# Patient Record
Sex: FEMALE | Race: WHITE | NOT HISPANIC OR LATINO | Employment: UNEMPLOYED | URBAN - METROPOLITAN AREA
[De-identification: names, ages, dates, MRNs, and addresses within clinical notes are randomized per-mention and may not be internally consistent; named-entity substitution may affect disease eponyms.]

---

## 2017-03-09 ENCOUNTER — OFFICE VISIT - RIVER FALLS (OUTPATIENT)
Dept: FAMILY MEDICINE | Facility: CLINIC | Age: 47
End: 2017-03-09

## 2017-03-09 ASSESSMENT — MIFFLIN-ST. JEOR: SCORE: 1412.53

## 2017-05-17 ENCOUNTER — OFFICE VISIT - RIVER FALLS (OUTPATIENT)
Dept: FAMILY MEDICINE | Facility: CLINIC | Age: 47
End: 2017-05-17

## 2017-08-29 ENCOUNTER — OFFICE VISIT - RIVER FALLS (OUTPATIENT)
Dept: FAMILY MEDICINE | Facility: CLINIC | Age: 47
End: 2017-08-29

## 2017-08-29 ASSESSMENT — MIFFLIN-ST. JEOR: SCORE: 1473.31

## 2018-06-29 ENCOUNTER — OFFICE VISIT - RIVER FALLS (OUTPATIENT)
Dept: FAMILY MEDICINE | Facility: CLINIC | Age: 48
End: 2018-06-29

## 2018-06-29 ASSESSMENT — MIFFLIN-ST. JEOR: SCORE: 1468.89

## 2018-10-18 ENCOUNTER — OFFICE VISIT - RIVER FALLS (OUTPATIENT)
Dept: FAMILY MEDICINE | Facility: CLINIC | Age: 48
End: 2018-10-18

## 2019-05-30 ENCOUNTER — OFFICE VISIT - RIVER FALLS (OUTPATIENT)
Dept: FAMILY MEDICINE | Facility: CLINIC | Age: 49
End: 2019-05-30

## 2019-09-13 ENCOUNTER — OFFICE VISIT - RIVER FALLS (OUTPATIENT)
Dept: FAMILY MEDICINE | Facility: CLINIC | Age: 49
End: 2019-09-13

## 2019-10-08 ENCOUNTER — OFFICE VISIT - RIVER FALLS (OUTPATIENT)
Dept: FAMILY MEDICINE | Facility: CLINIC | Age: 49
End: 2019-10-08

## 2020-12-04 ENCOUNTER — COMMUNICATION - RIVER FALLS (OUTPATIENT)
Dept: FAMILY MEDICINE | Facility: CLINIC | Age: 50
End: 2020-12-04

## 2020-12-04 ENCOUNTER — OFFICE VISIT - RIVER FALLS (OUTPATIENT)
Dept: FAMILY MEDICINE | Facility: CLINIC | Age: 50
End: 2020-12-04

## 2020-12-16 ENCOUNTER — OFFICE VISIT - RIVER FALLS (OUTPATIENT)
Dept: FAMILY MEDICINE | Facility: CLINIC | Age: 50
End: 2020-12-16

## 2021-01-07 ENCOUNTER — OFFICE VISIT - RIVER FALLS (OUTPATIENT)
Dept: FAMILY MEDICINE | Facility: CLINIC | Age: 51
End: 2021-01-07

## 2021-01-15 ENCOUNTER — COMMUNICATION - RIVER FALLS (OUTPATIENT)
Dept: FAMILY MEDICINE | Facility: CLINIC | Age: 51
End: 2021-01-15

## 2021-01-16 ENCOUNTER — COMMUNICATION - RIVER FALLS (OUTPATIENT)
Dept: FAMILY MEDICINE | Facility: CLINIC | Age: 51
End: 2021-01-16

## 2021-01-16 ENCOUNTER — OFFICE VISIT - RIVER FALLS (OUTPATIENT)
Dept: FAMILY MEDICINE | Facility: CLINIC | Age: 51
End: 2021-01-16

## 2021-01-16 LAB
BACTERIA #/AREA URNS HPF: NORMAL /[HPF]
EPITHELIAL CELLS UR: NORMAL
MUCOUS THREADS #/AREA URNS LPF: PRESENT /[LPF]
RBC #/AREA URNS AUTO: NORMAL /[HPF]
WBC #/AREA URNS AUTO: NORMAL /[HPF]

## 2021-01-28 ENCOUNTER — COMMUNICATION - RIVER FALLS (OUTPATIENT)
Dept: FAMILY MEDICINE | Facility: CLINIC | Age: 51
End: 2021-01-28

## 2021-01-28 ENCOUNTER — OFFICE VISIT - RIVER FALLS (OUTPATIENT)
Dept: FAMILY MEDICINE | Facility: CLINIC | Age: 51
End: 2021-01-28

## 2021-01-28 LAB
ALBUMIN UR-MCNC: ABNORMAL G/DL
BACTERIA #/AREA URNS HPF: NORMAL /[HPF]
BILIRUB UR QL STRIP: ABNORMAL
EPITHELIAL CELLS UR: NORMAL
GLUCOSE UR STRIP-MCNC: ABNORMAL MG/DL
HGB UR QL STRIP: NEGATIVE
KETONES UR STRIP-MCNC: ABNORMAL MG/DL
LEUKOCYTE ESTERASE UR QL STRIP: ABNORMAL
MUCOUS THREADS #/AREA URNS LPF: PRESENT /[LPF]
NITRATE UR QL: POSITIVE
PH UR STRIP: 5 [PH] (ref 5–8)
RBC #/AREA URNS AUTO: NORMAL /[HPF]
SP GR UR STRIP: ABNORMAL (ref 1–1.03)
WBC #/AREA URNS AUTO: NORMAL /[HPF]

## 2021-01-30 LAB — BACTERIA SPEC CULT: NORMAL

## 2021-02-01 ENCOUNTER — COMMUNICATION - RIVER FALLS (OUTPATIENT)
Dept: FAMILY MEDICINE | Facility: CLINIC | Age: 51
End: 2021-02-01

## 2021-02-09 ENCOUNTER — COMMUNICATION - RIVER FALLS (OUTPATIENT)
Dept: FAMILY MEDICINE | Facility: CLINIC | Age: 51
End: 2021-02-09

## 2021-02-10 ENCOUNTER — OFFICE VISIT - RIVER FALLS (OUTPATIENT)
Dept: FAMILY MEDICINE | Facility: CLINIC | Age: 51
End: 2021-02-10

## 2021-03-15 ENCOUNTER — AMBULATORY - HEALTHEAST (OUTPATIENT)
Dept: SURGERY | Facility: AMBULATORY SURGERY CENTER | Age: 51
End: 2021-03-15

## 2021-03-15 DIAGNOSIS — Z11.59 ENCOUNTER FOR SCREENING FOR OTHER VIRAL DISEASES: ICD-10-CM

## 2021-03-22 ENCOUNTER — OFFICE VISIT - RIVER FALLS (OUTPATIENT)
Dept: FAMILY MEDICINE | Facility: CLINIC | Age: 51
End: 2021-03-22

## 2021-03-26 ASSESSMENT — MIFFLIN-ST. JEOR: SCORE: 1479.77

## 2021-03-29 ENCOUNTER — ANESTHESIA - HEALTHEAST (OUTPATIENT)
Dept: SURGERY | Facility: AMBULATORY SURGERY CENTER | Age: 51
End: 2021-03-29

## 2021-03-30 ENCOUNTER — SURGERY - HEALTHEAST (OUTPATIENT)
Dept: SURGERY | Facility: AMBULATORY SURGERY CENTER | Age: 51
End: 2021-03-30

## 2021-03-30 ASSESSMENT — MIFFLIN-ST. JEOR: SCORE: 1479.77

## 2021-06-05 VITALS — HEIGHT: 65 IN | WEIGHT: 190 LBS | BODY MASS INDEX: 31.65 KG/M2

## 2021-06-16 NOTE — ANESTHESIA CARE TRANSFER NOTE
Last vitals:   Vitals:    03/30/21 1212   BP: 101/58   Pulse: 84   Resp: 16   Temp: 36.3  C (97.3  F)   SpO2: 97%     Patient's level of consciousness is awake  Spontaneous respirations: yes  Maintains airway independently: yes  Dentition unchanged: yes  Oropharynx: oropharynx clear of all foreign objects    QCDR Measures:  ASA# 20 - Surgical Safety Checklist: WHO surgical safety checklist completed prior to induction    PQRS# 430 - Adult PONV Prevention: 4558F - Pt received => 2 anti-emetic agents (different classes) preop & intraop  ASA# 8 - Peds PONV Prevention: NA - Not pediatric patient, not GA or 2 or more risk factors NOT present  PQRS# 424 - Jadyn-op Temp Management: 4559F - At least one body temp DOCUMENTED => 35.5C or 95.9F within required timeframe  PQRS# 426 - PACU Transfer Protocol: - Transfer of care checklist used  ASA# 14 - Acute Post-op Pain: ASA14B - Patient did NOT experience pain >= 7 out of 10

## 2021-06-16 NOTE — ANESTHESIA PREPROCEDURE EVALUATION
Anesthesia Evaluation      Patient summary reviewed   History of anesthetic complications (ponv)     Airway   Mallampati: II   Pulmonary - negative ROS and normal exam                          Cardiovascular - negative ROS and normal exam  Exercise tolerance: > or = 4 METS  Rhythm: regular  Rate: normal,         Neuro/Psych    (+) depression, anxiety/panic attacks,     Endo/Other    (+) obesity,      GI/Hepatic/Renal - negative ROS           Dental - normal exam                        Anesthesia Plan  Planned anesthetic: MAC  Planned anesthetic: MAC  FiO2 less than 30%  Propofol ggt  Decadron/zofran  Ketamine prn  LMA prn  ASA 2     Anesthetic plan and risks discussed with: patient    Post-op plan: routine recovery

## 2021-06-16 NOTE — ANESTHESIA POSTPROCEDURE EVALUATION
Patient: Pinky Post  Procedure(s):  CYSTOSCOPY WITH BLADDER HYDRODYSTENSION, , PUDENDAL NERVE BLOCK  Anesthesia type: MAC    Patient location: Phase II Recovery  Last vitals:   Vitals Value Taken Time   /64 03/30/21 1315   Temp 36.3  C (97.3  F) 03/30/21 1212   Pulse 79 03/30/21 1324   Resp 16 03/30/21 1315   SpO2 97 % 03/30/21 1324   Vitals shown include unvalidated device data.  Post vital signs: stable  Level of consciousness: awake and responds to simple questions  Post-anesthesia pain: pain controlled  Post-anesthesia nausea and vomiting: no  Pulmonary: unassisted, return to baseline  Cardiovascular: stable and blood pressure at baseline  Hydration: adequate  Anesthetic events: no    QCDR Measures:  ASA# 11 - Jadyn-op Cardiac Arrest: ASA11B - Patient did NOT experience unanticipated cardiac arrest  ASA# 12 - Jadyn-op Mortality Rate: ASA12B - Patient did NOT die  ASA# 13 - PACU Re-Intubation Rate: ASA13B - Patient did NOT require a new airway mgmt  ASA# 10 - Composite Anes Safety: ASA10A - No serious adverse event    Additional Notes:

## 2022-02-11 VITALS
WEIGHT: 199.6 LBS | HEART RATE: 83 BPM | OXYGEN SATURATION: 99 % | SYSTOLIC BLOOD PRESSURE: 116 MMHG | DIASTOLIC BLOOD PRESSURE: 78 MMHG | TEMPERATURE: 96.3 F

## 2022-02-12 VITALS
DIASTOLIC BLOOD PRESSURE: 62 MMHG | DIASTOLIC BLOOD PRESSURE: 74 MMHG | HEIGHT: 64 IN | HEART RATE: 88 BPM | HEART RATE: 76 BPM | BODY MASS INDEX: 30.76 KG/M2 | SYSTOLIC BLOOD PRESSURE: 108 MMHG | SYSTOLIC BLOOD PRESSURE: 116 MMHG | WEIGHT: 177.8 LBS | TEMPERATURE: 99 F | BODY MASS INDEX: 30.35 KG/M2 | HEIGHT: 64 IN

## 2022-02-12 VITALS
HEIGHT: 65 IN | DIASTOLIC BLOOD PRESSURE: 64 MMHG | SYSTOLIC BLOOD PRESSURE: 110 MMHG | HEART RATE: 64 BPM | WEIGHT: 187.6 LBS | TEMPERATURE: 98.3 F | BODY MASS INDEX: 31.25 KG/M2

## 2022-02-12 VITALS
HEART RATE: 80 BPM | TEMPERATURE: 97.4 F | TEMPERATURE: 98.3 F | SYSTOLIC BLOOD PRESSURE: 118 MMHG | SYSTOLIC BLOOD PRESSURE: 124 MMHG | DIASTOLIC BLOOD PRESSURE: 82 MMHG | HEART RATE: 95 BPM | OXYGEN SATURATION: 97 % | DIASTOLIC BLOOD PRESSURE: 70 MMHG

## 2022-02-12 VITALS
SYSTOLIC BLOOD PRESSURE: 106 MMHG | HEIGHT: 64 IN | HEART RATE: 108 BPM | DIASTOLIC BLOOD PRESSURE: 78 MMHG | BODY MASS INDEX: 32.64 KG/M2 | WEIGHT: 191.2 LBS | OXYGEN SATURATION: 98 %

## 2022-02-12 VITALS — DIASTOLIC BLOOD PRESSURE: 60 MMHG | SYSTOLIC BLOOD PRESSURE: 104 MMHG | HEART RATE: 76 BPM | TEMPERATURE: 98.2 F

## 2022-02-12 VITALS — SYSTOLIC BLOOD PRESSURE: 102 MMHG | HEART RATE: 88 BPM | DIASTOLIC BLOOD PRESSURE: 56 MMHG

## 2022-02-15 NOTE — PROGRESS NOTES
Patient:   LUCIANA PEACE            MRN: [id number]            FIN: [id number]               Age:   47 years     Sex:  Female     :  1970   Associated Diagnoses:   Fibromyalgia; CFS (chronic fatigue syndrome); Anxiety   Author:   Nancy Otero MD      Chief Complaint   3/9/2017 4:13 PM CST     Disability paperwork; med refills      Interval History   Patient here to follow up on fibromyalgia and anxiety.   Overall medicaitons are helping. Due for refills of clonazepam.   Had increased stress due to death of boyfriend. Feels like she is started to recover. Back to usual medication.  Patient continues to have daily pain. Has been unable to work since . Needs paperwork completed.      Health Status   Allergies:    Allergic Reactions (All)  Severity Not Documented  Gabapentin (No reactions were documented)  Lexapro (Recurrent itchy hives during initial therapy with lexapro)  Nortriptyline (Had migraine at 50 mg dose)   Medications:  (Selected)   Prescriptions  Prescribed  BuSpar 10 mg oral tablet: 1 tab(s) ( 10 mg ), PO, TID, # 270 tab(s), 3 Refill(s), Type: Maintenance, Pharmacy: [facility] #[id number], patient will notify pharmacy when prescription is needed, 1 tab(s) po tid  clonazePAM 1 mg oral tablet: 1 tab(s) ( 1 mg ), po, bid, # 60 tab(s), 5 Refill(s), Type: Maintenance, Pharmacy: [facility] #[id number], Pt needs appt for further refills, 1 tab(s) po bid  cyclobenzaprine 10 mg oral tablet: 1 tab(s) ( 10 mg ), po, tid, # 90 tab(s), 2 Refill(s), Type: Soft Stop, Pharmacy: [facility] #[id number], 1 tab(s) po tid  meloxicam 15 mg oral tablet: 1 tab(s) ( 15 mg ), po, daily, x 90 day(s), # 90 tab(s), 3 Refill(s), Type: Physician Stop, Pharmacy: [facility] #[id number], patient will notify pharmacy when prescription is needed, 1 tab(s) po daily,x90 day(s)  omeprazole 20 mg oral delayed release capsule: 1 cap(s) ( 20 mg ), po, daily, # 90 cap(s), 3 Refill(s), Type: Maintenance, Pharmacy: [facility] #[id number],  patient will notify pharmacy when prescription is needed, 1 cap(s) po daily  traMADol 50 mg oral tablet: 1 tab(s) ( 50 mg ), PO, bid, Instructions: up to bid; okay to fill today; fill every 30 days, PRN: for pain, # 60 tab(s), 5 Refill(s), Type: Maintenance, Pharmacy: [facility] #[id number], 1 tab(s) po bid,PRN:for pain,Instr:up to bid; okay to fill today...  venlafaxine 225 mg oral tablet, extended release: 1 tab(s) ( 225 mg ), po, daily, # 30 tab(s), 11 Refill(s), Type: Maintenance, Pharmacy: [facility] #[id number], 1 tab(s) po daily  Documented Medications  Documented  Calcium-Vitamin D: Calcium-Vitamin D, Supply, 0 Refill(s), Type: Maintenance   Problem list:    All Problems  Anxiety / SNOMED CT 58087157 / Confirmed  CFS (chronic fatigue syndrome) / SNOMED CT 25138204 / Confirmed  Depression, recurrent / SNOMED CT 684865453 / Confirmed  Eating disorder in remission / SNOMED CT [id number] / Confirmed  Fibromyalgia / SNOMED CT 48793442 / Confirmed  Migraine / SNOMED CT 48611832 / Confirmed  Obesity / SNOMED CT 0671142945 / Probable  PALPITATIONS / ICD-9-.1 / Confirmed  Stress incontinence / SNOMED CT 139840659 / Confirmed      Histories   Family History:    Kidney disease  Daughter     Procedure history:    Laparoscopic cholecystectomy (37484178) on 5/26/2016 at 46 Years.  LEEP.  Tonsillectomy.  Breast reduction (665271198).  Abdominoplasty (986446386).  Vaginal delivery ([id number]).  Exploratory lap and right dermoid excision 2007.      Physical Examination   Vital Signs   3/9/2017 4:13 PM CST Peripheral Pulse Rate 76 bpm    Pulse Site Radial artery    HR Method Manual    Systolic Blood Pressure 108 mmHg    Diastolic Blood Pressure 62 mmHg    Mean Arterial Pressure 77 mmHg    BP Site Left arm    BP Method Manual      Measurements from flowsheet : Measurements   3/9/2017 4:13 PM CST Height Measured - Standard 63.75 in    Weight Measured - Standard 177.8 lb     BSA 1.9 m2    Body Mass Index 30.76 kg/m2      General:  Alert and oriented, No acute distress.    Psychiatric:  Cooperative, Appropriate mood & affect, Normal judgment.       Impression and Plan   Diagnosis     Fibromyalgia (MFW52-ZG M79.7).     CFS (chronic fatigue syndrome) (YKD90-GG R53.82).     Anxiety (TAW89-ZI F41.1).     Plan:  paperwork completed. Follow up again in 6 months..    Orders     Orders (Selected)   Prescriptions  Prescribed  clonazePAM 1 mg oral tablet: 1 tab(s) ( 1 mg ), po, bid, # 60 tab(s), 5 Refill(s), Type: Maintenance, Pharmacy: [facility] #[id number], Pt needs appt for further refills, 1 tab(s) po bid  traMADol 50 mg oral tablet: 1 tab(s) ( 50 mg ), PO, bid, Instructions: up to bid; okay to fill today; fill every 30 days, PRN: for pain, # 60 tab(s), 5 Refill(s), Type: Maintenance, Pharmacy: [facility] #[id number], 1 tab(s) po bid,PRN:for pain,Instr:up to bid; okay to fill today....

## 2022-02-15 NOTE — PROGRESS NOTES
Patient:   LUCIANA PEACE            MRN: [id number]            FIN: [id number]               Age:   49 years     Sex:  Female     :  1970   Associated Diagnoses:   Right acute suppurative otitis media; Persistent cough   Author:   Nawaf Key PA-C      Report Summary   Diagnosis  Right acute suppurative otitis media (FSZ80-BL H66.001).  Patient InstructionsOrders   Visit Information      Date of Service: 2019 09:33 am  Performing Location: [facility]  Encounter#: [id number]      Primary Care Provider (PCP):  Nancy Otero MD    NPI# [id number]      Referring Provider:  Nawaf Key PA-C    NPI# [id number]   Visit type:  New symptom.    Source of history:  Self, Medical record.    History limitation:  None.       Chief Complaint   2019 9:34 AM CDT    c/o cough, ear pain, weak, chest hurts x 3 days        History of Present Illness             The patient presents with cough.  The cough is described as hacking.  The severity of the cough is moderate.  The cough is episodic, fluctuates in intensity and is worsening.  The cough has lasted for 3 day(s).  Associated symptoms consist of nasal congestion, sore throat, otalgia and denies fever.        Review of Systems   Eye:  Negative.    Ear/Nose/Mouth/Throat:  Negative except as documented in history of present illness.    Respiratory:  Negative except as documented in history of present illness.       Health Status   Allergies:    Allergic Reactions (All)  Severity Not Documented  Gabapentin (No reactions were documented)  Lexapro (Recurrent itchy hives during initial therapy with lexapro)  Nortriptyline (Had migraine at 50 mg dose)   Medications:  (Selected)   Prescriptions  Prescribed  Tessalon Perles 100 mg oral capsule: = 2 cap(s) ( 200 mg ), PO, TID, # 42 cap(s), 0 Refill(s), Type: Maintenance, Pharmacy: [facility] #[id number], 2 cap(s) Oral tid,x7 day(s)  cefdinir 300 mg oral capsule: = 1 cap(s) ( 300 mg ), PO, q12hr, x  10 day(s), # 20 cap(s), 0 Refill(s), Type: Acute, Pharmacy: [facility] #[id number], 1 cap(s) Oral q12 hrs,x10 day(s)  clonazePAM 1 mg oral tablet: 1 tab(s) ( 1 mg ), po, bid, # 60 tab(s), 5 Refill(s), Type: Maintenance, Pharmacy: [facility] #[id number], 1 tab(s) Oral bid  tiZANidine 2 mg oral tablet: 1 tab(s) ( 2 mg ), Oral, q8 hrs, # 90 tab(s), 0 Refill(s), Type: Maintenance, Pharmacy: [facility] #[id number], 1 tab(s) Oral q8 hrs  venlafaxine 150 mg oral tablet, extended release: 2 tab(s) ( 300 mg ), po, daily, Instructions: either capsules or tablets - whichever is most cost effective for patient, # 180 tab(s), 3 Refill(s), Type: Maintenance, Pharmacy: [facility] #[id number], 2 tab(s) Oral daily,Instr:either capsules or tablet...  Documented Medications  Documented  Zantac: 0 Refill(s), Type: Maintenance,    Medications          *denotes recorded medication          Tessalon Perles 100 mg oral capsule: 200 mg, 2 cap(s), PO, TID, for 7 day(s), 42 cap(s), 0 Refill(s).          cefdinir 300 mg oral capsule: 300 mg, 1 cap(s), PO, q12hr, for 10 day(s), 20 cap(s), 0 Refill(s).          clonazePAM 1 mg oral tablet: 1 mg, 1 tab(s), po, bid, 60 tab(s), 5 Refill(s).          *Zantac: 0 Refill(s).          tiZANidine 2 mg oral tablet: 2 mg, 1 tab(s), Oral, q8 hrs, 90 tab(s), 0 Refill(s).          venlafaxine 150 mg oral tablet, extended release: 300 mg, 2 tab(s), po, daily, either capsules or tablets - whichever is most cost effective for patient, 180 tab(s), 3 Refill(s).       Problem list:    All Problems  Stress incontinence / SNOMED CT 405099714 / Confirmed  PALPITATIONS / ICD-9-.1 / Confirmed  Obesity / SNOMED CT 5032793275 / Probable  Migraine / SNOMED CT 46175827 / Confirmed  Fibromyalgia / SNOMED CT 87997551 / Confirmed  Eating disorder in remission / SNOMED CT [id number] / Confirmed  Depression, recurrent / SNOMED CT 998704547 / Confirmed  CFS (chronic fatigue syndrome) / SNOMED CT  33013726 / Confirmed  Anxiety / SNOMED CT 04434611 / Confirmed  Resolved: Abnormal Pap Smear of Cervix      Histories   Past Medical History:    Active  Fibromyalgia (75258560)  CFS (chronic fatigue syndrome) (94598787)  PALPITATIONS (785.1)  Comments:  4/14/2014 CDT 11:37 AM CDT - Pratibha Pastrana MD  Normal EKG and Holter  Anxiety (53573744)  Depression, recurrent (277078897)  Stress incontinence (645198310)  Migraine (36544850)  Eating disorder in remission ([id number])  Resolved  Abnormal Pap Smear of Cervix:  Resolved.   Family History:    Kidney disease  Daughter     Procedure history:    Laparoscopic cholecystectomy (16146421) on 5/26/2016 at 46 Years.  LEEP.  Tonsillectomy.  Breast reduction (865982734).  Abdominoplasty (200816112).  Vaginal delivery ([id number]).  Exploratory lap and right dermoid excision 2007.   Social History:        Alcohol Assessment            1-2 times per month, 1 drinks/episode average.      Tobacco Assessment            Former smoker, Cigarettes, 20 per day.                     Comments:                      05/17/2017 - Divine Mitchell LPN                     Quit for 6 yrs. Restarted.      Substance Abuse Assessment            Never      Home and Environment Assessment            Risks in environment: Does not wear helmet.      Nutrition and Health Assessment            Type of diet: Regular.      Exercise and Physical Activity Assessment            Exercise frequency: Never.      Sexual Assessment            Sexually active: Yes.  Sexual orientation: Heterosexual.        Physical Examination   Vital Signs   9/13/2019 9:34 AM CDT Temperature Tympanic 97.4 DegF  LOW    Peripheral Pulse Rate 95 bpm    HR Method Electronic    Systolic Blood Pressure 118 mmHg    Diastolic Blood Pressure 82 mmHg  HI    Mean Arterial Pressure 94 mmHg    BP Site Right arm    BP Method Manual    Oxygen Saturation 97 %      Measurements from flowsheet  : Measurements   9/13/2019 9:34 AM CDT    Ht/Wt Measurement Refused by Patient?     Yes     General:  Alert and oriented, No acute distress.    Eye:  Pupils are equal, round and reactive to light, Extraocular movements are intact, Normal conjunctiva.    HENT:  Normocephalic, Oral mucosa is moist, No pharyngeal erythema.         Ear: Right ear, Tympanic membrane ( Intact, Erythematous, Fluid in middle ear ).         Nose: Both nostrils, Discharge ( Small amount, Green ).         Sinus: No tenderness.    Neck:  Supple, Non-tender, No lymphadenopathy.    Respiratory:  Lungs are clear to auscultation, Respirations are non-labored, Breath sounds are equal.    Cardiovascular:  Normal rate, Regular rhythm, No murmur.    Psychiatric:  Cooperative, Appropriate mood & affect.       Impression and Plan   Diagnosis     Right acute suppurative otitis media (UGS44-DZ H66.001).     Persistent cough (TTW80-CQ R05).     Patient Instructions:       Counseled: Patient, Regarding diagnosis, Regarding treatment, Regarding medications, Regarding activity, Regarding smoking cessation, Verbalized understanding.    Orders     Orders (Selected)   Prescriptions  Prescribed  Tessalon Perles 100 mg oral capsule: = 2 cap(s) ( 200 mg ), PO, TID, # 42 cap(s), 0 Refill(s), Type: Maintenance, Pharmacy: [facility] #[id number], 2 cap(s) Oral tid,x7 day(s)  cefdinir 300 mg oral capsule: = 1 cap(s) ( 300 mg ), PO, q12hr, x 10 day(s), # 20 cap(s), 0 Refill(s), Type: Acute, Pharmacy: [facility] #[id number], 1 cap(s) Oral q12 hrs,x10 day(s).     Take medicine as prescribed, side effects discussed.  Tylenol/ibuprofen for fever and discomfort.  Push fluids.  RTC if not improving in 36-48 hours, prior if concerns as we have discussed.

## 2022-02-15 NOTE — NURSING NOTE
Comprehensive Intake Entered On:  2021 11:23 AM CST    Performed On:  2021 11:13 AM CST by Esthela Post               Summary   Chief Complaint :   UTI symptoms not resolving from 21. Just finished Sulfameth/Trimethoprim yesterday. Urgency, burning, coming and going.   Last Menstrual Period :   1/3/2021 CST   Menstrual Status :   Menarcheal   Weight Measured :   199.6 lb(Converted to: 199 lb 10 oz, 90.537 kg)    Systolic Blood Pressure :   116 mmHg   Diastolic Blood Pressure :   78 mmHg   Mean Arterial Pressure :   91 mmHg   Peripheral Pulse Rate :   83 bpm   BP Site :   Right arm   BP Method :   Manual   HR Method :   Electronic   Temperature Tympanic :   96.3 DegF(Converted to: 35.7 DegC)  (LOW)    Oxygen Saturation :   99 %   Sincere Esthela - 2021 11:13 AM CST   Health Status   Allergies Verified? :   Yes   Medication History Verified? :   Yes   Immunizations Current :   Yes   Medical History Verified? :   Yes   Pre-Visit Planning Status :   Not completed   Tobacco Use? :   Current every day smoker   Tobacco Cessation Review :   Ready to quit   Esthela Post - 2021 11:13 AM CST   Demographics   Last Name :   SINCERE   Address :   [address] [place] [address]   First Name :   LUCIANA Roberts Initial :   L   Responsible Party Date of Birth () :   1970 CST   City :   [place]   State :   [place]   Zip Code :   [address]   Contact Relationship to Patient (other) :   Patient   Esthela Post - 2021 11:13 AM CST   Consents   Consent for Immunization Exchange :   Consent Granted   Consent for Immunizations to Providers :   Consent Granted   Esthela Post - 2021 11:13 AM CST   Meds / Allergies   (As Of: 2021 11:23:47 AM CST)   Allergies (Active)   gabapentin  Estimated Onset Date:   Unspecified ; Created By:   Eden Paniagua CMA; Reaction Status:   Active ; Category:   Drug ; Substance:   gabapentin ; Type:   Allergy ; Updated By:   Eden Paniagua CMA; Reviewed Date:   2021 11:19 AM  CST      Lexapro  Estimated Onset Date:   Unspecified ; Reactions:   recurrent itchy hives during initial therapy with Lexapro ; Created By:   Marisela Lizama MA; Reaction Status:   Active ; Category:   Drug ; Substance:   Lexapro ; Type:   Allergy ; Updated By:   Marisela Lizama MA; Reviewed Date:   1/16/2021 11:19 AM CST      nortriptyline  Estimated Onset Date:   Unspecified ; Reactions:   had migraine at 50 mg dose ; Created By:   Marisela Lizama MA; Reaction Status:   Active ; Category:   Drug ; Substance:   nortriptyline ; Type:   Allergy ; Updated By:   Marisela Lizama MA; Reviewed Date:   1/16/2021 11:19 AM CST        Medication List   (As Of: 1/16/2021 11:23:47 AM CST)   Prescription/Discharge Order    clonazePAM  :   clonazePAM ; Status:   Prescribed ; Ordered As Mnemonic:   clonazePAM 1 mg oral tablet ; Simple Display Line:   1 mg, 1 tab(s), po, bid, 60 tab(s), 5 Refill(s) ; Ordering Provider:   Nancy Otero MD; Catalog Code:   clonazePAM ; Order Dt/Tm:   6/29/2018 11:21:11 AM CDT          venlafaxine  :   venlafaxine ; Status:   Prescribed ; Ordered As Mnemonic:   venlafaxine 150 mg oral tablet, extended release ; Simple Display Line:   300 mg, 2 tab(s), po, daily, either capsules or tablets - whichever is most cost effective for patient, 180 tab(s), 3 Refill(s) ; Ordering Provider:   Nancy Otero MD; Catalog Code:   venlafaxine ; Order Dt/Tm:   6/29/2018 11:21:24 AM CDT            Home Meds    hydrOXYzine  :   hydrOXYzine ; Status:   Documented ; Ordered As Mnemonic:   hydrOXYzine ; Simple Display Line:   0 Refill(s) ; Catalog Code:   hydrOXYzine ; Order Dt/Tm:   10/8/2019 12:02:28 PM CDT            ID Risk Screen   Recent Travel History :   No recent travel   Family Member Travel History :   No recent travel   Other Exposure to Infectious Disease :   Unknown   Esthela Post - 1/16/2021 11:13 AM CST   Social History   Social History   (As Of: 1/16/2021 11:23:47 AM CST)   Alcohol:         1-2 times per month, 1 drinks/episode average.   (Last Updated: 5/17/2017 3:52:48 PM CDT by Divine Mitchell LPN)          Tobacco:        10 or more cigarettes (1/2 pack or more)/day in last 30 days   (Last Updated: 1/16/2021 11:20:47 AM CST by Esthela Post)          Electronic Cigarette/Vaping:        Electronic Cigarette Use: Never.   (Last Updated: 1/16/2021 11:21:00 AM CST by Esthela Post)          Substance Abuse:        Never   (Last Updated: 4/23/2014 11:28:42 AM CDT by Chelo Saenz MA)          Home/Environment:        Risks in environment: Does not wear helmet.   (Last Updated: 3/26/2015 12:59:08 PM CDT by Tracey Mendez MA)          Nutrition/Health:        Type of diet: Regular.   (Last Updated: 3/26/2015 12:59:14 PM CDT by Tracey Mendez MA)          Exercise:        Exercise frequency: Never.   (Last Updated: 3/26/2015 12:59:25 PM CDT by Tracey Mendez MA)          Sexual:        Sexually active: Yes.  Sexual orientation: Heterosexual.   (Last Updated: 4/23/2014 11:29:24 AM CDT by Chelo Saenz MA)

## 2022-02-15 NOTE — PROGRESS NOTES
Patient:   LUCIANA PEACE            MRN: [id number]            FIN: [id number]               Age:   50 years     Sex:  Female     :  1970   Associated Diagnoses:   Recurrent cystitis   Author:   Nancy Otero MD      Visit Information      Date of Service: 02/10/2021 08:17 am  Performing Location: [facility]  Encounter#: [id number]      Primary Care Provider (PCP):  Nancy Otero MD    NPI# [id number]      Referring Provider:  Nancy Otero MD    NPEDI# [id number]   Participants in room during visit:  patient, self   Location of patient:  home  Location of provider:  office  Video Start Time:  858  Video End Time:   913  Video visit via Pikimal    Today's visit was conducted via video conference due to the COVID-19 pandemic.  The patient's consent to proceed with a video visit has been obtained and documented.      Chief Complaint   2/10/2021 8:14 AM CST    f/u cystitis ER visit, needs referral to urology. Verbal consent given for video visit.      History of Present Illness   Patient is being evaluated via a billable video visit.  patient with minimal improvement on cipro, finished medication  azo is helpful  still having good and bad days  culture was negative  no urinary changes noted  ready to move on to next steps as previously discussed.      Health Status   Allergies:    Allergic Reactions (Selected)  Severity Not Documented  Gabapentin (No reactions were documented)  Lexapro (Recurrent itchy hives during initial therapy with lexapro)  Nortriptyline (Had migraine at 50 mg dose)   Medications:  (Selected)   Prescriptions  Prescribed  Pyridium 200 mg oral tablet: = 1 tab(s) ( 200 mg ), Oral, tid, # 21 tab(s), 2 Refill(s), Type: Maintenance, Pharmacy: [facility] #[id number], 1 tab(s) Oral tid, 199.6, lb, 21 11:13:00 CST, Weight Measured  amitriptyline 10 mg oral tablet: = 1 tab(s) ( 10 mg ), Oral, hs, Instructions: increase to 2 tabs on 21, # 60 tab(s), 1  Refill(s), Type: Maintenance, Pharmacy: [facility] #[id number], 1 tab(s) Oral hs,Instr:increase to 2 tabs on 2/13/21, 199.6, lb, 01/16/21 11:13:00 CST, We...  clonazePAM 1 mg oral tablet: 1 tab(s) ( 1 mg ), po, bid, # 60 tab(s), 5 Refill(s), Type: Maintenance, Pharmacy: [facility] #[id number], 1 tab(s) Oral bid  naproxen 500 mg oral delayed release tablet: = 1 tab(s) ( 500 mg ), Oral, bid, Instructions: with food, # 60 tab(s), 0 Refill(s), Type: Maintenance, Pharmacy: [facility] #[id number], 1 tab(s) Oral bid,Instr:with food, 199.6, lb, 01/16/21 11:13:00 CST, Weight Measured  venlafaxine 150 mg oral tablet, extended release: 2 tab(s) ( 300 mg ), po, daily, Instructions: either capsules or tablets - whichever is most cost effective for patient, # 180 tab(s), 3 Refill(s), Type: Maintenance, Pharmacy: [facility] #[id number], 2 tab(s) Oral daily,Instr:either capsules or tablet...  Documented Medications  Documented  hydrOXYzine: 0 Refill(s), Type: Maintenance   Problem list:    All Problems  Tobacco user / SNOMED CT 618126755 / Probable  Stress incontinence / SNOMED CT 462056521 / Confirmed  PALPITATIONS / ICD-9-.1 / Confirmed  Normal EKG and Holter  Obesity / SNOMED CT 7479602314 / Probable  Migraine / SNOMED CT 59121959 / Confirmed  Fibromyalgia / SNOMED CT 35822743 / Confirmed  Eating disorder in remission / SNOMED CT 5Y2951AD-Q008-0N9M-10E2-631017G15F44 / Confirmed  Depression, recurrent / SNOMED CT 827805609 / Confirmed  CFS (chronic fatigue syndrome) / SNOMED CT 77856575 / Confirmed  Anxiety / SNOMED CT 26551481 / Confirmed      Histories   Past Medical History:    Active  Fibromyalgia (SNOMED CT 54398333)  CFS (chronic fatigue syndrome) (SNOMED CT 49166493)  PALPITATIONS (ICD-9-.1)  Comments:  4/14/2014 CDT 11:37 AM CDT - Pratibha Pastrana MD  Normal EKG and Holter  Anxiety (SNOMED CT 34913227)  Depression, recurrent (SNOMED CT 149285137)  Stress incontinence (SNOMED CT 254822661)  Migraine  (SNOMED CT 16300297)  Eating disorder in remission (SNOMED CT 4F2838SE-A859-0C3H-35A0-150995I61V71)  Resolved  Abnormal Pap Smear of Cervix:  Resolved.   Family History:    Kidney disease  Daughter     Procedure history:    Laparoscopic cholecystectomy (15685416) on 5/26/2016 at 46 Years.  LEEP.  Tonsillectomy.  Breast reduction (774356404).  Abdominoplasty (199655984).  Vaginal delivery ([id number]).  Exploratory lap and right dermoid excision 2007.   Social History:        Electronic Cigarette/Vaping Assessment            Electronic Cigarette Use: Never.      Alcohol Assessment            1-2 times per month, 1 drinks/episode average.      Tobacco Assessment            10 or more cigarettes (1/2 pack or more)/day in last 30 days      Substance Abuse Assessment            Never      Home and Environment Assessment            Risks in environment: Does not wear helmet.      Nutrition and Health Assessment            Type of diet: Regular.      Exercise and Physical Activity Assessment            Exercise frequency: Never.      Sexual Assessment            Sexually active: Yes.  Sexual orientation: Heterosexual.        Physical Examination   General:  Alert and oriented, No acute distress.    Eye:  Pupils are equal, round and reactive to light, Normal conjunctiva.    HENT:  Oral mucosa is moist.    Respiratory:  Respirations are non-labored.    Psychiatric:  Cooperative, Appropriate mood & affect, Normal judgment.       Impression and Plan   Diagnosis     Recurrent cystitis (FRN62-KV N30.90).     Plan:  chronic cystitis, unclear etiology, will need urology follow up. OK for ongoing AZO. Will add NSAID and bedtime amitriptyline. Noted that she had headaches on higher doses of nortriptyline. Doubt urge incontinence medication would help. Will get her in to urogynecology..    Orders     Orders (Selected)   Outpatient Orders  Ordered  Referral (Request): 02/10/21 9:02:00 CST, Referred to:  Urogynecology, Referred to: [facility] if possible, Priority: Routine, Recurrent cystitis  Prescriptions  Prescribed  amitriptyline 10 mg oral tablet: = 1 tab(s) ( 10 mg ), Oral, hs, Instructions: increase to 2 tabs on 2/13/21, # 60 tab(s), 1 Refill(s), Type: Maintenance, Pharmacy: [facility] #[id number], 1 tab(s) Oral hs,Instr:increase to 2 tabs on 2/13/21, 199.6, lb, 01/16/21 11:13:00 CST, We...  naproxen 500 mg oral delayed release tablet: = 1 tab(s) ( 500 mg ), Oral, bid, Instructions: with food, # 60 tab(s), 0 Refill(s), Type: Maintenance, Pharmacy: [facility] #[id number], 1 tab(s) Oral bid,Instr:with food, 199.6, lb, 01/16/21 11:13:00 CST, Weight Measured.        Review / Management   Results review:  Lab results   1/28/2021 9:32 AM CST Urine Culture See comment   1/28/2021 9:30 AM CST UA Epithelial Cells Few    UA Mucous Present    UA WBC 3-5    UA RBC 0-2    UA Bacteria Moderate   1/28/2021 9:17 AM CST UA pH 5.0    UA Specific Gravity < OR = 1.005    UA Glucose 1+    UA Bilirubin 1+    UA Ketones TRACE    U Occult Blood NEGATIVE    UA Protein 2+    UA Nitrite POSITIVE    UA Leuk Est 3+   1/16/2021 11:35 AM CST Urine Culture See comment   1/16/2021 11:33 AM CST UA pH 6.0    UA Specific Gravity > OR = 1.030    UA Glucose NEGATIVE    UA Bilirubin NEGATIVE    UA Ketones NEGATIVE    U Occult Blood TRACE    UA Protein NEGATIVE    UA Nitrite NEGATIVE    UA Leuk Est NEGATIVE   1/16/2021 11:28 AM CST UA Epithelial Cells Few    UA Mucous Present    UA WBC 3-5    UA RBC 0-2    UA Bacteria Moderate   .

## 2022-02-15 NOTE — TELEPHONE ENCOUNTER
---------------------  From: Jane Ramsey MA (Phone Messages Pool ([id number]_[place]))   To: [facility] Message Pool ([id number]_[facility]);     Sent: 1/28/2021 8:13:21 AM CST  Subject: UA specimen     Phone Message    PCP:   NANDO      Time of Call:  0807       Person Calling:  pt  Phone number:  [phone]    Reason for call:  pt had video visit w/ [facility] this AM, is to drop off urine sample.  Pt stated that she took some Azo and is worried that it might alter UA results.  Wondering if she can come in tomorrow to leave UA.  Please advise  Returned call at: _    Note:   _    Last office visit and reason:  1/28/2021 w/ [facility] for UTI sx    Transferred to: _---------------------  From: Therese Langley CMA ([facility] Message Pool ([id number]_[facility]))   To: Nancy Otero MD;     Sent: 1/28/2021 8:19:20 AM CST  Subject: FW: UA specimenI would recommend she comes today. The UA dipstick will be affected, but the urine micro and the urine culture would not be affected.---------------------  From: Nancy Otero MD   To: [employer] Message Pool ([id number]_[facility]);     Sent: 1/28/2021 8:53:03 AM CST  Subject: RE: UA specimenPt notified by  of [facility] advice

## 2022-02-15 NOTE — LETTER
(Inserted Image. Unable to display)   September 29, 2021  LUCIANA PEACE  [facility] [place] [address]          Dear LUCIANA,      Thank you for selecting [facility] for your healthcare needs.    Our records indicate you are due for the following services:     Annual Physical    (FYI   Regarding office visits: In some instances, a video visit or telephone visit may be offered as an option.)        To schedule an appointment or if you have further questions, please contact your clinic at [phone].      Powered by [employer]    Sincerely,    Nancy Otero M.D.

## 2022-02-15 NOTE — TELEPHONE ENCOUNTER
---------------------  From: Nancy Otero MD   To: Referral Coordinators Pool ([id number]_[facility]);     Sent: 2/10/2021 9:03:39 AM CST  Subject: recurrent cystitis     ** Submitted: **  Order:Referral (Request)  Details:  2/10/2021 9:02 AM CST, Referred to: Urogynecology, Referred to: [facility] if possible, Priority: Routine, Recurrent cystitis         Signed by Nancy Otero MD  2/10/2021 3:02:00 PM [facility]

## 2022-02-15 NOTE — PROGRESS NOTES
Patient:   LUCIANA PEACE            MRN: [id number]            FIN: [id number]               Age:   49 years     Sex:  Female     :  1970   Associated Diagnoses:   Right acute suppurative otitis media; ETD (eustachian tube dysfunction)   Author:   Nawaf Key PA-C      Report Summary   Diagnosis  Right acute suppurative otitis media (XUD54-PX H66.001).  Patient InstructionsOrders   Visit Information      Date of Service: 2019 09:57 am  Performing Location: [facility]  Encounter#: [id number]   Visit type:  General concerns.    Accompanied by:  No one.    Source of history:  Self, Medical record.    Referral source:  Self.    History limitation:  None.       Chief Complaint   2019 10:01 AM CDT   Right earache and sore throat x few weeks        History of Present Illness             The patient presents with earache.  The location of the earache is the right ear.  The earache is characterized by pain and sensation of fullness.  The severity of the earache is moderate.  The earache is episodic, fluctuates in intensity and is worsening.  The earache has lasted for 1 week(s).  Mild nasal congestion. Sore throat. No fever. CC above noted and confirmed with the patient..        Review of Systems   Constitutional:  Negative.    Eye:  Negative.    Ear/Nose/Mouth/Throat:  Negative except as documented in history of present illness.    Respiratory:  Negative.       Health Status   Allergies:    Allergic Reactions (All)  Severity Not Documented  Gabapentin (No reactions were documented)  Lexapro (Recurrent itchy hives during initial therapy with lexapro)  Nortriptyline (Had migraine at 50 mg dose)   Medications:  (Selected)   Prescriptions  Prescribed  amoxicillin 875 mg oral tablet: = 1 tab(s) ( 875 mg ), PO, BID, # 20 tab(s), 0 Refill(s), Type: Maintenance, Pharmacy: [id number], 1 tab(s) Oral bid,x10 day(s)  clonazePAM 1 mg oral tablet: 1 tab(s) ( 1 mg ), po, bid, # 60 tab(s),  5 Refill(s), Type: Maintenance, Pharmacy: [facility] #[id number], 1 tab(s) Oral bid  omeprazole 20 mg oral delayed release capsule: 1 cap(s) ( 20 mg ), po, daily, # 90 cap(s), 3 Refill(s), Type: Maintenance, Pharmacy: [facility] #[id number], patient will notify pharmacy when prescription is needed, 1 cap(s) po daily  predniSONE 20 mg oral tablet: = 1 tab(s) ( 20 mg ), PO, Daily, x 7 day(s), # 7 tab(s), 0 Refill(s), Type: Acute, Pharmacy: [id number], 1 tab(s) Oral daily,x7 day(s)  tiZANidine 2 mg oral tablet: 1 tab(s) ( 2 mg ), Oral, q8 hrs, # 90 tab(s), 0 Refill(s), Type: Maintenance, Pharmacy: [facility] #[id number], 1 tab(s) Oral q8 hrs  venlafaxine 150 mg oral tablet, extended release: 2 tab(s) ( 300 mg ), po, daily, Instructions: either capsules or tablets - whichever is most cost effective for patient, # 180 tab(s), 3 Refill(s), Type: Maintenance, Pharmacy: [facility] #[id number], 2 tab(s) Oral daily,Instr:either capsules or tablet...  Suspended  meloxicam 15 mg oral tablet: 1 tab(s) ( 15 mg ), po, daily, x 90 day(s), # 90 tab(s), 3 Refill(s), Type: Physician Stop, Pharmacy: [facility] #[id number], patient will notify pharmacy when prescription is needed, 1 tab(s) Oral daily,x90 day(s)  Documented Medications  Documented  Calcium-Vitamin D: Calcium-Vitamin D, Supply, 0 Refill(s), Type: Maintenance   Problem list:    All Problems (Selected)  Stress incontinence / SNOMED CT 890231171 / Confirmed  PALPITATIONS / ICD-9-.1 / Confirmed  Obesity / SNOMED CT 1745357625 / Probable  Migraine / SNOMED CT 36058944 / Confirmed  Fibromyalgia / SNOMED CT 15966252 / Confirmed  Eating disorder in remission / SNOMED CT 3U6960ZK-R984-3O1F-59K3-319634Y11Z53 / Confirmed  Depression, recurrent / SNOMED CT 702705153 / Confirmed  CFS (chronic fatigue syndrome) / SNOMED CT 43121888 / Confirmed  Anxiety / SNOMED CT 91738297 / Confirmed      Histories   Past Medical History:    Active  Fibromyalgia (53928369)  CFS  (chronic fatigue syndrome) (45867493)  PALPITATIONS (785.1)  Comments:  4/14/2014 CDT 11:37 AM CDT - Pratibha Pastrana MD  Normal EKG and Holter  Anxiety (46295753)  Depression, recurrent (171804973)  Stress incontinence (767397632)  Migraine (37814770)  Eating disorder in remission (9B0104AA-[id number])  Resolved  Abnormal Pap Smear of Cervix:  Resolved.   Family History:    Kidney disease  Daughter     Procedure history:    Laparoscopic cholecystectomy (46576954) on 5/26/2016 at 46 Years.  LEEP.  Tonsillectomy.  Breast reduction (395485517).  Abdominoplasty (864222535).  Vaginal delivery ([id number]).  Exploratory lap and right dermoid excision 2007.   Social History:        Alcohol Assessment            1-2 times per month, 1 drinks/episode average.      Tobacco Assessment            Former smoker, Cigarettes, 20 per day.                     Comments:                      05/17/2017 - Divine Mitchell LPN                     Quit for 6 yrs. Restarted.      Substance Abuse Assessment            Never      Home and Environment Assessment            Risks in environment: Does not wear helmet.      Nutrition and Health Assessment            Type of diet: Regular.      Exercise and Physical Activity Assessment            Exercise frequency: Never.      Sexual Assessment            Sexually active: Yes.  Sexual orientation: Heterosexual.      Physical Examination   Vital Signs   5/30/2019 10:01 AM CDT Temperature Tympanic 98.2 DegF    Peripheral Pulse Rate 76 bpm    Pulse Site Radial artery    HR Method Manual    Systolic Blood Pressure 104 mmHg    Diastolic Blood Pressure 60 mmHg    Mean Arterial Pressure 75 mmHg    BP Site Left arm    BP Method Manual      Measurements from flowsheet : Measurements   5/30/2019 10:01 AM CDT   Ht/Wt Measurement Refused by Patient?     Yes     General:  Alert and oriented, No acute distress.    Eye:  Pupils are equal, round and reactive to  light, Extraocular movements are intact, Normal conjunctiva.    HENT:  Normocephalic, Oral mucosa is moist, No pharyngeal erythema.         Ear: Right ear, Tympanic membrane ( Intact, Fluid in middle ear ).         Nose: Both nostrils, Discharge ( Small amount, Clear ).    Neck:  Supple, Non-tender, No lymphadenopathy.    Respiratory:  Lungs are clear to auscultation, Respirations are non-labored.    Cardiovascular:  Normal rate, Regular rhythm.       Impression and Plan   Diagnosis     Right acute suppurative otitis media (CWK84-MW H66.001).     ETD (eustachian tube dysfunction) (LHP66-EN H69.80).     Patient Instructions:       Counseled: Patient, Regarding diagnosis, Regarding treatment, Regarding medications, Activity, Verbalized understanding.    Orders     Orders (Selected)   Prescriptions  Prescribed  amoxicillin 875 mg oral tablet: = 1 tab(s) ( 875 mg ), PO, BID, # 20 tab(s), 0 Refill(s), Type: Maintenance, Pharmacy: [id number], 1 tab(s) Oral bid,x10 day(s)  predniSONE 20 mg oral tablet: = 1 tab(s) ( 20 mg ), PO, Daily, x 7 day(s), # 7 tab(s), 0 Refill(s), Type: Acute, Pharmacy: [id number], 1 tab(s) Oral daily,x7 day(s).     Take medicine as prescribed, side effects discussed.  Tylenol/ibuprofen for fever and discomfort.  Push fluids.  RTC if not improving in 36-48 hours, prior if concerns as we have discussed.

## 2022-02-15 NOTE — PROGRESS NOTES
Patient:   LUCIANA PEACE            MRN: [id number]            FIN: [id number]               Age:   48 years     Sex:  Female     :  1970   Associated Diagnoses:   Depression, recurrent; CFS (chronic fatigue syndrome); Fibromyalgia; Anxiety   Author:   Nancy Otero MD      Chief Complaint   2018 10:59 AM CDT   Depression med check      Interval History   patient here for depression follow up   reviewed PHQ9, results are reviewed, score is elevated but patient reports that she overall feels like she is doing well, starting counseling  due for refills  pain ongoing, no changes, using tramadol intermittently      Health Status   Allergies:    Allergic Reactions (All)  Severity Not Documented  Gabapentin (No reactions were documented)  Lexapro (Recurrent itchy hives during initial therapy with lexapro)  Nortriptyline (Had migraine at 50 mg dose)   Medications:  (Selected)   Prescriptions  Prescribed  clonazePAM 1 mg oral tablet: 1 tab(s) ( 1 mg ), po, bid, # 60 tab(s), 1 Refill(s), Type: Maintenance, Pharmacy: [facility] #[id number], 1 tab(s) po bid  cyclobenzaprine 10 mg oral tablet: 1 tab(s) ( 10 mg ), po, tid, # 90 tab(s), 11 Refill(s), Type: Soft Stop, Pharmacy: [facility] #2512, 1 tab(s) po tid  meloxicam 15 mg oral tablet: 1 tab(s) ( 15 mg ), po, daily, x 90 day(s), # 90 tab(s), 3 Refill(s), Type: Physician Stop, Pharmacy: [facility] #2512, patient will notify pharmacy when prescription is needed, 1 tab(s) po daily,x90 day(s)  omeprazole 20 mg oral delayed release capsule: 1 cap(s) ( 20 mg ), po, daily, # 90 cap(s), 3 Refill(s), Type: Maintenance, Pharmacy: [facility] #2512, patient will notify pharmacy when prescription is needed, 1 cap(s) po daily  traMADol 50 mg oral tablet: 1 tab(s) ( 50 mg ), PO, bid, Instructions: up to bid; okay to fill today; fill every 30 days, PRN: for pain, # 60 tab(s), 5 Refill(s), Type: Maintenance, Pharmacy: [facility] #[id number], 1 tab(s) po bid,PRN:for  pain,Instr:up to bid; okay to fill today...  venlafaxine 150 mg oral tablet, extended release: 2 tab(s) ( 300 mg ), po, daily, Instructions: either capsules or tablets - whichever is most cost effective for patient, # 180 tab(s), 3 Refill(s), Type: Maintenance, Pharmacy: [facility] #[id number], 2 tab(s) po daily,Instr:either capsules or tablets...  Documented Medications  Documented  Calcium-Vitamin D: Calcium-Vitamin D, Supply, 0 Refill(s), Type: Maintenance   Problem list:    All Problems  CFS (chronic fatigue syndrome) / SNOMED CT 34683991 / Confirmed  Eating disorder in remission / SNOMED CT 8Y6791KW-C497-1B0T-18C6-146399M32U95 / Confirmed  Fibromyalgia / SNOMED CT 92203938 / Confirmed  Anxiety / SNOMED CT 89080017 / Confirmed  Stress incontinence / SNOMED CT 400219863 / Confirmed  Migraine / SNOMED CT 13307722 / Confirmed  Obesity / SNOMED CT 8218135938 / Probable  PALPITATIONS / ICD-9-.1 / Confirmed  Depression, recurrent / SNOMED CT 407004875 / Confirmed      Histories   Past Medical History:    Active  Fibromyalgia (SNOMED CT 55056462)  CFS (chronic fatigue syndrome) (SNOMED CT 49522857)  PALPITATIONS (ICD-9-.1)  Comments:  4/14/2014 CDT 11:37 AM CDT - Pratibha Pastrana MD  Normal EKG and Holter  Anxiety (SNOMED CT 74726160)  Depression, recurrent (SNOMED CT 559536671)  Stress incontinence (SNOMED CT 252151286)  Migraine (SNOMED CT 07987454)  Eating disorder in remission (SNOMED CT 3B3170SS-I211-6O6Q-45O7-606427E61L25)  Resolved  Abnormal Pap Smear of Cervix:  Resolved.   Family History:    Kidney disease  Daughter     Procedure history:    Laparoscopic cholecystectomy (67153547) on 5/26/2016 at 46 Years.  LEEP.  Tonsillectomy.  Breast reduction (167233766).  Abdominoplasty (780513238).  Vaginal delivery ([id number]).  Exploratory lap and right dermoid excision 2007.   Social History:        Alcohol Assessment            1-2 times per month, 1 drinks/episode average.       Tobacco Assessment            Former smoker, Cigarettes, 20 per day.                     Comments:                      05/17/2017 - Paula MORANDivine                     Quit for 6 yrs. Restarted.      Substance Abuse Assessment            Never      Home and Environment Assessment            Risks in environment: Does not wear helmet.      Nutrition and Health Assessment            Type of diet: Regular.      Exercise and Physical Activity Assessment            Exercise frequency: Never.      Sexual Assessment            Sexually active: Yes.  Sexual orientation: Heterosexual.        Physical Examination   Vital Signs   6/29/2018 10:59 AM CDT Temperature Tympanic 98.3 DegF    Peripheral Pulse Rate 64 bpm    Pulse Site Radial artery    HR Method Manual    Systolic Blood Pressure 110 mmHg    Diastolic Blood Pressure 64 mmHg    Mean Arterial Pressure 79 mmHg    BP Site Left arm    BP Method Manual      Measurements from flowsheet : Measurements   6/29/2018 10:59 AM CDT Height Measured - Standard 64.5 in    Weight Measured - Standard 187.6 lb    BSA 1.97 m2    Body Mass Index 31.7 kg/m2  HI      General:  Alert and oriented, No acute distress.       Impression and Plan   Diagnosis     Depression, recurrent (YCZ01-ZG F33.1).     CFS (chronic fatigue syndrome) (KDT51-AK R53.82).     Fibromyalgia (BEU09-RW M79.7).     Anxiety (RCP65-FL F41.1).     Orders     Orders (Selected)   Prescriptions  Prescribed  clonazePAM 1 mg oral tablet: 1 tab(s) ( 1 mg ), po, bid, # 60 tab(s), 5 Refill(s), Type: Maintenance, Pharmacy: [facility] #[id number], 1 tab(s) Oral bid  cyclobenzaprine 10 mg oral tablet: 1 tab(s) ( 10 mg ), po, tid, # 90 tab(s), 11 Refill(s), Type: Soft Stop, Pharmacy: [facility] #[id number], 1 tab(s) Oral tid  meloxicam 15 mg oral tablet: 1 tab(s) ( 15 mg ), po, daily, x 90 day(s), # 90 tab(s), 3 Refill(s), Type: Physician Stop, Pharmacy: [facility] #[id number], patient will notify pharmacy when prescription is  needed, 1 tab(s) Oral daily,x90 day(s)  traMADol 50 mg oral tablet: 1 tab(s) ( 50 mg ), PO, bid, PRN: for pain, # 60 tab(s), 5 Refill(s), Type: Maintenance, Pharmacy: [facility] #2512, 1 tab(s) Oral bid,PRN:for pain  venlafaxine 150 mg oral tablet, extended release: 2 tab(s) ( 300 mg ), po, daily, Instructions: either capsules or tablets - whichever is most cost effective for patient, # 180 tab(s), 3 Refill(s), Type: Maintenance, Pharmacy: [facility] #2512, 2 tab(s) Oral daily,Instr:either capsules or tablet....

## 2022-02-15 NOTE — PROGRESS NOTES
Chief Complaint    UTI symptoms not resolving from 1/7/21. Just finished Sulfameth/Trimethoprim yesterday. Urgency, burning, coming and going.  Review of Systems      1 day hx of of dysuria, frequency, urgancy.  No fevers, chills, nausea, vomiting.  Last seen 8 days ago for same, treated view virtual visit with bactrim.  Sx returned yesterday, but seem better at this time.  No vaginal itching, irritation or change of discharge. no concern for STI.  Has a hx of ? kidney stone about 6 weeks ago seen in the ED.  had hematuria but never passed stone that she is aware of, pain did improve.  No imaging at that time.  Physical Exam   Vitals & Measurements    T: 96.3  F (Tympanic)  HR: 83 (Peripheral)  BP: 116/78  SpO2: 99%     WT: 199.6 lb       UA with 3-5 WBC,      0-2 RBC      moderate bacteria       culture pending          Assessment/Plan       Dysuria (R30.0)         Macrobid as ordered.  push fluids.  Await culture. FU prn         Ordered:          Culture, Urine, Routine* ([facility]), Specimen Type: Urine (Clean Catch), Collection Date: 01/16/21 11:06:00 CST          POC URINALYSIS, UA* ([facility]), Specimen Type: Urine, Collection Date: 01/16/21 11:06:00 CST                Orders:         nitrofurantoin, = 1 cap(s) ( 100 mg ), Oral, bid, x 7 day(s), # 14 cap(s), 0 Refill(s), Type: Acute, Pharmacy: [facility] #[id number], 1 cap(s) Oral bid,x7 day(s), 199.6, lb, 01/16/21 11:13:00 CST, Weight Measured, (Ordered)  Patient Information     Name:LUCIANA PEACE      Address:      [address] [place] [id number]     Sex:Female     YOB: 1970     [phone]     Emergency Contact:YINA HARRISON     MRN:[id number]     FIN:[id number]     Location:[facility]     Date of Service:01/16/2021      Primary Care Physician:       Nancy Otero MD, [phone]      Attending Physician:       Yanet IBARRA, Hilary CALDERA, [phone]  Problem List/Past Medical History    Ongoing      Anxiety     CFS (chronic fatigue syndrome)     Depression, recurrent     Eating disorder in remission     Fibromyalgia     Migraine     Obesity     PALPITATIONS       Comments: Normal EKG and Holter     Stress incontinence     Tobacco user    Historical     Abnormal Pap Smear of Cervix  Procedure/Surgical History     Laparoscopic cholecystectomy (05/26/2016)           Abdominoplasty           Breast reduction           Exploratory lap and right dermoid excision 2007           LEEP           Tonsillectomy           Vaginal delivery        Medications    clonazePAM 1 mg oral tablet, 1 mg= 1 tab(s), Oral, bid, 5 refills    hydrOXYzine    Macrobid 100 mg oral capsule, 100 mg= 1 cap(s), Oral, bid    venlafaxine 150 mg oral tablet, extended release, 300 mg= 2 tab(s), Oral, daily, 3 refills  Allergies    Lexapro (recurrent itchy hives during initial therapy with Lexapro)    gabapentin    nortriptyline (had migraine at 50 mg dose)  Social History    Smoking Status     Current every day smoker     Alcohol      1-2 times per month, 1 drinks/episode average.     Electronic Cigarette/Vaping      Electronic Cigarette Use: Never.     Exercise      Exercise frequency: Never.     Home/Environment      Risks in environment: Does not wear helmet.     Nutrition/Health      Type of diet: Regular.     Sexual      Sexually active: Yes. Sexual orientation: Heterosexual.     Substance Abuse      Never     Tobacco      10 or more cigarettes (1/2 pack or more)/day in last 30 days  Family History    Kidney disease: Daughter.    Sister: History is negative    Brother: History is unknown    Sister: History is negative  Immunizations      Vaccine Date Status          tetanus/diphth/pertuss (Tdap) adult/adol 03/25/2015 Given  Lab Results       Lab Results (Last 4 results within 90 days)        UA Epithelial Cells: Few [None  - Few] (01/16/21 11:28:00)       UA Mucous: Present (01/16/21 11:28:00)       UA WBC: 3-5 [None  - 5] (01/16/21 11:28:00)        UA RBC: 0-2 [None  - 2] (01/16/21 11:28:00)       UA Bacteria: Moderate [None  - Few] (01/16/21 11:28:00)

## 2022-02-15 NOTE — PROGRESS NOTES
Patient:   LUCIANA PEACE            MRN: [id number]            FIN: [id number]               Age:   47 years     Sex:  Female     :  1970   Associated Diagnoses:   Fibromyalgia; CFS (chronic fatigue syndrome); Anxiety; Depression, recurrent   Author:   Nancy Otero MD      Chief Complaint   2017 2:16 PM CDT    Med check-Anxiety.      History of Present Illness   Patient here to follow up on anxiety. Notes ongoing symptoms. Still feels like every day is a struggle. Looking into complimentary treatments like [facility] therapy. Still seeing a counselor.  Medications reviewed. Needs refills on cyclobenzaprine which did seem to help.  Discussed effexor. Would like to try higher dose. Will check with pharmacy about price and get back to me.      Health Status   Allergies:    Allergic Reactions (All)  Severity Not Documented  Gabapentin (No reactions were documented)  Lexapro (Recurrent itchy hives during initial therapy with lexapro)  Nortriptyline (Had migraine at 50 mg dose)   Medications:  (Selected)   Prescriptions  Prescribed  BuSpar 10 mg oral tablet: 1 tab(s) ( 10 mg ), PO, TID, # 270 tab(s), 3 Refill(s), Type: Maintenance, Pharmacy: [facility] #[id number], patient will notify pharmacy when prescription is needed, 1 tab(s) po tid  clonazePAM 1 mg oral tablet: 1 tab(s) ( 1 mg ), po, bid, # 60 tab(s), 5 Refill(s), Type: Maintenance, Pharmacy: [facility] #[id number], Pt needs appt for further refills, 1 tab(s) po bid  cyclobenzaprine 10 mg oral tablet: 1 tab(s) ( 10 mg ), po, tid, # 90 tab(s), 2 Refill(s), Type: Soft Stop, Pharmacy: [facility] #[id number], 1 tab(s) po tid  meloxicam 15 mg oral tablet: 1 tab(s) ( 15 mg ), po, daily, x 90 day(s), # 90 tab(s), 3 Refill(s), Type: Physician Stop, Pharmacy: [facility] #[id number], patient will notify pharmacy when prescription is needed, 1 tab(s) po daily,x90 day(s)  omeprazole 20 mg oral delayed release capsule: 1 cap(s) ( 20 mg ), po, daily, # 90 cap(s), 3  Refill(s), Type: Maintenance, Pharmacy: [facility] #[id number], patient will notify pharmacy when prescription is needed, 1 cap(s) po daily  traMADol 50 mg oral tablet: 1 tab(s) ( 50 mg ), PO, bid, Instructions: up to bid; okay to fill today; fill every 30 days, PRN: for pain, # 60 tab(s), 5 Refill(s), Type: Maintenance, Pharmacy: [facility] #[id number], 1 tab(s) po bid,PRN:for pain,Instr:up to bid; okay to fill today...  venlafaxine 225 mg oral tablet, extended release: 1 tab(s) ( 225 mg ), po, daily, # 30 tab(s), 11 Refill(s), Type: Maintenance, Pharmacy: [facility] #[id number], 1 tab(s) po daily  Documented Medications  Documented  Calcium-Vitamin D: Calcium-Vitamin D, Supply, 0 Refill(s), Type: Maintenance   Problem list:    All Problems  Anxiety / SNOMED CT 66091001 / Confirmed  CFS (chronic fatigue syndrome) / SNOMED CT 39187277 / Confirmed  Depression, recurrent / SNOMED CT 184891320 / Confirmed  Eating disorder in remission / SNOMED CT [id number] / Confirmed  Fibromyalgia / SNOMED CT 33179604 / Confirmed  Migraine / SNOMED CT 34826920 / Confirmed  Obesity / SNOMED CT 6285860028 / Probable  PALPITATIONS / ICD-9-.1 / Confirmed  Stress incontinence / SNOMED CT 714404836 / Confirmed      Histories   Past Medical History:    Active  Fibromyalgia (SNOMED CT 31289593)  CFS (chronic fatigue syndrome) (SNOMED CT 81406920)  PALPITATIONS (ICD-9-.1)  Comments:  4/14/2014 CDT 11:37 AM CDT - Pratibha Pastrana MD  Normal EKG and Holter  Anxiety (SNOMED CT 41931233)  Depression, recurrent (SNOMED CT 752320733)  Stress incontinence (SNOMED CT 098052465)  Migraine (SNOMED CT 49260603)  Eating disorder in remission (SNOMED CT [id number]-[id number])  Resolved  Abnormal Pap Smear of Cervix:  Resolved.   Family History:    Kidney disease  Daughter     Procedure history:    Laparoscopic cholecystectomy ([id number]) on 5/26/2016 at 46 Years.  LEEP.  Tonsillectomy.  Breast reduction  (906655872).  Abdominoplasty (858842068).  Vaginal delivery ([id number]).  Exploratory lap and right dermoid excision 2007.      Physical Examination   Vital Signs   5/17/2017 2:16 PM CDT Temperature Tympanic 99 DegF    Peripheral Pulse Rate 88 bpm    Pulse Site Radial artery    HR Method Manual    Systolic Blood Pressure 116 mmHg    Diastolic Blood Pressure 74 mmHg    Mean Arterial Pressure 88 mmHg    BP Site Left arm    BP Method Manual      Measurements from flowsheet : Measurements   5/17/2017 2:16 PM CDT    Height Measured - Standard                63.75 in     General:  Alert and oriented, No acute distress.    Psychiatric:  Cooperative, No Abnormal / Psychotic thoughts.         Mood and affect: Flat, Sad.         Judgment: Able to make sensible decisions.         Thought process: Appropriate.       Impression and Plan   Diagnosis     Fibromyalgia (HZI65-RA M79.7).     CFS (chronic fatigue syndrome) (RAB72-NB R53.82).     Anxiety (WZG25-LK F41.1).     Depression, recurrent (BZP59-TQ F33.1).     Course:  incomplete response to treatment.    Plan:  consider increased dose of effexor. Will check into cost..    Orders     Orders (Selected)   Prescriptions  Prescribed  cyclobenzaprine 10 mg oral tablet: 1 tab(s) ( 10 mg ), po, tid, # 90 tab(s), 11 Refill(s), Type: Soft Stop, Pharmacy: [facility] #[id number], 1 tab(s) po tid.     will be due for follow up in August.

## 2022-02-15 NOTE — TELEPHONE ENCOUNTER
---------------------  From: Esthela Horvath RN (Phone Messages Pool ([address] [place] [address]))   To: [employer] Message Pool ([address] [place] [address]);     Sent: 2/9/2021 8:50:53 AM CST  Subject: Ongoing cystitis     Time of Call:  0705  Return call at:_     Person Calling:  patient  Phone number:  [phone]    Note:   Patient calls she has ongoing symptoms of cystitis. She is still on the Cipro. She has 2 days left. She still has the sensation of full bladder constantly even after she urinates. She has a sense of urinary urgency constantly. She urinates what she feels is a normal amount. She is using Urostat as well as her antibiotic. The symptoms felt better over the week end but it is bad again today. She has no blood in urine, no fever. She feels like she has bladder cramping.  She has slight nausea today as well. She did decline an appointment today but will be seen if she feels worse or has blood in urine or fever. She will make an appointment with [facility] for 2/10/2021.     Last office visit and reason:  1/28/21 Cystitis---------------------  From: Apolonia Hutchison CMA ([employer] Message Pool ([address] [place] [address]))   To: Nancy Otero MD;     Sent: 2/9/2021 12:06:56 PM CST  Subject: FW: Ongoing cystitis     FYI - has video visit with you on 2/10Noted. Thanks---------------------  From: Nancy Otero MD   To: [employer] Pool ([address]);     Sent: 2/9/2021 3:16:41 PM CST  Subject: RE: Ongoing cystitis

## 2022-02-15 NOTE — TELEPHONE ENCOUNTER
---------------------  From: Adelita Chopra RN (Phone Messages Pool ([id number]_[place] [facility] [place]))   To: [facility] Message Pool ([id number]_[facility]);     Sent: 2/1/2021 3:42:06 PM CST  Subject: Phone Message     Phone Message    PCP:   NANDO      Time of Call:  1437       Person Calling:  Pt  Phone number:  [phone]    Returned call at: [phone]    Note:   Pt called follow up on urine culture. She states that she did not feel well over the weekend but is seeing small improvement. She states she only has two more days left and wonders if an extension of the medication would be beneficial.   She wonders what [facility]'s thoughts are on what could be causing this.  She is aware [facility] OC until Wednesday - OK waiting to hear response.     Last office visit and reason:  _Please advise      CULTURE, URINE, ROUTINE         Micro Number:      [id number]    Test Status:       Final    Specimen Source:   URINE, CLEAN CATCH    Specimen Quality:  Adequate    Result:            Growth of mixed chun was isolated, suggesting                       probable contamination. No further testing will                       be performed. If clinically indicated,                        recollection using a method to minimize                       contamination, with prompt transfer to Urine                       Culture Transport Tube, is recommended.---------------------  From: Therese Langley CMA ([facility] Message Pool ([id number]))   To: Nancy Otero MD;     Sent: 2/2/2021 9:40:26 AM CST  Subject: FW: Phone Message  ** Submitted: **  Order:ciprofloxacin (Cipro 500 mg oral tablet)  1 tab(s)  Oral  q12 hrs  Qty:  14 tab(s)        Duration:  7 day(s)        Refills:  0          Substitutions Allowed     Route To Pharmacy - [facility] #[id number]    Signed by Nancy Otero MD  2/2/2021 9:07:00 PM [facility]        Culture did not show any specific bacteria. OK to continue cipro for another 7 days - refill sent.---------------------  From: Shanna CAMARA  Nancy   To: [facility] Message Pool ([address]_[facility]);     Sent: 2/2/2021 3:07:37 PM CST  Subject: RE: Phone MessageLMTCB @ 12:18pmPt LM at 2:28pm returning call. OK to LM at [phone].  Returned call and LM on identified voicemail with [facility] response. Asked pt to follow up if not improving or worsening symptoms.

## 2022-02-15 NOTE — NURSING NOTE
Comprehensive Intake Entered On:  9/13/2019 9:37 AM CDT    Performed On:  9/13/2019 9:34 AM CDT by Ade Gil CMA               Summary   Systolic Blood Pressure :   118 mmHg   Diastolic Blood Pressure :   82 mmHg (HI)    Mean Arterial Pressure :   94 mmHg   Halicassie Ade LEGGETT - 9/13/2019 9:37 AM CDT   Chief Complaint :   c/o cough, ear pain, weak, chest hurts x 3 days   Menstrual Status :   Menarcheal   Ht/Wt Measurement Refused by Patient? :   Yes   Peripheral Pulse Rate :   95 bpm   BP Site :   Right arm   BP Method :   Manual   HR Method :   Electronic   Temperature Tympanic :   97.4 DegF(Converted to: 36.3 DegC)  (LOW)    Oxygen Saturation :   97 %   Ade Gil CMA - 9/13/2019 9:34 AM CDT   Health Status   Allergies Verified? :   Yes   Medication History Verified? :   Yes   Immunizations Current :   Yes   Medical History Verified? :   Yes   Pre-Visit Planning Status :   Not completed   Ade Gil CMA - 9/13/2019 9:34 AM CDT   Consents   Consent for Immunization Exchange :   Consent Granted   Consent for Immunizations to Providers :   Consent Granted   Ade Gil CMA - 9/13/2019 9:34 AM CDT   Meds / Allergies   (As Of: 9/13/2019 9:37:13 AM CDT)   Allergies (Active)   gabapentin  Estimated Onset Date:   Unspecified ; Created By:   Eden Paniagua CMA; Reaction Status:   Active ; Category:   Drug ; Substance:   gabapentin ; Type:   Allergy ; Updated By:   Eden Paniagua CMA; Reviewed Date:   9/13/2019 9:36 AM CDT      Lexapro  Estimated Onset Date:   Unspecified ; Reactions:   recurrent itchy hives during initial therapy with Lexapro ; Created By:   Marisela Lizama MA; Reaction Status:   Active ; Category:   Drug ; Substance:   Lexapro ; Type:   Allergy ; Updated By:   Marisela Lizama MA; Reviewed Date:   9/13/2019 9:36 AM CDT      nortriptyline  Estimated Onset Date:   Unspecified ; Reactions:   had migraine at 50 mg dose ; Created By:   Marisela Lizama MA; Reaction Status:   Active ; Category:    Drug ; Substance:   nortriptyline ; Type:   Allergy ; Updated By:   Marisela Lizama MA; Reviewed Date:   9/13/2019 9:36 AM CDT        Medication List   (As Of: 9/13/2019 9:37:13 AM CDT)   Prescription/Discharge Order    clonazePAM  :   clonazePAM ; Status:   Prescribed ; Ordered As Mnemonic:   clonazePAM 1 mg oral tablet ; Simple Display Line:   1 mg, 1 tab(s), po, bid, 60 tab(s), 5 Refill(s) ; Ordering Provider:   Nancy Otero MD; Catalog Code:   clonazePAM ; Order Dt/Tm:   6/29/2018 11:21:11 AM          tiZANidine  :   tiZANidine ; Status:   Prescribed ; Ordered As Mnemonic:   tiZANidine 2 mg oral tablet ; Simple Display Line:   2 mg, 1 tab(s), Oral, q8 hrs, 90 tab(s), 0 Refill(s) ; Ordering Provider:   Nancy Otero MD; Catalog Code:   tiZANidine ; Order Dt/Tm:   8/20/2018 3:34:02 PM          venlafaxine  :   venlafaxine ; Status:   Prescribed ; Ordered As Mnemonic:   venlafaxine 150 mg oral tablet, extended release ; Simple Display Line:   300 mg, 2 tab(s), po, daily, either capsules or tablets - whichever is most cost effective for patient, 180 tab(s), 3 Refill(s) ; Ordering Provider:   Nancy Otero MD; Catalog Code:   venlafaxine ; Order Dt/Tm:   6/29/2018 11:21:24 AM            Home Meds    raNITIdine  :   raNITIdine ; Status:   Documented ; Ordered As Mnemonic:   Zantac ; Simple Display Line:   0 Refill(s) ; Catalog Code:   raNITIdine ; Order Dt/Tm:   9/13/2019 9:37:06 AM

## 2022-02-15 NOTE — NURSING NOTE
Comprehensive Intake Entered On:  1/28/2021 7:15 AM CST    Performed On:  1/28/2021 7:12 AM CST by Therese Langley CMA               Summary   Last Menstrual Period :   12/31/2020 CST   Therese Langlye CMA - 1/28/2021 7:15 AM CST   Chief Complaint :   C/o ongoing bladder concerns-Feels her bladder is full and c/o bladder spasms. Has been see in ER after siobhan and has been to  here and put on abx but then told she had no infection. Per ER said she may have passed a kidney stone.   Menstrual Status :   Menarcheal   Additonal Information :   Verbal consent obtained for video visit   Therese Langley CMA - 1/28/2021 7:12 AM CST   Health Status   Allergies Verified? :   Yes   Medication History Verified? :   Yes   Immunizations Current :   Yes   Pre-Visit Planning Status :   Completed   Tobacco Use? :   Current every day smoker   Therese Langley CMA - 1/28/2021 7:12 AM CST   Consents   Consent for Immunization Exchange :   Consent Granted   Consent for Immunizations to Providers :   Consent Granted   Therese Langley CMA - 1/28/2021 7:12 AM CST   Meds / Allergies   (As Of: 1/28/2021 7:15:52 AM CST)   Allergies (Active)   gabapentin  Estimated Onset Date:   Unspecified ; Created By:   Eden Paniagua CMA; Reaction Status:   Active ; Category:   Drug ; Substance:   gabapentin ; Type:   Allergy ; Updated By:   Eden Paniagua CMA; Reviewed Date:   1/16/2021 11:19 AM CST      Lexapro  Estimated Onset Date:   Unspecified ; Reactions:   recurrent itchy hives during initial therapy with Lexapro ; Created By:   Marisela Lizama MA; Reaction Status:   Active ; Category:   Drug ; Substance:   Lexapro ; Type:   Allergy ; Updated By:   Marisela Lizama MA; Reviewed Date:   1/16/2021 11:19 AM CST      nortriptyline  Estimated Onset Date:   Unspecified ; Reactions:   had migraine at 50 mg dose ; Created By:   Marisela Lizama MA; Reaction Status:   Active ; Category:   Drug ; Substance:   nortriptyline ; Type:   Allergy ; Updated By:    Marisela Lizama MA; Reviewed Date:   1/16/2021 11:19 AM CST        Medication List   (As Of: 1/28/2021 7:15:52 AM CST)   Prescription/Discharge Order    venlafaxine  :   venlafaxine ; Status:   Prescribed ; Ordered As Mnemonic:   venlafaxine 150 mg oral tablet, extended release ; Simple Display Line:   300 mg, 2 tab(s), po, daily, either capsules or tablets - whichever is most cost effective for patient, 180 tab(s), 3 Refill(s) ; Ordering Provider:   Nancy Otero MD; Catalog Code:   venlafaxine ; Order Dt/Tm:   6/29/2018 11:21:24 AM CDT          clonazePAM  :   clonazePAM ; Status:   Prescribed ; Ordered As Mnemonic:   clonazePAM 1 mg oral tablet ; Simple Display Line:   1 mg, 1 tab(s), po, bid, 60 tab(s), 5 Refill(s) ; Ordering Provider:   Nancy Otero MD; Catalog Code:   clonazePAM ; Order Dt/Tm:   6/29/2018 11:21:11 AM CDT            Home Meds    hydrOXYzine  :   hydrOXYzine ; Status:   Documented ; Ordered As Mnemonic:   hydrOXYzine ; Simple Display Line:   0 Refill(s) ; Catalog Code:   hydrOXYzine ; Order Dt/Tm:   10/8/2019 12:02:28 PM CDT            ID Risk Screen   Recent Travel History :   No recent travel   Family Member Travel History :   No recent travel   Other Exposure to Infectious Disease :   Unknown   COVID-19 Testing Status :   Positive COVID-19 test greater than 30 days ago   Therese Langley CMA - 1/28/2021 7:12 AM CST

## 2022-02-15 NOTE — NURSING NOTE
Comprehensive Intake Entered On:  5/30/2019 10:04 AM CDT    Performed On:  5/30/2019 10:01 AM CDT by Ade Gil CMA               Summary   Chief Complaint :   Right earache and sore throat x few weeks   Menstrual Status :   Menarcheal   Ht/Wt Measurement Refused by Patient? :   Yes   Systolic Blood Pressure :   104 mmHg   Diastolic Blood Pressure :   60 mmHg   Mean Arterial Pressure :   75 mmHg   Peripheral Pulse Rate :   76 bpm   BP Site :   Left arm   Pulse Site :   Radial artery   BP Method :   Manual   HR Method :   Manual   Temperature Tympanic :   98.2 DegF(Converted to: 36.8 DegC)    Ade Gil CMA - 5/30/2019 10:01 AM CDT   Health Status   Allergies Verified? :   Yes   Medication History Verified? :   Yes   Immunizations Current :   Yes   Medical History Verified? :   Yes   Pre-Visit Planning Status :   N/A   Tobacco Use? :   Current every day smoker   Tobacco Cessation Review :   Not ready to quit   Ade Gil CMA - 5/30/2019 10:01 AM CDT   Consents   Consent for Immunization Exchange :   Consent Granted   Consent for Immunizations to Providers :   Consent Granted   Ade Gil CMA - 5/30/2019 10:01 AM CDT   Meds / Allergies   (As Of: 5/30/2019 10:04:41 AM CDT)   Allergies (Active)   gabapentin  Estimated Onset Date:   Unspecified ; Created By:   Eden Paniagua CMA; Reaction Status:   Active ; Category:   Drug ; Substance:   gabapentin ; Type:   Allergy ; Updated By:   Eden Paniagua CMA; Reviewed Date:   5/30/2019 10:02 AM CDT      Lexapro  Estimated Onset Date:   Unspecified ; Reactions:   recurrent itchy hives during initial therapy with Lexapro ; Created By:   Marisela Lizama MA; Reaction Status:   Active ; Category:   Drug ; Substance:   Lexapro ; Type:   Allergy ; Updated By:   Marisela Lizama MA; Reviewed Date:   5/30/2019 10:02 AM CDT      nortriptyline  Estimated Onset Date:   Unspecified ; Reactions:   had migraine at 50 mg dose ; Created By:   Marisela Lizama MA; Reaction  Status:   Active ; Category:   Drug ; Substance:   nortriptyline ; Type:   Allergy ; Updated By:   Marisela Lizama MA; Reviewed Date:   5/30/2019 10:02 AM CDT        Medication List   (As Of: 5/30/2019 10:04:41 AM CDT)   Prescription/Discharge Order    clonazePAM  :   clonazePAM ; Status:   Prescribed ; Ordered As Mnemonic:   clonazePAM 1 mg oral tablet ; Simple Display Line:   1 mg, 1 tab(s), po, bid, 60 tab(s), 5 Refill(s) ; Ordering Provider:   Nancy Otero MD; Catalog Code:   clonazePAM ; Order Dt/Tm:   6/29/2018 11:21:11 AM          meloxicam  :   meloxicam ; Status:   Suspended ; Ordered As Mnemonic:   meloxicam 15 mg oral tablet ; Simple Display Line:   15 mg, 1 tab(s), po, daily, for 90 day(s), 90 tab(s), 3 Refill(s) ; Ordering Provider:   Axel Greer MD; Catalog Code:   meloxicam ; Order Dt/Tm:   6/29/2018 11:21:17 AM          omeprazole  :   omeprazole ; Status:   Prescribed ; Ordered As Mnemonic:   omeprazole 20 mg oral delayed release capsule ; Simple Display Line:   20 mg, 1 cap(s), po, daily, 90 cap(s), 3 Refill(s) ; Ordering Provider:   Nancy Otero MD; Catalog Code:   omeprazole ; Order Dt/Tm:   8/18/2016 4:06:41 PM          predniSONE  :   predniSONE ; Status:   Processing ; Ordered As Mnemonic:   predniSONE 10 mg oral tablet ; Ordering Provider:   Axel Greer MD; Action Display:   Complete ; Catalog Code:   predniSONE ; Order Dt/Tm:   5/30/2019 10:03:16 AM          tiZANidine  :   tiZANidine ; Status:   Prescribed ; Ordered As Mnemonic:   tiZANidine 2 mg oral tablet ; Simple Display Line:   2 mg, 1 tab(s), Oral, q8 hrs, 90 tab(s), 0 Refill(s) ; Ordering Provider:   Nancy Otero MD; Catalog Code:   tiZANidine ; Order Dt/Tm:   8/20/2018 3:34:02 PM          venlafaxine  :   venlafaxine ; Status:   Prescribed ; Ordered As Mnemonic:   venlafaxine 150 mg oral tablet, extended release ; Simple Display Line:   300 mg, 2 tab(s), po, daily, either capsules or tablets -  whichever is most cost effective for patient, 180 tab(s), 3 Refill(s) ; Ordering Provider:   Nancy Otero MD; Catalog Code:   venlafaxine ; Order Dt/Tm:   6/29/2018 11:21:24 AM            Home Meds    Miscellaneous Prescription  :   Miscellaneous Prescription ; Status:   Documented ; Ordered As Mnemonic:   Calcium-Vitamin D ; Catalog Code:   Miscellaneous Prescription ; Order Dt/Tm:   4/7/2014 2:45:15 PM          tiZANidine  :   tiZANidine ; Status:   Processing ; Ordered As Mnemonic:   tiZANidine 2 mg oral tablet ; Action Display:   Complete ; Catalog Code:   tiZANidine ; Order Dt/Tm:   5/30/2019 10:03:16 AM

## 2022-02-15 NOTE — NURSING NOTE
Comprehensive Intake Entered On:  2/10/2021 8:16 AM CST    Performed On:  2/10/2021 8:14 AM CST by Mary Cabezas               Summary   Chief Complaint :   f/u cystitis ER visit, needs referral to urology. Verbal consent given for video visit.    Menstrual Status :   Menarcheal   Mary Cabezas - 2/10/2021 8:14 AM CST   Health Status   Allergies Verified? :   Yes   Medication History Verified? :   Yes   Immunizations Current :   Yes   Medical History Verified? :   Yes   Pre-Visit Planning Status :   Completed   Tobacco Use? :   Current every day smoker   Mary Cabezas - 2/10/2021 8:14 AM CST   Consents   Consent for Immunization Exchange :   Consent Granted   Consent for Immunizations to Providers :   Consent Granted   Mary Cabezas - 2/10/2021 8:14 AM CST   Meds / Allergies   (As Of: 2/10/2021 8:16:43 AM CST)   Allergies (Active)   gabapentin  Estimated Onset Date:   Unspecified ; Created By:   Eden Paniagua CMA; Reaction Status:   Active ; Category:   Drug ; Substance:   gabapentin ; Type:   Allergy ; Updated By:   Eden Paniagua CMA; Reviewed Date:   2/10/2021 8:16 AM CST      Lexapro  Estimated Onset Date:   Unspecified ; Reactions:   recurrent itchy hives during initial therapy with Lexapro ; Created By:   Marisela Lizama MA; Reaction Status:   Active ; Category:   Drug ; Substance:   Lexapro ; Type:   Allergy ; Updated By:   Marisela Lizama MA; Reviewed Date:   2/10/2021 8:16 AM CST      nortriptyline  Estimated Onset Date:   Unspecified ; Reactions:   had migraine at 50 mg dose ; Created By:   Marisela Lizama MA; Reaction Status:   Active ; Category:   Drug ; Substance:   nortriptyline ; Type:   Allergy ; Updated By:   Marisela Lizama MA; Reviewed Date:   2/10/2021 8:16 AM CST        Medication List   (As Of: 2/10/2021 8:16:43 AM CST)   Prescription/Discharge Order    venlafaxine  :   venlafaxine ; Status:   Prescribed ; Ordered As Mnemonic:   venlafaxine 150 mg oral tablet,  extended release ; Simple Display Line:   300 mg, 2 tab(s), po, daily, either capsules or tablets - whichever is most cost effective for patient, 180 tab(s), 3 Refill(s) ; Ordering Provider:   Nancy Otero MD; Catalog Code:   venlafaxine ; Order Dt/Tm:   6/29/2018 11:21:24 AM CDT          phenazopyridine  :   phenazopyridine ; Status:   Prescribed ; Ordered As Mnemonic:   Pyridium 200 mg oral tablet ; Simple Display Line:   200 mg, 1 tab(s), Oral, tid, 21 tab(s), 2 Refill(s) ; Ordering Provider:   Nancy Otero MD; Catalog Code:   phenazopyridine ; Order Dt/Tm:   1/28/2021 7:33:12 AM CST          clonazePAM  :   clonazePAM ; Status:   Prescribed ; Ordered As Mnemonic:   clonazePAM 1 mg oral tablet ; Simple Display Line:   1 mg, 1 tab(s), po, bid, 60 tab(s), 5 Refill(s) ; Ordering Provider:   Nancy Otero MD; Catalog Code:   clonazePAM ; Order Dt/Tm:   6/29/2018 11:21:11 AM CDT          ciprofloxacin  :   ciprofloxacin ; Status:   Completed ; Ordered As Mnemonic:   Cipro 500 mg oral tablet ; Simple Display Line:   500 mg, 1 tab(s), Oral, q12 hrs, for 7 day(s), 14 tab(s), 0 Refill(s) ; Ordering Provider:   Nancy Otero MD; Catalog Code:   ciprofloxacin ; Order Dt/Tm:   2/2/2021 3:07:24 PM CST            Home Meds    hydrOXYzine  :   hydrOXYzine ; Status:   Documented ; Ordered As Mnemonic:   hydrOXYzine ; Simple Display Line:   0 Refill(s) ; Catalog Code:   hydrOXYzine ; Order Dt/Tm:   10/8/2019 12:02:28 PM CDT            ID Risk Screen   Recent Travel History :   No recent travel   Family Member Travel History :   No recent travel   Other Exposure to Infectious Disease :   Unknown   COVID-19 Testing Status :   No COVID-19 test performed   Mary Cabezas - 2/10/2021 8:14 AM CST

## 2022-02-15 NOTE — TELEPHONE ENCOUNTER
---------------------  From: Ade Gil CMA (Phone Messages Pool ([id number]_[place]))   To: [employer] Message Pool ([id number]_[facility]);     Sent: 3/4/2021 2:17:35 PM CST  Subject: phone note- bladder pain     Phone Message    PCP:    NANDO      Time of Call:  2:04 pm       Person Calling:  Pinky  Phone number:  [phone] O.K. to leave detailed message    Note:  Patient called clinic stating her bladder pain is over the top painful. Medication is not helping anymore. Wondering if [facility] could prescribe something else for the pain or increase current dose? Has an upcoming appointment with specialist. Patient says the ER won't help her because it's not an emergency.( Patient sounded tearful in message)    Last office visit and reason:  2/10/2021 cystitis    Transferred to: [facility] message pool---------------------  From: Therese Langley CMA ([email or web address] Message Pool ([id number]))   To: Nancy Otero MD;     Sent: 3/4/2021 2:22:35 PM CST  Subject: FW: phone note- bladder pain  ** Submitted: **  Order:traMADol (traMADol 50 mg oral tablet)  1 tab(s)  Oral  qid  Qty:  40 tab(s)        Duration:  10 day(s)        Refills:  0          Substitutions Allowed     PRN  for pain      Route To Pharmacy - [id number]    Signed by Nancy Otero MD  3/4/2021 8:51:00 PM UTCCalled patient back. On Pyridium, amitriptyline, naproxen. Pain is ongoing and patient is miserable. Sees specialist on the 11th. Has called them to see if they can see her sooner. Will add tramadol. Call if not helping---------------------  From: Nancy Otero MD   To: [employer] Pool ([id number]_[facility]);     Sent: 3/4/2021 2:56:17 PM CST  Subject: RE: phone note- bladder pain

## 2022-02-15 NOTE — NURSING NOTE
Comprehensive Intake Entered On:  1/7/2021 3:33 PM CST    Performed On:  1/7/2021 3:30 PM CST by Jane Ramsey MA               Summary   Chief Complaint :   verbal consent given for video visit.  c/o UTI sx--dysuria, increased frequency/urgency x3 days.  denies hematuria.   Menstrual Status :   Menarcheal   Jane Ramsey MA - 1/7/2021 3:30 PM CST   Health Status   Allergies Verified? :   Yes   Medication History Verified? :   Yes   Immunizations Current :   Yes   Medical History Verified? :   Yes   Pre-Visit Planning Status :   Not completed   Tobacco Use? :   Former smoker   Jane Ramsey MA - 1/7/2021 3:30 PM CST   Consents   Consent for Immunization Exchange :   Consent Granted   Consent for Immunizations to Providers :   Consent Granted   Jane Ramsey MA - 1/7/2021 3:30 PM CST   Meds / Allergies   (As Of: 1/7/2021 3:33:53 PM CST)   Allergies (Active)   gabapentin  Estimated Onset Date:   Unspecified ; Created By:   Eden Paniagua CMA; Reaction Status:   Active ; Category:   Drug ; Substance:   gabapentin ; Type:   Allergy ; Updated By:   Eden Paniagua CMA; Reviewed Date:   10/8/2019 12:02 PM CDT      Lexapro  Estimated Onset Date:   Unspecified ; Reactions:   recurrent itchy hives during initial therapy with Lexapro ; Created By:   Marisela Lizama MA; Reaction Status:   Active ; Category:   Drug ; Substance:   Lexapro ; Type:   Allergy ; Updated By:   Marisela Lizama MA; Reviewed Date:   10/8/2019 12:02 PM CDT      nortriptyline  Estimated Onset Date:   Unspecified ; Reactions:   had migraine at 50 mg dose ; Created By:   Marisela Lizama MA; Reaction Status:   Active ; Category:   Drug ; Substance:   nortriptyline ; Type:   Allergy ; Updated By:   Marisela Lizama MA; Reviewed Date:   10/8/2019 12:02 PM CDT        Medication List   (As Of: 1/7/2021 3:33:53 PM CST)   Prescription/Discharge Order    clonazePAM  :   clonazePAM ; Status:   Prescribed ; Ordered As Mnemonic:   clonazePAM 1 mg oral  tablet ; Simple Display Line:   1 mg, 1 tab(s), po, bid, 60 tab(s), 5 Refill(s) ; Ordering Provider:   Nancy Otero MD; Catalog Code:   clonazePAM ; Order Dt/Tm:   6/29/2018 11:21:11 AM CDT          venlafaxine  :   venlafaxine ; Status:   Prescribed ; Ordered As Mnemonic:   venlafaxine 150 mg oral tablet, extended release ; Simple Display Line:   300 mg, 2 tab(s), po, daily, either capsules or tablets - whichever is most cost effective for patient, 180 tab(s), 3 Refill(s) ; Ordering Provider:   Nancy Otero MD; Catalog Code:   venlafaxine ; Order Dt/Tm:   6/29/2018 11:21:24 AM CDT            Home Meds    hydrOXYzine  :   hydrOXYzine ; Status:   Documented ; Ordered As Mnemonic:   hydrOXYzine ; Simple Display Line:   0 Refill(s) ; Catalog Code:   hydrOXYzine ; Order Dt/Tm:   10/8/2019 12:02:28 PM CDT            ID Risk Screen   Recent Travel History :   No recent travel   Family Member Travel History :   No recent travel   Other Exposure to Infectious Disease :   Unknown   Roxy SEO, Jane - 1/7/2021 3:30 PM CST   Social History   Social History   (As Of: 1/7/2021 3:33:53 PM CST)   Alcohol:        1-2 times per month, 1 drinks/episode average.   (Last Updated: 5/17/2017 3:52:48 PM CDT by Divine Mitchell LPN)          Tobacco:        Former smoker, quit more than 30 days ago   (Last Updated: 12/16/2020 5:00:19 PM CST by Delilah Villa)          Electronic Cigarette/Vaping:        Electronic Cigarette Use: Unknown/Not obtained.   (Last Updated: 12/16/2020 5:00:22 PM CST by Delilah Villa)          Substance Abuse:        Never   (Last Updated: 4/23/2014 11:28:42 AM CDT by Chelo Saenz MA)          Home/Environment:        Risks in environment: Does not wear helmet.   (Last Updated: 3/26/2015 12:59:08 PM CDT by Tracey Mendez MA)          Nutrition/Health:        Type of diet: Regular.   (Last Updated: 3/26/2015 12:59:14 PM CDT by Tracey Mendez MA)          Exercise:        Exercise  frequency: Never.   (Last Updated: 3/26/2015 12:59:25 PM CDT by Tracey Mendez MA)          Sexual:        Sexually active: Yes.  Sexual orientation: Heterosexual.   (Last Updated: 4/23/2014 11:29:24 AM CDT by Chelo Saenz MA)

## 2022-02-15 NOTE — TELEPHONE ENCOUNTER
---------------------  From: Kyara Frey CMA (Phone Messages Pool ([id number]))   To: [facility] Message Pool ([id number]);     Sent: 1/15/2021 1:16:17 PM CST  Subject: Return UTI symptoms     Phone Message    PCP:   JULISA      Time of Call:  1302       Person Calling:  Patient   Phone number:  [phone], LDM    Returned call at: _    Note:   Had a virtual visit with [facility] about a week ago. On the last day of the antibiotic urgency of frequent urination noticed today. She would like to know what [facility] recommends?    Last office visit and reason:  01/07/21 video appt. JULISA---------------------  From: Sherri Luna ([facility] Message Pool ([id number]))   To: Brenda Ricci MD;     Sent: 1/15/2021 1:33:50 PM CST  Subject: FW: Return UTI symptoms---------------------  From: Brenda Ricci MD   To: [facility] Message Pool ([id number]_[place]);     Sent: 1/15/2021 3:03:52 PM CST  Subject: RE: Return UTI symptoms     please invite pt to come in today for an appointment, or could be seen in urgent care over the weekend, thanksPt notified to go to Urgent care.

## 2022-02-15 NOTE — NURSING NOTE
Comprehensive Intake Entered On:  12/16/2020 5:00 PM CST    Performed On:  12/16/2020 4:59 PM CST by Delilah Villa               Summary   Chief Complaint :   abdominal pain on both sides going into back, worse on the left side.  worsened today.  hx kidney stone.  COVID ON 12/8   Menstrual Status :   Menarcheal   Delilah Villa - 12/16/2020 4:59 PM CST   ID Risk Screen   Recent Travel History :   No recent travel   Family Member Travel History :   No recent travel   Other Exposure to Infectious Disease :   Unknown   COVID-19 Testing Status :   Positive COVID-19 test in the last 30 days   Delilah Villa - 12/16/2020 4:59 PM CST   Social History   Social History   (As Of: 12/16/2020 5:00:29 PM CST)   Alcohol:        1-2 times per month, 1 drinks/episode average.   (Last Updated: 5/17/2017 3:52:48 PM CDT by Divine Mitchell LPN)          Tobacco:        Former smoker, quit more than 30 days ago   (Last Updated: 12/16/2020 5:00:19 PM CST by Delilah Villa)          Electronic Cigarette/Vaping:        Electronic Cigarette Use: Unknown/Not obtained.   (Last Updated: 12/16/2020 5:00:22 PM CST by Delilah Villa)          Substance Abuse:        Never   (Last Updated: 4/23/2014 11:28:42 AM CDT by Chelo Saenz MA)          Home/Environment:        Risks in environment: Does not wear helmet.   (Last Updated: 3/26/2015 12:59:08 PM CDT by Tracey Mendez MA)          Nutrition/Health:        Type of diet: Regular.   (Last Updated: 3/26/2015 12:59:14 PM CDT by Tracey Mendez MA)          Exercise:        Exercise frequency: Never.   (Last Updated: 3/26/2015 12:59:25 PM CDT by Tracey Mendez MA)          Sexual:        Sexually active: Yes.  Sexual orientation: Heterosexual.   (Last Updated: 4/23/2014 11:29:24 AM CDT by Chelo Saenz MA)

## 2022-02-15 NOTE — PROGRESS NOTES
Patient:   PINKY PEACE            MRN: [id number]            FIN: [id number]               Age:   49 years     Sex:  Female     :  1970   Associated Diagnoses:   Bronchitis; Fatigue; Fibromyalgia   Author:   Nancy Otero MD      Visit Information      Date of Service: 10/08/2019 11:55 am  Performing Location: [facility]  Encounter#: [id number]      Primary Care Provider (PCP):  Nancy Otero MD    NPI# [id number]      Referring Provider:  Nancy Otero MD    NPI# [id number]      Chief Complaint   10/8/2019 11:56 AM CDT   Needs note so insurance will cover flight      History of Present Illness   Pinky is a 48y/o F w/ fibromyalgia who presents with ongoing cough, fatigue, weakness and ,muscle pain. She had bronchitis 3 weeks ago and never fully recovered. Still has mild productive cough, no blood. She hasn't been sleeping well with associated muscle pains including left neck, left arm, bilateral feet and hands. Hasn't been able to get back to everyday life since infection. Symptoms are very slowly improving but still noting some shortness of breath. Additionally, notes tingling down front of both legs from knee to foot. Has taken tylenol and meloxicam which helped minimally. Overall depression and anxiety doing better recently. No fever/chills, chest pain, SOB/wheezing, abdominal pain, nausea/vomiting, diarrhea/constipation.       Review of Systems   Constitutional:  Weakness, Fatigue, No fever, No chills.    Eye:  No recent visual problem, No visual disturbances.    Ear/Nose/Mouth/Throat:  No nasal congestion, No sore throat.    Respiratory:  Cough, Sputum production, No shortness of breath, No hemoptysis.    Cardiovascular:  No chest pain, No palpitations.    Gastrointestinal:  No nausea, No vomiting, No diarrhea, No constipation, No abdominal pain.    Genitourinary:  Negative.    Musculoskeletal:  Neck pain, Muscle pain, No trauma.         Back pain: Bilaterally, In the lower  region, The pain is mild.    Integumentary:  Negative.    Neurologic:  Abnormal balance, Tingling, Not alert and oriented X4, No numbness, No headache.    Psychiatric:  Negative.              Health Status   Allergies:    Allergic Reactions (Selected)  Severity Not Documented  Gabapentin (No reactions were documented)  Lexapro (Recurrent itchy hives during initial therapy with lexapro)  Nortriptyline (Had migraine at 50 mg dose)   Problem list:    All Problems  CFS (chronic fatigue syndrome) / SNOMED CT 56460517 / Confirmed  Eating disorder in remission / SNOMED CT [id number]-[id number]-11G0-018119E62O38 / Confirmed  Fibromyalgia / SNOMED CT 56007311 / Confirmed  Anxiety / SNOMED CT 17096539 / Confirmed  Stress incontinence / SNOMED CT 891023742 / Confirmed  Migraine / SNOMED CT 28968336 / Confirmed  Obesity / SNOMED CT 8959518116 / Probable  PALPITATIONS / ICD-9-.1 / Confirmed  Depression, recurrent / SNOMED CT 098619808 / Confirmed  Resolved: Abnormal Pap Smear of Cervix   Medications:  (Selected)   Prescriptions  Prescribed  clonazePAM 1 mg oral tablet: 1 tab(s) ( 1 mg ), po, bid, # 60 tab(s), 5 Refill(s), Type: Maintenance, Pharmacy: [id number], 1 tab(s) Oral bid  venlafaxine 150 mg oral tablet, extended release: 2 tab(s) ( 300 mg ), po, daily, Instructions: either capsules or tablets - whichever is most cost effective for patient, # 180 tab(s), 3 Refill(s), Type: Maintenance, Pharmacy: [id number], 2 tab(s) Oral daily,Instr:either capsules or tablet...  Documented Medications  Documented  Zantac: 0 Refill(s), Type: Maintenance  hydrOXYzine: 0 Refill(s), Type: Maintenance,    Medications          *denotes recorded medication          clonazePAM 1 mg oral tablet: 1 mg, 1 tab(s), po, bid, 60 tab(s), 5 Refill(s).          *hydrOXYzine: 0 Refill(s).          *Zantac: 0 Refill(s).          venlafaxine 150 mg oral tablet, extended release: 300 mg, 2 tab(s), po, daily, either capsules or tablets  - whichever is most cost effective for patient, 180 tab(s), 3 Refill(s).          Histories   Past Medical History:    Active  Fibromyalgia (43331709)  CFS (chronic fatigue syndrome) (66168238)  PALPITATIONS (785.1)  Comments:  4/14/2014 CDT 11:37 AM CDT - Pratibha Pastrana MD  Normal EKG and Holter  Anxiety (85738987)  Depression, recurrent (956610742)  Stress incontinence (655346567)  Migraine (64833591)  Eating disorder in remission ([id number])  Resolved  Abnormal Pap Smear of Cervix:  Resolved.   Family History:    Kidney disease  Daughter     Procedure history:    Laparoscopic cholecystectomy (27836924) on 5/26/2016 at 46 Years.  LEEP.  Tonsillectomy.  Breast reduction (653103251).  Abdominoplasty (306362201).  Vaginal delivery ([id number]).  Exploratory lap and right dermoid excision 2007.   Social History:        Alcohol Assessment            1-2 times per month, 1 drinks/episode average.      Tobacco Assessment            Former smoker, Cigarettes, 20 per day.                     Comments:                      05/17/2017 - Divine Mitchell LPN                     Quit for 6 yrs. Restarted.      Substance Abuse Assessment            Never      Home and Environment Assessment            Risks in environment: Does not wear helmet.      Nutrition and Health Assessment            Type of diet: Regular.      Exercise and Physical Activity Assessment            Exercise frequency: Never.      Sexual Assessment            Sexually active: Yes.  Sexual orientation: Heterosexual.        Physical Examination   Vital Signs   10/8/2019 11:56 AM CDT Temperature Tympanic 98.3 DegF    Peripheral Pulse Rate 80 bpm    Pulse Site Radial artery    HR Method Manual    Systolic Blood Pressure 124 mmHg    Diastolic Blood Pressure 70 mmHg    Mean Arterial Pressure 88 mmHg    BP Site Left arm    BP Method Manual      Measurements from flowsheet : Measurements   10/8/2019 11:56 AM  CDT   Ht/Wt Measurement Refused by Patient?     Yes     General:  Alert and oriented, No acute distress, Appears tired..    Eye:  Pupils are equal, round and reactive to light, Extraocular movements are intact, Normal conjunctiva.    HENT:  Normocephalic, Oral mucosa is moist, No pharyngeal erythema.    Neck:  Supple, No lymphadenopathy, Tender to palpation over L C5-7 paraspinal muscles. .    Respiratory:  Lungs are clear to auscultation, Respirations are non-labored, Breath sounds are equal, Symmetrical chest wall expansion.         Pattern: Regular.    Cardiovascular:  Normal rate, Regular rhythm, No murmur, Normal peripheral perfusion.    Gastrointestinal:  Soft, Non-tender, Non-distended.    Genitourinary:  No costovertebral angle tenderness.    Musculoskeletal:  Normal range of motion, Normal strength, No tenderness, No swelling, No deformity.    Integumentary:  Warm, Dry, No rash.    Neurologic:  Alert, Oriented, Normal sensory, Normal motor function, No focal deficits.    Psychiatric:  Cooperative, Appropriate mood & affect, Normal judgment.       Review / Management   Results review      Impression and Plan   Diagnosis     Bronchitis (KVU54-DV J41.0).     Course:  Unchanged.    Plan:  Patient still feeling run down from bronchitis a few weeks prior and unable to go on flight to [place]. We discussed how unfortunately it can take time to fully heal. We recommend not going on trip until feeling fully back to normal. Will provide a note for her..    Patient Instructions:       Counseled: Patient, Regarding diagnosis, Regarding treatment.    Diagnosis     Fatigue (BUB97-DD R53.83).     Fibromyalgia (EIP62-TT M79.7).     Course:  current exacerbation likely related to recent illness.    Plan:  Discussed continuing current treatment palna with tylenol and meloxicam and lifestyle interventions that help reduce stress and increase activity. .      Patient was seen with student Ajay Macias, MS4, and history and exam  confirmed personally by me. Agree that documentation reflects findings and plan. I completed medical decision making after discussion with student and with patient.  Nancy Otero MD

## 2022-02-15 NOTE — PROGRESS NOTES
Patient:   LUCIANA PEACE            MRN: [id number]            FIN: [id number]               Age:   48 years     Sex:  Female     :  1970   Associated Diagnoses:   Right hip pain   Author:   Axel Greer MD      Chief Complaint   10/18/2018 11:20 AM CDT  Right leg and hip pain- woke up this morning with alot of pain; no known injury     Chief complaint and symptoms noted above confirmed with patient.      History of Present Illness             The patient presents with hip pain.  The hip pain is located on the right.  The hip pain is described as aching.  The severity of the hip pain is moderate.  The hip pain is constant.  The hip pain has lasted for 1 day(s).  Exacerbating factors consist of none.  Relieving factors consist of analgesics.  Associated symptoms consist of none.        Review of Systems   Constitutional:  Negative.    Respiratory:  Negative.    Cardiovascular:  Negative.    Musculoskeletal:  Negative except as documented in history of present illness.       Health Status   Allergies:    Allergic Reactions (Selected)  Severity Not Documented  Gabapentin (No reactions were documented)  Lexapro (Recurrent itchy hives during initial therapy with lexapro)  Nortriptyline (Had migraine at 50 mg dose)   Medications:  (Selected)   Prescriptions  Prescribed  clonazePAM 1 mg oral tablet: 1 tab(s) ( 1 mg ), po, bid, # 60 tab(s), 5 Refill(s), Type: Maintenance, Pharmacy: [facility] #[id number], 1 tab(s) Oral bid  meloxicam 15 mg oral tablet: 1 tab(s) ( 15 mg ), po, daily, x 90 day(s), # 90 tab(s), 3 Refill(s), Type: Physician Stop, Pharmacy: [facility] #[id number], patient will notify pharmacy when prescription is needed, 1 tab(s) Oral daily,x90 day(s)  omeprazole 20 mg oral delayed release capsule: 1 cap(s) ( 20 mg ), po, daily, # 90 cap(s), 3 Refill(s), Type: Maintenance, Pharmacy: [facility] #[id number], patient will notify pharmacy when prescription is needed, 1 cap(s) po daily  tiZANidine 2 mg  oral tablet: 1 tab(s) ( 2 mg ), Oral, q8 hrs, # 90 tab(s), 0 Refill(s), Type: Maintenance, Pharmacy: [facility] #[id number], 1 tab(s) Oral q8 hrs  venlafaxine 150 mg oral tablet, extended release: 2 tab(s) ( 300 mg ), po, daily, Instructions: either capsules or tablets - whichever is most cost effective for patient, # 180 tab(s), 3 Refill(s), Type: Maintenance, Pharmacy: [facility] #[id number], 2 tab(s) Oral daily,Instr:either capsules or tablet...  Documented Medications  Documented  Calcium-Vitamin D: Calcium-Vitamin D, Supply, 0 Refill(s), Type: Maintenance  tiZANidine 2 mg oral tablet: 1 tab(s) ( 2 mg ), Oral, q8 hrs, 0 Refill(s), Type: Maintenance   Problem list:    All Problems  CFS (chronic fatigue syndrome) / SNOMED CT 97208519 / Confirmed  Eating disorder in remission / SNOMED CT [id number] / Confirmed  Fibromyalgia / SNOMED CT 28033868 / Confirmed  Anxiety / SNOMED CT 41338324 / Confirmed  Stress incontinence / SNOMED CT 081121355 / Confirmed  Migraine / SNOMED CT 62780149 / Confirmed  Obesity / SNOMED CT 7357294046 / Probable  PALPITATIONS / ICD-9-.1 / Confirmed  Depression, recurrent / SNOMED CT 398030244 / Confirmed      Histories   Past Medical History:    Active  Fibromyalgia (SNOMED CT 48898296)  CFS (chronic fatigue syndrome) (SNOMED CT 47894654)  PALPITATIONS (ICD-9-.1)  Comments:  4/14/2014 CDT 11:37 AM CDT - Pratibha Pastrana MD  Normal EKG and Holter  Anxiety (SNOMED CT 77679416)  Depression, recurrent (SNOMED CT 999866743)  Stress incontinence (SNOMED CT 395290632)  Migraine (SNOMED CT 74406337)  Eating disorder in remission (SNOMED CT 2R1472JQ-Z591-2H1M-61W4-494194G83R65)  Resolved  Abnormal Pap Smear of Cervix:  Resolved.   Family History:    Kidney disease  Daughter     Procedure history:    Laparoscopic cholecystectomy (SNOMED CT 35916660) performed by Dinesh Castro MD on 5/26/2016 at 46 Years.  LEEP.  Tonsillectomy.  Breast reduction (SNOMED CT  328182403).  Abdominoplasty (SNOMED CT 017023545).  [facility] [id number]).  Exploratory lap and right dermoid excision 2007.      Physical Examination   Vital Signs   10/18/2018 11:20 AM CDT Peripheral Pulse Rate 88 bpm    Pulse Site Radial artery    HR Method Manual    Systolic Blood Pressure 102 mmHg    Diastolic Blood Pressure 56 mmHg  LOW    Mean Arterial Pressure 71 mmHg    BP Site Left arm    BP Method Manual      Measurements from flowsheet : Measurements   10/18/2018 11:20 AM CDT  Ht/Wt Measurement Refused by Patient?     Yes     General:  Alert and oriented, No acute distress.    Eye:  Normal conjunctiva.    Neck:  Supple.    Respiratory:  Lungs are clear to auscultation.    Cardiovascular:  Normal rate, Regular rhythm.    Musculoskeletal:       Lower extremity exam: Hip ( Right, Tenderness ).    Integumentary:  Warm, Dry, Pink.       Impression and Plan   Diagnosis     Right hip pain (PSG38-UG M25.551).     Course:  Progressing as expected.    Orders     Orders   Pharmacy:  predniSONE 10 mg oral tablet (Prescribe): See Instructions, Instructions: 4 tabs daily for 4 days then 3 tabs daily for 4 days then 2 tabs daily for 4 days then 1 tab daily for 4 days then 1/2 tab daily for 4 days, # 42 tab(s), 0 Refill(s), Type: Maintenance, Pharmacy: [facility] #[id number], 4 tabs daily for 4 days then 3 tabs daily for 4 days then 2 tabs daily for 4 days then 1 tab daily for 4 days then 1/2 tab daily for 4 days  meloxicam 15 mg oral tablet (Suspend).     Orders     F/U  if not improving, sooner if getting worse.

## 2022-02-15 NOTE — PROGRESS NOTES
Patient:   LUCIANA PEACE            MRN: [id number]            FIN: [id number]               Age:   50 years     Sex:  Female     :  1970   Associated Diagnoses:   Acute cystitis   Author:   Nancy Otero MD      Visit Information      Date of Service: 2021 06:45 am  Performing Location: [facility]  Encounter#: [id number]      Primary Care Provider (PCP):  Nancy Otero MD    NPI# [id number]      Referring Provider:  Nancy Otero MD    NPEDI# [id number]   Participants in room during visit:  patient, self   Location of patient:  home  Location of provider:  office  Video Start Time:  719 am  Video End Time:   734 am  Video visit via [employer]    Today's visit was conducted via video conference due to the COVID-19 pandemic.  The patient's consent to proceed with a video visit has been obtained and documented.      Chief Complaint   2021 7:12 AM CST    C/o ongoing bladder concerns-Feels her bladder is full and c/o bladder spasms. Has been see in ER after  and has been to  here and put on abx but then told she had no infection. Per ER said she may have passed a kidney stone.        History of Present Illness   Patient is being evaluated via a billable video visit.  patient with symptoms starting around [place]  notes constant pressure in bladder area, right above pubic area  had ER visit in December with anterior pain that was severe, thought to be kidney stone, had CT scan that did not show kidney stone, did have hematuria  reviewed ER notes including lab results, CT results, and ultrasound results - no cause of acute findings  then had video visit, started on bactrim  did not feel like she fully improved, seen in clinic, urine sent for culture, starting on macrobid  called after 2 days with results of culture, told to stop antibiotics  symptoms have been slowly worsening again  using otc AZO which is making symptoms tolerable      Health Status   Allergies:     Allergic Reactions (Selected)  Severity Not Documented  Gabapentin (No reactions were documented)  Lexapro (Recurrent itchy hives during initial therapy with lexapro)  Nortriptyline (Had migraine at 50 mg dose)   Medications:  (Selected)   Prescriptions  Prescribed  clonazePAM 1 mg oral tablet: 1 tab(s) ( 1 mg ), po, bid, # 60 tab(s), 5 Refill(s), Type: Maintenance, Pharmacy: [facility] #[id number], 1 tab(s) Oral bid  venlafaxine 150 mg oral tablet, extended release: 2 tab(s) ( 300 mg ), po, daily, Instructions: either capsules or tablets - whichever is most cost effective for patient, # 180 tab(s), 3 Refill(s), Type: Maintenance, Pharmacy: [facility] #[id number], 2 tab(s) Oral daily,Instr:either capsules or tablet...  Documented Medications  Documented  hydrOXYzine: 0 Refill(s), Type: Maintenance   Problem list:    All Problems  Tobacco user / SNOMED CT 409020367 / Probable  Stress incontinence / SNOMED CT 184939895 / Confirmed  PALPITATIONS / ICD-9-.1 / Confirmed  Normal EKG and Holter  Obesity / SNOMED CT 6551300703 / Probable  Migraine / SNOMED CT 94703554 / Confirmed  Fibromyalgia / SNOMED CT 66340388 / Confirmed  Eating disorder in remission / SNOMED CT 6S7276NI-U626-7E3M-86Y8-710719A45Z66 / Confirmed  Depression, recurrent / SNOMED CT 715227838 / Confirmed  CFS (chronic fatigue syndrome) / SNOMED CT 63863763 / Confirmed  Anxiety / SNOMED CT 74516413 / Confirmed      Histories   Past Medical History:    Active  Fibromyalgia (SNOMED CT 08119649)  CFS (chronic fatigue syndrome) (SNOMED CT 24164901)  PALPITATIONS (ICD-9-.1)  Comments:  4/14/2014 CDT 11:37 AM CDT - Pratibha Pastrana MD  Normal EKG and Holter  Anxiety (SNOMED CT 82565281)  Depression, recurrent (SNOMED CT 499153948)  Stress incontinence (SNOMED CT 402361200)  Migraine (SNOMED CT 88625619)  Eating disorder in remission (SNOMED CT [id number]-C133-0P9C-11I6-596139A98T86)  Resolved  Abnormal Pap Smear of Cervix:  Resolved.   Family History:     Kidney disease  Daughter     Procedure history:    Laparoscopic cholecystectomy (29699079) on 5/26/2016 at 46 Years.  LEEP.  Tonsillectomy.  Breast reduction (752980711).  Abdominoplasty (589136786).  Vaginal delivery ([id number]).  Exploratory lap and right dermoid excision 2007.   Social History:        Electronic Cigarette/Vaping Assessment            Electronic Cigarette Use: Never.      Alcohol Assessment            1-2 times per month, 1 drinks/episode average.      Tobacco Assessment            10 or more cigarettes (1/2 pack or more)/day in last 30 days      Substance Abuse Assessment            Never      Home and Environment Assessment            Risks in environment: Does not wear helmet.      Nutrition and Health Assessment            Type of diet: Regular.      Exercise and Physical Activity Assessment            Exercise frequency: Never.      Sexual Assessment            Sexually active: Yes.  Sexual orientation: Heterosexual.        Physical Examination   General:  Alert and oriented, No acute distress.    Eye:  Pupils are equal, round and reactive to light, Normal conjunctiva.    HENT:  Oral mucosa is moist.    Neck:  Supple.    Respiratory:  Respirations are non-labored.    Psychiatric:  Cooperative, Appropriate mood & affect, Normal judgment.       Impression and Plan   Diagnosis     Acute cystitis (FOY03-LY N30.00).     Plan:  suspect untreated/partially treated UTI. Will check UA and urine culture. Cipro is sent to pharmacy. Should take entire course of antibiotics..    Orders     Orders (Selected)   Outpatient Orders  Ordered (In Transit)  Culture, Urine, Routine* ([facility]): Specimen Type: Urine (Clean Catch), Collection Date: 01/28/21 7:33:00 CST  Prescriptions  Prescribed  Cipro 500 mg oral tablet: = 1 tab(s) ( 500 mg ), Oral, q12 hrs, x 7 day(s), # 14 tab(s), 0 Refill(s), Type: Acute, Pharmacy: [facility] #[id number], 1 tab(s) Oral q12 hrs,x7 day(s), 199.6, lb,  01/16/21 11:13:00 CST, Weight Measured  Pyridium 200 mg oral tablet: = 1 tab(s) ( 200 mg ), Oral, tid, # 21 tab(s), 2 Refill(s), Type: Maintenance, Pharmacy: [facility] #[id number], 1 tab(s) Oral tid, 199.6, lb, 01/16/21 11:13:00 CST, Weight Measured.        Review / Management   Results review:  Lab results   1/16/2021 11:35 AM CST Urine Culture See comment   1/16/2021 11:33 AM CST UA pH 6.0    UA Specific Gravity > OR = 1.030    UA Glucose NEGATIVE    UA Bilirubin NEGATIVE    UA Ketones NEGATIVE    U Occult Blood TRACE    UA Protein NEGATIVE    UA Nitrite NEGATIVE    UA Leuk Est NEGATIVE   1/16/2021 11:28 AM CST UA Epithelial Cells Few    UA Mucous Present    UA WBC 3-5    UA RBC 0-2    UA Bacteria Moderate   .

## 2022-02-15 NOTE — LETTER
(Inserted Image. Unable to display)                                                                                                [address] [address]                                                February 12, 2021Re: LUCIANA PEACE1970  [facility]  [address] [place]  [address]Og:   [facility]The following patient has been referred to your office/practice:  LUCIANA PEACE Appointment:  Appointment is PendingPlease refer to the attached  clinical documentation for a summary of LUCIANA's care.  Please do not hesitate to contact our office if any additional clinical questions arise.  All relevant records and transition of care documents should be mailed or faxed.Your assistance in providing continuity of care is appreciated. Sincerely, [facility] [place] [place] [address](P) [phone](F) [phone]

## 2022-02-15 NOTE — TELEPHONE ENCOUNTER
"---------------------  From: Tk LEGGETTKyara (Phone Messages Pool ([id number]_[facility]))   Sent: 12/4/2020 3:41:47 PM CST  Subject: General Message     Phone Message    PCP:   NANDO BARBOSA      Time of Call:  1520       Person Calling:  Pt  Phone number:  [phone], LDM    Returned call at: 1530    Note:   Call to be Covid tested today. Informed she should schedule a virtual visit to discuss testing with a provider and cannot guaranty she will be tested today. She expressed understanding.    Last office visit and reason:  _Pt was on DW schedule for a 4 pm video visit for Covid testing.  I attempted to call pt at 336(to try and get her tested before curbside ended at 4 pm) No answer so I left message to call back.  I was rooming a different pt when she tried to call me back.  When I called her back at 359 pm pt states \"Dinorah been gettign the run around for like an hour and im sitting in the parking lot of the clinic right now trying to get tested but if I cant get tested today I dont wanna do the visit\"  I inform pt she still needs to have the video visit with a provider in order to get tested.  Pt agreeable to visit so I ask her to hold and go ask [facility] in [facility] to [facility]) and she states if she is in parking lot she will test pt but may have to wait while she is seeing her in clinic pts and she finishes [facility] visit.  VANDANA also stated since she is working urgent care tomorrow she would be willing to test pt then.  I ask again if she is in [facility] parking lot and pt then tells me \"No Im at the [facility]\" I inform pt that the [facility] is closed, so she could still have visit and informed her that [facility] did offer to test her tomorrow.  Pt then states \"why is this so hard and then said I gotta go\" and hung up on me.  I cancelled pt appoint with [facility].  Noe Castro CMA  "

## 2022-02-15 NOTE — NURSING NOTE
Comprehensive Intake Entered On:  10/8/2019 12:04 PM CDT    Performed On:  10/8/2019 11:56 AM CDT by Mary Reynolds MA               Summary   Chief Complaint :   Needs note so insurance will cover flight   Menstrual Status :   Menarcheal   Ht/Wt Measurement Refused by Patient? :   Yes   Systolic Blood Pressure :   124 mmHg   Diastolic Blood Pressure :   70 mmHg   Mean Arterial Pressure :   88 mmHg   Peripheral Pulse Rate :   80 bpm   BP Site :   Left arm   Pulse Site :   Radial artery   BP Method :   Manual   HR Method :   Manual   Temperature Tympanic :   98.3 DegF(Converted to: 36.8 DegC)    Mary Reynolds MA - 10/8/2019 11:56 AM CDT   Health Status   Allergies Verified? :   Yes   Medication History Verified? :   Yes   Immunizations Current :   Yes   Medical History Verified? :   Yes   Pre-Visit Planning Status :   Completed   Tobacco Use? :   Current every day smoker   Mary Reynolds MA - 10/8/2019 11:56 AM CDT   Consents   Consent for Immunization Exchange :   Consent Granted   Consent for Immunizations to Providers :   Consent Granted   Mary Reynolds MA - 10/8/2019 11:56 AM CDT   Meds / Allergies   (As Of: 10/8/2019 12:04:40 PM CDT)   Allergies (Active)   gabapentin  Estimated Onset Date:   Unspecified ; Created By:   Eden Paniagua CMA; Reaction Status:   Active ; Category:   Drug ; Substance:   gabapentin ; Type:   Allergy ; Updated By:   Eden Paniagua CMA; Reviewed Date:   10/8/2019 12:02 PM CDT      Lexapro  Estimated Onset Date:   Unspecified ; Reactions:   recurrent itchy hives during initial therapy with Lexapro ; Created By:   Marisela Lizama MA; Reaction Status:   Active ; Category:   Drug ; Substance:   Lexapro ; Type:   Allergy ; Updated By:   Marisela Lizama MA; Reviewed Date:   10/8/2019 12:02 PM CDT      nortriptyline  Estimated Onset Date:   Unspecified ; Reactions:   had migraine at 50 mg dose ; Created By:   Marisela Lizama MA; Reaction Status:   Active ; Category:   Drug ;  Substance:   nortriptyline ; Type:   Allergy ; Updated By:   Marisela Lizama MA; Reviewed Date:   10/8/2019 12:02 PM CDT        Medication List   (As Of: 10/8/2019 12:04:40 PM CDT)   Prescription/Discharge Order    venlafaxine  :   venlafaxine ; Status:   Prescribed ; Ordered As Mnemonic:   venlafaxine 150 mg oral tablet, extended release ; Simple Display Line:   300 mg, 2 tab(s), po, daily, either capsules or tablets - whichever is most cost effective for patient, 180 tab(s), 3 Refill(s) ; Ordering Provider:   Nancy Otero MD; Catalog Code:   venlafaxine ; Order Dt/Tm:   6/29/2018 11:21:24 AM CDT          clonazePAM  :   clonazePAM ; Status:   Prescribed ; Ordered As Mnemonic:   clonazePAM 1 mg oral tablet ; Simple Display Line:   1 mg, 1 tab(s), po, bid, 60 tab(s), 5 Refill(s) ; Ordering Provider:   Nancy Otero MD; Catalog Code:   clonazePAM ; Order Dt/Tm:   6/29/2018 11:21:11 AM CDT            Home Meds    hydrOXYzine  :   hydrOXYzine ; Status:   Documented ; Ordered As Mnemonic:   hydrOXYzine ; Simple Display Line:   0 Refill(s) ; Catalog Code:   hydrOXYzine ; Order Dt/Tm:   10/8/2019 12:02:28 PM CDT          raNITIdine  :   raNITIdine ; Status:   Documented ; Ordered As Mnemonic:   Zantac ; Simple Display Line:   0 Refill(s) ; Catalog Code:   raNITIdine ; Order Dt/Tm:   9/13/2019 9:37:06 AM CDT

## 2022-02-15 NOTE — PROGRESS NOTES
Patient Information     Name:LUCIANA PEACE      Address:      [address] [place], [place] [id number]     Sex:Female     YOB: 1970     [phone]     Emergency Contact:YINA HARRISON     MRN:[id number]     FIN:[id number]     Location:[facility]     Date of Service:01/07/2021      Primary Care Physician:       Shanna CAMARA, Nancy, [phone]      Attending Physician:       Brenda Ricci MD, [phone]  Subjective      Today's visit was conducted via telemedicine, video, from my clinic office to patient's home,  due to the COVID-19 pandemic.       Patient consent, as follows, for telemedicine visit was obtained.   You have been scheduled for a telemedicine visit, which is a billable service.  This is a replacement for a face-to-face visit that is being recommended at this time to help keep our patient safe.  If, during our visit , we decide that you need a face-to-face visit, this visit will be canceled and you will be rescheduled to come into the clinic for a face to face appointment.  Can we proceed with a telemedicine visit?   [id number]      HPI      thinks she has a uti ,+ frequency, feels like a burning sensation, no fevers or chills, started a few days ago, has had 1 previous bladder infeciton      ROS 10 point ROS is neg except as per HPI      Review of systems is negative except as per HPI including:  no fevers, chills, sore throat, runny nose, nausea, vomiting, constipation, diarrhea, rash or new skin lesions, chest pain, palpitations, slurred speech, new paresthesia, shortness of breath or wheeze.      Exam:      General: alert and oriented ×3 no acute distress.      HEENT: pupils are equal round and reactive to light extraocular motion is intact. Normocephalic and atraumatic.       Hearing is grossly normal and there is no otorrhea.       Nares are patent there is no rhinorrhea.       Mucous membranes are moist and pink.      Chest: has bilateral rise  with no increased work of breathing.      Cardiovascular: normal perfusion and brisk capillary refill.      Musculoskeletal: no gross focal abnormalities and normal gait.      Neuro: no gross focal abnormalities and memory seems intact.      Psychiatric: speech is clear and coherent and fluent. Patient dressed appropriately for the weather. Mood is appropriate and affect is full.  judgement and insight are normal, no A/V hallucinations, no S/H ideation.  thought process linear                     Discussed with patient to return to clinic if symptoms worsen or do not improve      Also discussed methods to reduce risks of Covid-19 including social isolation and avoid touching face and frequent, adequate hand washing.  If you develop upper respiratory symptoms then call the clinic or the ED to discuss whether or not you should come in for further evaluation and treatment.  Assessment/Plan       Acute cystitis (N30.00)        she should let us know if sx worsen or do not improve         Ordered:          sulfamethoxazole-trimethoprim, 1 tab(s), Oral, bid, x 7 day(s), # 14 tab(s), 0 Refill(s), Type: Acute, Pharmacy: [facility] #[id number], 1 tab(s) Oral bid,x7 day(s), (Ordered)

## 2022-02-15 NOTE — PROGRESS NOTES
Patient:   LUCIANA PEACE            MRN: [id number]            FIN: [id number]               Age:   47 years     Sex:  Female     :  1970   Associated Diagnoses:   CFS (chronic fatigue syndrome); Fibromyalgia; Anxiety; Depression, recurrent   Author:   Nancy Otero MD      Chief Complaint   2017 1:31 PM CDT    Patient here for pain med refill, and anxiety/ depression med check.      History of Present Illness   Patient presents for follow up anxiety and fibromyalgia. Here for 6 month follow up.  Doing well. Medications are effectve. Not noticing side effects.  Studying [employer] - has found supportive community. Feels like she is in a good place.          Health Status   Allergies:    Allergic Reactions (All)  Severity Not Documented  Gabapentin (No reactions were documented)  Lexapro (Recurrent itchy hives during initial therapy with lexapro)  Nortriptyline (Had migraine at 50 mg dose)   Medications:  (Selected)   Prescriptions  Prescribed  BuSpar 10 mg oral tablet: 1 tab(s) ( 10 mg ), PO, TID, # 270 tab(s), 3 Refill(s), Type: Maintenance, Pharmacy: [facility] #[id number], patient will notify pharmacy when prescription is needed, 1 tab(s) po tid  clonazePAM 1 mg oral tablet: 1 tab(s) ( 1 mg ), po, bid, # 60 tab(s), 5 Refill(s), Type: Maintenance, Pharmacy: [facility] #[id number], 1 tab(s) po bid  cyclobenzaprine 10 mg oral tablet: 1 tab(s) ( 10 mg ), po, tid, # 90 tab(s), 11 Refill(s), Type: Soft Stop, Pharmacy: [facility] #[id number], 1 tab(s) po tid  omeprazole 20 mg oral delayed release capsule: 1 cap(s) ( 20 mg ), po, daily, # 90 cap(s), 3 Refill(s), Type: Maintenance, Pharmacy: [facility] #[id number], patient will notify pharmacy when prescription is needed, 1 cap(s) po daily  traMADol 50 mg oral tablet: 1 tab(s) ( 50 mg ), PO, bid, Instructions: up to bid; okay to fill today; fill every 30 days, PRN: for pain, # 60 tab(s), 5 Refill(s), Type: Maintenance, Pharmacy: [facility] #[id number], 1  tab(s) po bid,PRN:for pain,Instr:up to bid; okay to fill today...  venlafaxine 150 mg oral tablet, extended release: 2 tab(s) ( 300 mg ), po, daily, Instructions: either capsules or tablets - whichever is most cost effective for patient, # 180 tab(s), 3 Refill(s), Type: Maintenance, Pharmacy: [facility] #[id number], 2 tab(s) po daily,Instr:either capsules or tablets...  Documented Medications  Documented  Calcium-Vitamin D: Calcium-Vitamin D, Supply, 0 Refill(s), Type: Maintenance   Problem list:    All Problems  CFS (chronic fatigue syndrome) / SNOMED CT 58214488 / Confirmed  Eating disorder in remission / SNOMED CT 0I4620PO-[id number] / Confirmed  Fibromyalgia / SNOMED CT 48502150 / Confirmed  Anxiety / SNOMED CT 83607783 / Confirmed  Stress incontinence / SNOMED CT 257214373 / Confirmed  Migraine / SNOMED CT 37873204 / Confirmed  Obesity / SNOMED CT 1373803835 / Probable  PALPITATIONS / ICD-9-.1 / Confirmed  Depression, recurrent / SNOMED CT 890748646 / Confirmed      Histories   Past Medical History:    Active  Fibromyalgia (SNOMED CT 02142623)  CFS (chronic fatigue syndrome) (SNOMED CT 08968029)  PALPITATIONS (ICD-9-.1)  Comments:  4/14/2014 CDT 11:37 AM CDT - Pratibha Pastrana MD  Normal EKG and Holter  Anxiety (SNOMED CT 96952607)  Depression, recurrent (SNOMED CT 849270746)  Stress incontinence (SNOMED CT 157422458)  Migraine (SNOMED CT 02921994)  Eating disorder in remission (SNOMED CT 0W4391KT-L003-0V8A-09P7-747684V60Z35)  Resolved  Abnormal Pap Smear of Cervix:  Resolved.   Family History:    Kidney disease  Daughter     Procedure history:    Laparoscopic cholecystectomy (32827700) on 5/26/2016 at 46 Years.  LEEP.  Tonsillectomy.  Breast reduction (418835123).  Abdominoplasty (115144724).  Vaginal delivery ([id number]).  Exploratory lap and right dermoid excision 2007.      Physical Examination   Vital Signs   8/29/2017 1:31 PM CDT Peripheral Pulse  Rate 108 bpm  HI    Systolic Blood Pressure 106 mmHg    Diastolic Blood Pressure 78 mmHg    Mean Arterial Pressure 87 mmHg    Oxygen Saturation 98 %      Measurements from flowsheet : Measurements   8/29/2017 1:31 PM CDT Height Measured - Standard 63.75 in    Weight Measured - Standard 191.2 lb    BSA 1.97 m2    Body Mass Index 33.07 kg/m2      General:  Alert and oriented, No acute distress.    Psychiatric:  Cooperative, Appropriate mood & affect, Normal judgment.       Impression and Plan   Diagnosis     CFS (chronic fatigue syndrome) (YWS67-SN R53.82).     Fibromyalgia (JAX94-QT M79.7).     Anxiety (KBH09-BR F41.1).     Depression, recurrent (QJL20-JU F33.1).     Orders     Orders (Selected)   Prescriptions  Prescribed  BuSpar 10 mg oral tablet: 1 tab(s) ( 10 mg ), PO, TID, # 270 tab(s), 3 Refill(s), Type: Maintenance, Pharmacy: [facility] #[id number], patient will notify pharmacy when prescription is needed, 1 tab(s) po tid  clonazePAM 1 mg oral tablet: 1 tab(s) ( 1 mg ), po, bid, # 60 tab(s), 5 Refill(s), Type: Maintenance, Pharmacy: [facility] #[id number], 1 tab(s) po bid  traMADol 50 mg oral tablet: 1 tab(s) ( 50 mg ), PO, bid, Instructions: up to bid; okay to fill today; fill every 30 days, PRN: for pain, # 60 tab(s), 5 Refill(s), Type: Maintenance, Pharmacy: [facility] #[id number], 1 tab(s) po bid,PRN:for pain,Instr:up to bid; okay to fill today....

## 2022-02-15 NOTE — PROGRESS NOTES
Patient:   LUCIANA PEACE            MRN: [id number]            FIN: [id number]               Age:   50 years     Sex:  Female     :  1970   Associated Diagnoses:   Abdominal pain, acute   Author:   Daniel Denson MD      Visit Information      Date of Service: 2020 02:49 pm  Performing Location: [facility]  Encounter#: [id number]      Primary Care Provider (PCP):  Nancy Otero MD    NPI# [id number]      Referring Provider:  Daniel Denson MD    NPI# [id number]   Visit type:  Video Visit via [employer] or [employer].    Participants in room during visit:  _ pt   Location of patient:  _home  Location of provider:  _ (Clinic office   Video Start Time:  _ 501  Video End Time:   _511    Today's visit was conducted via video conference due to the COVID-19 pandemic.  The patient's consent to proceed with a video visit has been obtained and documented.      Chief Complaint   2020 4:59 PM CST   abdominal pain on both sides going into back, worse on the left side.  worsened today.  hx kidney stone.  COVID ON         History of Present Illness   Patient  is being evaluated via a billable video visit. Patient calls with lower abdominal discomfort.  She notes she has had symptoms for 3 days is a bilateral lower abdominal discomfort seems to be getting worse over the course of the 3 days.  Now it seems to radiated into her left side of her back.  She has a remote history of kidney stones.  She notes that the last few hours she has been very uncomfortable has a hard time sitting still because of the discomfort.  Little change with Tylenol and ibuprofen.  No urinary frequency urgency dysuria hematuria.  She was diagnosed with Covid 6 days ago has some minor upper respiratory symptoms yet.  She had no fever no vomiting she does not think she will be able to sleep tonight.         Review of Systems   Constitutional:  Negative.    Eye:  Negative.    Ear/Nose/Mouth/Throat:  Negative.     Respiratory:  Negative.    Cardiovascular:  Negative.    Gastrointestinal:  Negative.    Genitourinary:  Negative except as documented in history of present illness.    Immunologic:  Negative.    Musculoskeletal:  Negative.    Integumentary:  Negative.    Neurologic:  Negative.    Psychiatric:  Negative.       Health Status   Allergies:    Allergic Reactions (Selected)  Severity Not Documented  Gabapentin (No reactions were documented)  Lexapro (Recurrent itchy hives during initial therapy with lexapro)  Nortriptyline (Had migraine at 50 mg dose)   Medications:  (Selected)   Prescriptions  Prescribed  clonazePAM 1 mg oral tablet: 1 tab(s) ( 1 mg ), po, bid, # 60 tab(s), 5 Refill(s), Type: Maintenance, Pharmacy: [facility] #[id number], 1 tab(s) Oral bid  venlafaxine 150 mg oral tablet, extended release: 2 tab(s) ( 300 mg ), po, daily, Instructions: either capsules or tablets - whichever is most cost effective for patient, # 180 tab(s), 3 Refill(s), Type: Maintenance, Pharmacy: [facility] #[id number], 2 tab(s) Oral daily,Instr:either capsules or tablet...  Documented Medications  Documented  hydrOXYzine: 0 Refill(s), Type: Maintenance   Problem list:    All Problems  CFS (chronic fatigue syndrome) / SNOMED CT 05769140 / Confirmed  Eating disorder in remission / SNOMED CT 7X0251XS-O167-0X1I-22Y0-477829K10A03 / Confirmed  Fibromyalgia / SNOMED CT 52373720 / Confirmed  Anxiety / SNOMED CT 42694753 / Confirmed  Stress incontinence / SNOMED CT 813082460 / Confirmed  Migraine / SNOMED CT 70202972 / Confirmed  Obesity / SNOMED CT 9145854875 / Probable  PALPITATIONS / ICD-9-.1 / Confirmed  Depression, recurrent / SNOMED CT 769017040 / Confirmed      Histories   Past Medical History:    Active  Fibromyalgia (21318189)  CFS (chronic fatigue syndrome) (55636779)  PALPITATIONS (785.1)  Comments:  4/14/2014 CDT 11:37 AM CDT - Pratibha Pastrana MD  Normal EKG and Holter  Anxiety (45458729)  Depression, recurrent  (933484430)  Stress incontinence (887312204)  Migraine (81415339)  Eating disorder in remission (4K8728VP-R686-8R2V-67X6-531739K03F12)  Resolved  Abnormal Pap Smear of Cervix:  Resolved.   Family History:    Kidney disease  Daughter     Procedure history:    Laparoscopic cholecystectomy (SNOMED CT 71183667) performed by Dinesh Castro MD on 5/26/2016 at 46 Years.  LEEP.  Tonsillectomy.  Breast reduction (SNOMED CT 285775954).  Abdominoplasty (SNOMED CT 912734483).  Vaginal delivery (SNOMED CT [id number]).  Exploratory lap and right dermoid excision 2007.   Social History:        Electronic Cigarette/Vaping Assessment            Electronic Cigarette Use: Unknown/Not obtained.      Alcohol Assessment            1-2 times per month, 1 drinks/episode average.      Tobacco Assessment            Former smoker, quit more than 30 days ago      Substance Abuse Assessment            Never      Home and Environment Assessment            Risks in environment: Does not wear helmet.      Nutrition and Health Assessment            Type of diet: Regular.      Exercise and Physical Activity Assessment            Exercise frequency: Never.      Sexual Assessment            Sexually active: Yes.  Sexual orientation: Heterosexual.        Physical Examination   General:  Alert and oriented, No acute distress.    Eye:  Pupils are equal, round and reactive to light, Normal conjunctiva.    HENT:  Oral mucosa is moist.    Neck:  Supple.    Respiratory:  Respirations are non-labored.    Psychiatric:  Cooperative, Appropriate mood & affect, Normal judgment.       Impression and Plan   Diagnosis     Abdominal pain, acute (CGS25-SX R10.9).     Plan:  Patient with lower abdominal discomfort radiating to her left back certainly concerning for possible kidney stone given the amount of discomfort she is describing she will go to the emergency room where she can get further evaluated.  .    Patient Instructions:        Counseled: Patient, Regarding diagnosis, Regarding treatment.

## 2022-02-15 NOTE — TELEPHONE ENCOUNTER
---------------------  From: Tomasa Foreman   To: Nancy Otero MD;     Sent: 3/22/2021 12:02:07 PM CDT  Subject: Scheduling Management     Patients appointment cancelled due to patients insurance not being accepted here. Appointment was for a preopPlease check with her to see if we can help with refills and give her my apologies.---------------------  From: Nancy Otero MD   To: [employer] ([id number]);     Sent: 3/22/2021 12:37:04 PM CDT  Subject: FW: Scheduling ManagementContacted pt-she said she is good on refills for now. She scheduled an appt with an [facility] provider for her pre-op and will get refills if needed at that time. I did tell her to contact us if anything is needed re: her meds and we can fill a temporary supply until she gets fully established with a new provider.

## 2022-02-15 NOTE — TELEPHONE ENCOUNTER
---------------------  From: Nancy Otero MD   To: NATA LUCIANA L    Sent: 1/28/2021 4:50:17 PM CST  Subject: urine results     Ignoring the first 9 items because they can be affected by the Azo, the lower area seems similar to the urine results from when you came to clinic previously. Culture will come in a couple of days. As we discussed, finish the antibiotics as prescribed and let me know next week how you are doing.    Grecia Otero      Results:  Date Result Name Ind Value Ref Range   1/28/2021 9:17 AM UA pH  5.0 (5.0 - 8.0)   1/28/2021 9:17 AM UA Specific Gravity  < OR = 1.005 (1.001 - 1.035)   1/28/2021 9:17 AM UA Glucose ((A)) 1+ (NEGATIVE - NEGATIVE)   1/28/2021 9:17 AM UA Bilirubin ((A)) 1+ (NEGATIVE - NEGATIVE)   1/28/2021 9:17 AM UA Ketones ((A)) TRACE (NEGATIVE - NEGATIVE)   1/28/2021 9:17 AM Urine Occult Blood  NEGATIVE (NEGATIVE - NEGATIVE)   1/28/2021 9:17 AM UA Protein ((A)) 2+ (NEGATIVE - NEGATIVE)   1/28/2021 9:17 AM UA Nitrite ((A)) POSITIVE (NEGATIVE - NEGATIVE)   1/28/2021 9:17 AM UA Leukocyte Esterase ((A)) 3+ (NEGATIVE - NEGATIVE)   1/28/2021 9:30 AM UA Epithelial Cells  Few (None - Few)   1/28/2021 9:30 AM UA Mucous  Present    1/28/2021 9:30 AM UA WBC  3-5 (None - 5)   1/28/2021 9:30 AM UA RBC  0-2 (None - 2)   1/28/2021 9:30 AM UA Bacteria  Moderate (None - Few)

## 2022-03-02 NOTE — TELEPHONE ENCOUNTER
---------------------  From: Nancy Otero MD   To: LUCIANA PEACE    Sent: 2/25/2022 7:27:28 AM CST  Subject: CT due     By our records, you are due for a follow up CT scan to recheck an abnormality that was seen in your adrenal glad. We have been unable to reach you by phone. Please call the clinic so we can help you schedule that test. I am also sending a letter by mail.    Please note that this patient portal will be turned off later today. We will be switching to Epic and you'll be able to reach me through [employer]. See [email or web address] for more information or to sign up.  This portal will be read only starting at the end of the day today- 2/25/22.    Grecia Otero

## 2022-03-02 NOTE — LETTER
(Inserted Image. Unable to display)   [address] [facility] [address]  [phone] (phone) [phone] (fax)  February 25, 2022        LUCIANA PEACE  [place] [address]      Dear LUCIANA,      Thank you for selecting [facility] for your healthcare needs.     By our records, you are due for a follow up CT scan to recheck an abnormality that was seen in your adrenal glad. We have been unable to reach you by phone. Please call the clinic so we can help you schedule that test.       Please contact me or my assistant at [phone] if you have any questions or concerns.     Sincerely,      Nancy Otero MD

## 2022-03-02 NOTE — TELEPHONE ENCOUNTER
---------------------  From: Nancy Otero MD   To: Kreyonic Pool ([id number]);     Sent: 2/8/2022 9:15:48 AM CST  Subject: CT follow up due     Please contact patient - by our records, she is due for a follow up CT scan to recheck an incidental adrenal gland finding on her CT done in the ER one year ago.  Please see if she has completed that. If not, I can place an order and fax to [facility] to complete. ThanksLM with info and asking pt to c/b.sent letter and portal message

## 2023-09-26 RX ORDER — PANTOPRAZOLE SODIUM 40 MG/1
1 TABLET, DELAYED RELEASE ORAL DAILY
Status: ON HOLD | COMMUNITY
Start: 2023-04-17 | End: 2024-06-05

## 2023-09-26 RX ORDER — BUPROPION HYDROCHLORIDE 150 MG/1
1 TABLET ORAL EVERY MORNING
Status: ON HOLD | COMMUNITY
Start: 2023-07-05 | End: 2024-06-05

## 2023-09-27 ENCOUNTER — ANESTHESIA EVENT (OUTPATIENT)
Dept: SURGERY | Facility: AMBULATORY SURGERY CENTER | Age: 53
End: 2023-09-27

## 2023-09-28 ENCOUNTER — ANESTHESIA (OUTPATIENT)
Dept: SURGERY | Facility: AMBULATORY SURGERY CENTER | Age: 53
End: 2023-09-28

## 2023-09-28 ENCOUNTER — HOSPITAL ENCOUNTER (OUTPATIENT)
Facility: AMBULATORY SURGERY CENTER | Age: 53
Discharge: HOME | End: 2023-09-28
Attending: OBSTETRICS & GYNECOLOGY
Payer: COMMERCIAL

## 2023-09-28 VITALS
DIASTOLIC BLOOD PRESSURE: 89 MMHG | RESPIRATION RATE: 16 BRPM | SYSTOLIC BLOOD PRESSURE: 152 MMHG | OXYGEN SATURATION: 98 % | BODY MASS INDEX: 37.22 KG/M2 | TEMPERATURE: 97 F | WEIGHT: 218 LBS | HEART RATE: 67 BPM | HEIGHT: 64 IN

## 2023-09-28 DIAGNOSIS — N39.46 MIXED INCONTINENCE: ICD-10-CM

## 2023-09-28 DIAGNOSIS — Z09 S/P GYNECOLOGICAL SURGERY, FOLLOW-UP EXAM: Primary | ICD-10-CM

## 2023-09-28 DEVICE — SLING, INCONTINENCE, GYNECAR TVT EXACT TVTRL: Type: IMPLANTABLE DEVICE | Site: VAGINA | Status: FUNCTIONAL

## 2023-09-28 RX ORDER — FENTANYL CITRATE 50 UG/ML
INJECTION, SOLUTION INTRAMUSCULAR; INTRAVENOUS PRN
Status: DISCONTINUED | OUTPATIENT
Start: 2023-09-28 | End: 2023-09-28

## 2023-09-28 RX ORDER — PROPOFOL 10 MG/ML
INJECTION, EMULSION INTRAVENOUS CONTINUOUS PRN
Status: DISCONTINUED | OUTPATIENT
Start: 2023-09-28 | End: 2023-09-28

## 2023-09-28 RX ORDER — LIDOCAINE 40 MG/G
CREAM TOPICAL
Status: DISCONTINUED | OUTPATIENT
Start: 2023-09-28 | End: 2023-09-29 | Stop reason: HOSPADM

## 2023-09-28 RX ORDER — FENTANYL CITRATE 0.05 MG/ML
50 INJECTION, SOLUTION INTRAMUSCULAR; INTRAVENOUS EVERY 5 MIN PRN
Status: DISCONTINUED | OUTPATIENT
Start: 2023-09-28 | End: 2023-09-28

## 2023-09-28 RX ORDER — GLYCOPYRROLATE 0.2 MG/ML
INJECTION, SOLUTION INTRAMUSCULAR; INTRAVENOUS PRN
Status: DISCONTINUED | OUTPATIENT
Start: 2023-09-28 | End: 2023-09-28

## 2023-09-28 RX ORDER — LABETALOL 20 MG/4 ML (5 MG/ML) INTRAVENOUS SYRINGE
5 EVERY 10 MIN PRN
Status: DISCONTINUED | OUTPATIENT
Start: 2023-09-28 | End: 2023-09-29 | Stop reason: HOSPADM

## 2023-09-28 RX ORDER — CEFAZOLIN SODIUM 2 G/100ML
2 INJECTION, SOLUTION INTRAVENOUS SEE ADMIN INSTRUCTIONS
Status: DISCONTINUED | OUTPATIENT
Start: 2023-09-28 | End: 2023-09-29 | Stop reason: HOSPADM

## 2023-09-28 RX ORDER — HYDROMORPHONE HCL IN WATER/PF 6 MG/30 ML
0.2 PATIENT CONTROLLED ANALGESIA SYRINGE INTRAVENOUS EVERY 5 MIN PRN
Status: DISCONTINUED | OUTPATIENT
Start: 2023-09-28 | End: 2023-09-29 | Stop reason: HOSPADM

## 2023-09-28 RX ORDER — ONDANSETRON 2 MG/ML
INJECTION INTRAMUSCULAR; INTRAVENOUS PRN
Status: DISCONTINUED | OUTPATIENT
Start: 2023-09-28 | End: 2023-09-28

## 2023-09-28 RX ORDER — OXYCODONE HYDROCHLORIDE 5 MG/1
5-10 TABLET ORAL EVERY 4 HOURS PRN
Qty: 20 TABLET | Refills: 0 | Status: SHIPPED | OUTPATIENT
Start: 2023-09-28 | End: 2024-03-06

## 2023-09-28 RX ORDER — IBUPROFEN 800 MG/1
800 TABLET, FILM COATED ORAL ONCE
Status: DISCONTINUED | OUTPATIENT
Start: 2023-09-28 | End: 2023-09-29 | Stop reason: HOSPADM

## 2023-09-28 RX ORDER — ONDANSETRON 2 MG/ML
4 INJECTION INTRAMUSCULAR; INTRAVENOUS EVERY 30 MIN PRN
Status: DISCONTINUED | OUTPATIENT
Start: 2023-09-28 | End: 2023-09-29 | Stop reason: HOSPADM

## 2023-09-28 RX ORDER — LIDOCAINE HYDROCHLORIDE 20 MG/ML
INJECTION, SOLUTION INFILTRATION; PERINEURAL PRN
Status: DISCONTINUED | OUTPATIENT
Start: 2023-09-28 | End: 2023-09-28

## 2023-09-28 RX ORDER — KETAMINE HYDROCHLORIDE 10 MG/ML
INJECTION INTRAMUSCULAR; INTRAVENOUS PRN
Status: DISCONTINUED | OUTPATIENT
Start: 2023-09-28 | End: 2023-09-28

## 2023-09-28 RX ORDER — KETOROLAC TROMETHAMINE 30 MG/ML
INJECTION, SOLUTION INTRAMUSCULAR; INTRAVENOUS PRN
Status: DISCONTINUED | OUTPATIENT
Start: 2023-09-28 | End: 2023-09-28

## 2023-09-28 RX ORDER — NEOSTIGMINE METHYLSULFATE 1 MG/ML
VIAL (ML) INJECTION PRN
Status: DISCONTINUED | OUTPATIENT
Start: 2023-09-28 | End: 2023-09-28

## 2023-09-28 RX ORDER — FENTANYL CITRATE 0.05 MG/ML
25 INJECTION, SOLUTION INTRAMUSCULAR; INTRAVENOUS
Status: DISCONTINUED | OUTPATIENT
Start: 2023-09-28 | End: 2023-09-29 | Stop reason: HOSPADM

## 2023-09-28 RX ORDER — ACETAMINOPHEN 325 MG/1
975 TABLET ORAL ONCE
Status: COMPLETED | OUTPATIENT
Start: 2023-09-28 | End: 2023-09-28

## 2023-09-28 RX ORDER — ACETAMINOPHEN 650 MG
TABLET, EXTENDED RELEASE ORAL PRN
Status: DISCONTINUED | OUTPATIENT
Start: 2023-09-28 | End: 2023-09-28 | Stop reason: HOSPADM

## 2023-09-28 RX ORDER — OXYCODONE HYDROCHLORIDE 5 MG/1
5 TABLET ORAL
Status: DISCONTINUED | OUTPATIENT
Start: 2023-09-28 | End: 2023-09-29 | Stop reason: HOSPADM

## 2023-09-28 RX ORDER — MAGNESIUM SULFATE HEPTAHYDRATE 40 MG/ML
2 INJECTION, SOLUTION INTRAVENOUS ONCE
Status: COMPLETED | OUTPATIENT
Start: 2023-09-28 | End: 2023-09-28

## 2023-09-28 RX ORDER — SCOLOPAMINE TRANSDERMAL SYSTEM 1 MG/1
1 PATCH, EXTENDED RELEASE TRANSDERMAL ONCE
Status: DISCONTINUED | OUTPATIENT
Start: 2023-09-28 | End: 2023-09-29 | Stop reason: HOSPADM

## 2023-09-28 RX ORDER — SODIUM CHLORIDE, SODIUM LACTATE, POTASSIUM CHLORIDE, CALCIUM CHLORIDE 600; 310; 30; 20 MG/100ML; MG/100ML; MG/100ML; MG/100ML
INJECTION, SOLUTION INTRAVENOUS CONTINUOUS
Status: DISCONTINUED | OUTPATIENT
Start: 2023-09-28 | End: 2023-09-29 | Stop reason: HOSPADM

## 2023-09-28 RX ORDER — OXYCODONE HYDROCHLORIDE 5 MG/1
5 TABLET ORAL
Status: COMPLETED | OUTPATIENT
Start: 2023-09-28 | End: 2023-09-28

## 2023-09-28 RX ORDER — OXYCODONE HYDROCHLORIDE 10 MG/1
10 TABLET ORAL
Status: DISCONTINUED | OUTPATIENT
Start: 2023-09-28 | End: 2023-09-29 | Stop reason: HOSPADM

## 2023-09-28 RX ORDER — ACETAMINOPHEN 325 MG/1
975 TABLET ORAL ONCE
Status: DISCONTINUED | OUTPATIENT
Start: 2023-09-28 | End: 2023-09-29 | Stop reason: HOSPADM

## 2023-09-28 RX ORDER — AMOXICILLIN 250 MG
1-2 CAPSULE ORAL 2 TIMES DAILY
Qty: 30 TABLET | Refills: 0 | Status: SHIPPED | OUTPATIENT
Start: 2023-09-28 | End: 2024-03-06

## 2023-09-28 RX ORDER — ONDANSETRON 4 MG/1
4 TABLET, ORALLY DISINTEGRATING ORAL EVERY 30 MIN PRN
Status: DISCONTINUED | OUTPATIENT
Start: 2023-09-28 | End: 2023-09-29 | Stop reason: HOSPADM

## 2023-09-28 RX ORDER — HYDROMORPHONE HCL IN WATER/PF 6 MG/30 ML
0.4 PATIENT CONTROLLED ANALGESIA SYRINGE INTRAVENOUS EVERY 5 MIN PRN
Status: DISCONTINUED | OUTPATIENT
Start: 2023-09-28 | End: 2023-09-29 | Stop reason: HOSPADM

## 2023-09-28 RX ORDER — DEXAMETHASONE SODIUM PHOSPHATE 4 MG/ML
INJECTION, SOLUTION INTRA-ARTICULAR; INTRALESIONAL; INTRAMUSCULAR; INTRAVENOUS; SOFT TISSUE PRN
Status: DISCONTINUED | OUTPATIENT
Start: 2023-09-28 | End: 2023-09-28

## 2023-09-28 RX ORDER — FENTANYL CITRATE 0.05 MG/ML
25 INJECTION, SOLUTION INTRAMUSCULAR; INTRAVENOUS EVERY 5 MIN PRN
Status: DISCONTINUED | OUTPATIENT
Start: 2023-09-28 | End: 2023-09-29 | Stop reason: HOSPADM

## 2023-09-28 RX ORDER — CEFAZOLIN SODIUM 2 G/100ML
2 INJECTION, SOLUTION INTRAVENOUS
Status: COMPLETED | OUTPATIENT
Start: 2023-09-28 | End: 2023-09-28

## 2023-09-28 RX ORDER — IBUPROFEN 800 MG/1
800 TABLET, FILM COATED ORAL EVERY 6 HOURS PRN
Qty: 30 TABLET | Refills: 0 | Status: SHIPPED | OUTPATIENT
Start: 2023-09-28 | End: 2024-03-06

## 2023-09-28 RX ORDER — PROPOFOL 10 MG/ML
INJECTION, EMULSION INTRAVENOUS PRN
Status: DISCONTINUED | OUTPATIENT
Start: 2023-09-28 | End: 2023-09-28

## 2023-09-28 RX ORDER — ONDANSETRON 4 MG/1
4 TABLET, ORALLY DISINTEGRATING ORAL EVERY 8 HOURS PRN
Qty: 4 TABLET | Refills: 0 | Status: SHIPPED | OUTPATIENT
Start: 2023-09-28 | End: 2024-03-06

## 2023-09-28 RX ADMIN — ONDANSETRON 4 MG: 2 INJECTION INTRAMUSCULAR; INTRAVENOUS at 10:25

## 2023-09-28 RX ADMIN — CEFAZOLIN SODIUM 2 G: 2 INJECTION, SOLUTION INTRAVENOUS at 09:31

## 2023-09-28 RX ADMIN — SODIUM CHLORIDE, SODIUM LACTATE, POTASSIUM CHLORIDE, CALCIUM CHLORIDE: 600; 310; 30; 20 INJECTION, SOLUTION INTRAVENOUS at 10:06

## 2023-09-28 RX ADMIN — FENTANYL CITRATE 50 MCG: 50 INJECTION, SOLUTION INTRAMUSCULAR; INTRAVENOUS at 10:32

## 2023-09-28 RX ADMIN — ACETAMINOPHEN 975 MG: 325 TABLET ORAL at 07:55

## 2023-09-28 RX ADMIN — PROPOFOL 150 MG: 10 INJECTION, EMULSION INTRAVENOUS at 09:38

## 2023-09-28 RX ADMIN — PROPOFOL 50 MG: 10 INJECTION, EMULSION INTRAVENOUS at 10:03

## 2023-09-28 RX ADMIN — ONDANSETRON 4 MG: 2 INJECTION INTRAMUSCULAR; INTRAVENOUS at 10:48

## 2023-09-28 RX ADMIN — DEXAMETHASONE SODIUM PHOSPHATE 8 MG: 4 INJECTION, SOLUTION INTRA-ARTICULAR; INTRALESIONAL; INTRAMUSCULAR; INTRAVENOUS; SOFT TISSUE at 09:39

## 2023-09-28 RX ADMIN — FENTANYL CITRATE 100 MCG: 50 INJECTION, SOLUTION INTRAMUSCULAR; INTRAVENOUS at 09:38

## 2023-09-28 RX ADMIN — OXYCODONE HYDROCHLORIDE 5 MG: 5 TABLET ORAL at 12:20

## 2023-09-28 RX ADMIN — Medication 5 MG: at 10:25

## 2023-09-28 RX ADMIN — SODIUM CHLORIDE, SODIUM LACTATE, POTASSIUM CHLORIDE, CALCIUM CHLORIDE: 600; 310; 30; 20 INJECTION, SOLUTION INTRAVENOUS at 07:54

## 2023-09-28 RX ADMIN — KETAMINE HYDROCHLORIDE 20 MG: 10 INJECTION INTRAMUSCULAR; INTRAVENOUS at 09:39

## 2023-09-28 RX ADMIN — PROPOFOL 160 MCG/KG/MIN: 10 INJECTION, EMULSION INTRAVENOUS at 09:37

## 2023-09-28 RX ADMIN — MAGNESIUM SULFATE HEPTAHYDRATE 4 G: 40 INJECTION, SOLUTION INTRAVENOUS at 08:13

## 2023-09-28 RX ADMIN — GLYCOPYRROLATE 0.8 MG: 0.2 INJECTION, SOLUTION INTRAMUSCULAR; INTRAVENOUS at 10:25

## 2023-09-28 RX ADMIN — Medication 30 MG: at 10:02

## 2023-09-28 RX ADMIN — LIDOCAINE HYDROCHLORIDE 40 MG: 20 INJECTION, SOLUTION INFILTRATION; PERINEURAL at 09:38

## 2023-09-28 RX ADMIN — FENTANYL CITRATE 25 MCG: 0.05 INJECTION, SOLUTION INTRAMUSCULAR; INTRAVENOUS at 10:58

## 2023-09-28 RX ADMIN — KETOROLAC TROMETHAMINE 15 MG: 30 INJECTION, SOLUTION INTRAMUSCULAR; INTRAVENOUS at 10:22

## 2023-09-28 ASSESSMENT — LIFESTYLE VARIABLES: TOBACCO_USE: 1

## 2023-09-28 NOTE — ANESTHESIA POSTPROCEDURE EVALUATION
Patient: Pinky Post    Procedure: Procedure(s):  TRANSVAGINAL TAPE SLING, HYSTEROSCOPY WITH DILATION AND CURETTAGE WITH ENDOMETRIAL ABLATION       Anesthesia Type:  General    Note:  Disposition: Outpatient   Postop Pain Control: Uneventful            Sign Out: Well controlled pain   PONV: No   Neuro/Psych: Uneventful            Sign Out: Acceptable/Baseline neuro status   Airway/Respiratory: Uneventful            Sign Out: Acceptable/Baseline resp. status   CV/Hemodynamics: Uneventful            Sign Out: Acceptable CV status; No obvious hypovolemia; No obvious fluid overload   Other NRE: NONE   DID A NON-ROUTINE EVENT OCCUR? No           Last vitals:  Vitals Value Taken Time   /68 09/28/23 1108   Temp 96.9  F (36.1  C) 09/28/23 1108   Pulse 70 09/28/23 1110   Resp 18 09/28/23 1108   SpO2 94 % 09/28/23 1110   Vitals shown include unvalidated device data.    Electronically Signed By: Peyton Martin MD  September 28, 2023  12:49 PM

## 2023-09-28 NOTE — ANESTHESIA PROCEDURE NOTES
Airway       Patient location during procedure: OR  Staff -        Anesthesiologist:  Peyton Martin MD       CRNA: Pooja Mercer APRN CRNA       Performed By: CRNA  Consent for Airway        Urgency: elective  Indications and Patient Condition       Indications for airway management: mike-procedural       Induction type:intravenous       Mask difficulty assessment: 0 - not attempted    Final Airway Details       Final airway type: supraglottic airway    Supraglottic Airway Details        Type: LMA       LMA size: 4    Post intubation assessment        Placement verified by: capnometry, equal breath sounds and chest rise        Number of attempts at approach: 1       Number of other approaches attempted: 0       Secured with: silk tape       Ease of procedure: easy       Dentition: Intact and Unchanged

## 2023-09-28 NOTE — OP NOTE
Procedure Date:      PREOPERATIVE DIAGNOSES:        Stress urinary incontinence.    Abnormal uterine bleeding     POSTOPERATIVE DIAGNOSES:       Same     PROCEDURES:       Pubovaginal sling  Hysteroscopic endometrial ablation with Novasure     SURGEON:  Nader Deal MD    INDICATIONS:  The patient with progressively worsening incontinence and uterine bleeding despite conservative therapy, desires surgery.     DESCRIPTION OF THE OPERATIVE PROCEDURE:  After assuring informed consent, the patient was taken to the operating room.  She was prepped and draped in the usual sterile manner.  The patient was placed and positioned by me, ensuring there was no excess flexion of the hips or the knees.  At this point a small incision was made in the suburethral area.  The vaginal mucosa and the pubocervical fascia were dissected from the bladder to the level of the pubic rami.  At this point, a  TVT needle was placed on the right angled to the left until piercing the urogenital diaphram then straightened and taken to the mons pubis previoulsy made incision while hugging to the pubic bone.   The same was done on the left.   Cystoscopy was performed showing normal bladder and ureters with no bladder injury.  The sling was then adjusted to a tension free position in the suburethral area.   At this point, the vaginal mucosa was closed.      At this point attention was directed to the ablation procedure.   The cervix was dilated and the internal os measured to be 4cm.  That cavity sounded to 9cm for a cavity length of 5cm.  A hysteroscopy revealed a normal cavity.   The novasure device was inserted.  The cavity width was noted to be 4.5cm.   a cavity assessment was performed and passed.   The ablation process was then started.   Repeat assessment with the hysteroscope shows a well ablated cavity.     ESTIMATED BLOOD LOSS:  50 mL.     COMPLICATIONS:  None.     PATHOLOGY:  None.     DRAINS:  Malloy to gravity.     DISPOSITION:  The  patient to recovery room in stable condition.     Nader Deal MD

## 2023-09-28 NOTE — DISCHARGE INSTRUCTIONS
If you have any questions or concerns regarding your procedure, please contact Dr. Deal, his office number is [phone].     You have received 975 mg of Acetaminophen (Tylenol) at 7:55. Please do not take an additional dose of Tylenol until after 1:55pm      Do not exceed 4,000 mg of acetaminophen during a 24 hour period and keep in mind that acetaminophen can also be found in many over-the-counter cold medications as well as narcotics that may be given for pain.     You received a dose of IV Toradol at 10:20AM. Please do not take any additional Ibuprofen/NSAID products (Aleve/Advil/Motrin/Naproxen/Celebrex) until after 4:20PM

## 2023-09-28 NOTE — ANESTHESIA CARE TRANSFER NOTE
Patient: Pinky Post    Procedure: Procedure(s):  TRANSVAGINAL TAPE SLING, HYSTEROSCOPY WITH DILATION AND CURETTAGE WITH ENDOMETRIAL ABLATION       Diagnosis: Mixed incontinence [N39.46]  Diagnosis Additional Information: No value filed.    Anesthesia Type:   General     Note:    Oropharynx: oropharynx clear of all foreign objects and spontaneously breathing  Level of Consciousness: drowsy  Oxygen Supplementation: face mask  Level of Supplemental Oxygen (L/min / FiO2): 6  Independent Airway: airway patency satisfactory and stable  Dentition: dentition unchanged  Vital Signs Stable: post-procedure vital signs reviewed and stable  Report to RN Given: handoff report given  Patient transferred to: PACU    Handoff Report: Identifed the Patient, Identified the Reponsible Provider, Reviewed the pertinent medical history, Discussed the surgical course, Reviewed Intra-OP anesthesia mangement and issues during anesthesia, Set expectations for post-procedure period and Allowed opportunity for questions and acknowledgement of understanding      Vitals:  Vitals Value Taken Time   /83 09/28/23 1037   Temp 97.5  F (36.4  C) 09/28/23 1037   Pulse 72 09/28/23 1037   Resp 20 09/28/23 1037   SpO2 98 % 09/28/23 1037       Electronically Signed By: CARLI Giles CRNA  September 28, 2023  10:39 AM

## 2023-09-28 NOTE — PROGRESS NOTES
Pt up to bathroom two more times prior to discharge, voiding without difficulty.  Pt had been complaining of increased abdominal pressure despite pain meds, but after voiding she felt much better.  Encouraged pt at home to try to void when having pain to see if that relieves the feeling. She was concerned with blood in the toilet but reassured her that it was within normal for the procedures she had done.  Stressed that too much bleeding is saturating a pad an hour for two or more hours as per discharge instructions, and pt and partner verbalized understanding. Pt discharged to home.

## 2023-09-28 NOTE — PROGRESS NOTES
Removed packing and ware catheter (after back flow procedure) per protocol.  Pt up to bathroom, very uncomfortable, saying she feels like she's going to urinate in the hallway.  Pt able to void without difficulty but missed the hat, so unable to measure amount.  After walking back to her room pt states she's much more comfortable, feels like she emptied her bladder.  Jadyn pad changed.  Pt resting.

## 2023-09-28 NOTE — ANESTHESIA PREPROCEDURE EVALUATION
Anesthesia Pre-Procedure Evaluation    Patient: Pinky Post   MRN: [id number] : 1970        Procedure : Procedure(s):  TRANSVAGINAL TAPE SLING, HYSTEROSCOPY, WITH DILATION AND CURETTAGE WITH ENDOMETRIAL ABLATION  HYSTEROSCOPY, WITH DILATION AND CURETTAGE WITH ENDOMETRIAL ABLATION          Past Medical History:   Diagnosis Date    Anxiety     Depression     Gastroesophageal reflux disease     PONV (postoperative nausea and vomiting)       Past Surgical History:   Procedure Laterality Date    ABDOMINOPLASTY      BREAST SURGERY      CHOLECYSTECTOMY      CYSTOURETHROSCOPY N/A 3/30/2021    Procedure: CYSTOSCOPY WITH BLADDER HYDRODYSTENSION, , PUDENDAL NERVE BLOCK;  Surgeon: Sheila Lewis MD;  Location: [facility];  Service: Gynecology    OVARIAN CYST REMOVAL      TONSILLECTOMY        Allergies   Allergen Reactions    Escitalopram Hives and Itching    Gabapentin Other (See Comments)    Nortriptyline Other (See Comments)     Migraine at 50 mg dose      Social History     Tobacco Use    Smoking status: Every Day     Types: Cigarettes    Smokeless tobacco: Never   Substance Use Topics    Alcohol use: Yes      Wt Readings from Last 1 Encounters:   23 98.9 kg (218 lb)        Anesthesia Evaluation   Pt has had prior anesthetic.     History of anesthetic complications  - PONV.      ROS/MED HX  ENT/Pulmonary:     (+)     KENNETH risk factors,           tobacco use, Current use,                      Neurologic:  - neg neurologic ROS     Cardiovascular:  - neg cardiovascular ROS  (-) GABRIEL   METS/Exercise Tolerance: >4 METS    Hematologic:  - neg hematologic  ROS     Musculoskeletal:  - neg musculoskeletal ROS     GI/Hepatic:     (+) GERD,                   Renal/Genitourinary:  - neg Renal ROS     Endo:     (+)               Obesity,       Psychiatric/Substance Use:     (+) psychiatric history anxiety and depression       Infectious Disease:  - neg infectious disease ROS     Malignancy:        Other:            Physical Exam    Airway        Mallampati: III   TM distance: > 3 FB   Neck ROM: full     Respiratory Devices and Support         Dental       (+) Minor Abnormalities - some fillings, tiny chips      Cardiovascular          Rhythm and rate: regular     Pulmonary           breath sounds clear to auscultation           OUTSIDE LABS:  CBC: No results found for: WBC, HGB, HCT, PLT  BMP:   Lab Results   Component Value Date    CR 0.70 05/23/2016    CR 0.72 07/10/2015     (H) 05/23/2016    GLC 83 07/10/2015     COAGS: No results found for: PTT, INR, FIBR  POC: No results found for: BGM, HCG, HCGS  HEPATIC: No results found for: ALBUMIN, PROTTOTAL, ALT, AST, GGT, ALKPHOS, BILITOTAL, BILIDIRECT, FARRAH  OTHER: No results found for: PH, LACT, A1C, CIERA, PHOS, MAG, LIPASE, AMYLASE, TSH, T4, T3, CRP, SED    Anesthesia Plan    ASA Status:  2    NPO Status:  NPO Appropriate    Anesthesia Type: General.     - Airway: LMA   Induction: Intravenous, Propofol.   Maintenance: TIVA.        Consents    Anesthesia Plan(s) and associated risks, benefits, and realistic alternatives discussed. Questions answered and patient/representative(s) expressed understanding.     - Discussed:     - Discussed with:  Patient            Postoperative Care    Pain management: Multi-modal analgesia, IV analgesics.   PONV prophylaxis: Dexamethasone or Solumedrol, Ondansetron (or other 5HT-3)     Comments:    Other Comments:     Ketamine  Toradol if ok with surgeon  Mag gtt  Precedex boluses                Peyton Martin MD

## 2024-01-02 NOTE — PATIENT INSTRUCTIONS
Welcome to [facility]!      We are grateful that you have chosen us to partner with you on your journey to better health!  We have included some very important information for you to read as you begin your journey towards weight loss surgery. We will discuss parts of this as you move closer to surgery but please reach out if you have any questions or concerns.      Please click on the following links and they will lead you to a printable version of each handout or copy into your browser to view/print at home:    Making Your Decision, Understanding Weight Loss Surgery  [email or web address]    A Roadmap to Your Weight Loss Surgery  [email or web address]    Honoring Choices - Your Rights  [email or web address]    Honoring Choices - [place] Health Care Directive (form that can be filled out)  [email or web address]      Insurance Coverage and Approval for Surgery  Every insurance plan is different.  Many companies approve benefits for surgery, but many have specific requirements that must be completed prior to being approved.    Verification of benefits is the patient's responsibility.  You will be responsible for any charges not covered by your insurance plan - including, but not limited to copays, deductible, out of pocket, any amount over your cap limit, etc.  Using the guideline below, please contact your insurance plan (we recommend you call the Customer Service number on the back of your card).      Once all of the requirements for surgery are complete, you will see the surgeon.  Following this visit, we will submit your information to your insurance plan for Prior Authorization.  We strongly encourage you to submit any documentation that supports your weight loss attempts to us.    Questions that are important to ask your insurance company when you call them:    Are [facility] in my network?  Is bariatric surgery  a covered benefit under my policy?  If so, what is my estimated out of pocket expense?  Are there any exclusions or cap limits on my plan for bariatric surgery?  If so, what are they?  What are the criteria necessary to be approved for bariatric surgery?  Do you require medically supervised weight loss visits for approval of surgery?  If yes, for how many months?  Within the last # of years?  Are the following procedures a covered benefit under my plan?  Laparoscopic Gastric Bypass (CPT Code 40594)  Laparoscopic Sleeve Gastrectomy (CPT Code 32136)  Nutrition/Dietitian Consult (CPT Code 24329  Nutrition/Dietitian Reassessment (CPT Code 73376  Psychological Assessment (CPT Codes 75397 & 45540      Initial labs for Bariatric Surgery    Some lab orders were placed by your doctor in the [facility] system and can be drawn at any of our outpatient hospital labs or your clinic. If you do them at one of three hospitals ([facility] in [place], [facility] in [place], [facility] in [place]) you do not need an appointment but if you'd like to do them at a clinic, you will need to schedule an appointment with that clinic.       You will need to be fasting 10-12 hours prior (you can continue to drink water) and should have them drawn sooner verses later so if any corrections need to be made we will have time to address them.      [facility] lab is open 7 am - 4:30 pm Monday through Friday and 9am-12:30 pm on Saturdays.  [facility]'s lab is open 7 am -4:30 pm Monday through Friday and 8am to 11:30am on Saturday and Sundays.     If you would like them done at a clinic outside the [facility] system, then we will need to know where to fax the orders.   If you have any questions or would like help scheduling a lab appointment at the [facility], please give us a call on the Bariatric Nurse Line at [phone].        [place] [facility] for Bariatric Surgery  Eat 3 nutrient-rich meals each day      Don't skip meals--it will cause you to overeat later in the day! Space meals 4-6 hours apart    Eat around the same times each day to develop a routine eating schedule    Avoid snacking unless physically hungry.   Planned snacks: 1-2 times per day and no more than 150 calories    Eating protein and fiber (vegetables/fruits/whole grains) with meals helps you stay full     Choose foods with less than 10 grams of sugar and 5-10 grams of fat per serving to prevent excess calories and weight gain  Eat protein first    Protein helps with healing, maintaining muscle mass and good nutrition, and reducing hunger     For RNY Gastric Bypass, Sleeve Gastrectomy, and Duodenal Switch: aim for 60-80 grams of protein per day    Eat protein first, then veggies and the fruits or grains  Stop drinking 15-30 minutes before meals and wait 30-45 minutes after eating to drink    Drinking too soon before a meal may cause you to be too full to eat    Drinking too soon after you eat will cause the food to  wash  through your new stomach too quickly--leaving you hungry and likely to eat more    Eat S-[id number]-W-L-Y    Take 20-30 minutes to eat each meal by taking small bites, chewing foods to applesauce consistency or 20-30 times before you swallow    Not chewing food properly can block the opening of your pouch--causing pain, nausea, vomiting    Eating foods too fast can delay those full signals and increase overeating   Slow down your eating by smaller plates/bowls, toddler utensils, putting your fork/spoon down between bites and not watching TV/computer during meals!  Make a list of activities to prevent boredom, stress and emotional eating    Go for a walk or lift weights while watching your favorite TV show    Talk to a co-worker or email/call a friend     Keep your hands and mind busy with woodworking, puzzles, knitting or painting your nails    Practice deep breathing or meditation  Drink 64 ounces of 0-Calorie drinks between meals      Water    Zero calorie Propel , Vitamin Water Zero  or [employer] , Crystal Light , Sugar-Free Nir-Aid , and other sugar-free lemonade or flavored quintero    Decaffeinated, unsweetened coffee/ tea (1 cup or less per day)   Avoid Caffeine and Carbonation- NO STRAWS    Caffeine can irritate your new pouch, increase risk for ulcers, and can increase your appetite      Carbonation can lead to increased bloating/gas and discomfort   Work up to a total of 30-60 minutes of physical activity most days of the week    Helps with losing weight and preventing weight regain after surgery     Do a combination of cardio (walking/water exercises/biking/swimming) and strength training  Take daily vitamin/mineral supplements after surgery    Daily use of vitamins/minerals is necessary for the rest of your life      Psychological Evaluation for Bariatric Surgery      Why do I need a psychological evaluation before bariatric surgery?     The psychologist's main purpose is to provide the support and education to ensure you have the most successful outcome after surgery.   Preparing for bariatric surgery often involves changing behavior patterns. Working on these changes before surgery allows you to achieve the best results after surgery as some of these behaviors have been entrenched for many years. In addition, your relationship with food may need to change. Psychologists are experts in behavior change and are there to guide and support you as you make these important changes.   A significant life change, like losing a lot of weight, may affect many areas of your life--self-concept, physical activity and relationships with others. Your psychologist will help you prepare to adjust to these changes in a healthy way.   If you have mental health symptoms, relationship struggles, or other challenges, your psychologist will suggest how to best address these concerns so that they do not interfere with your progress toward a healthier  lifestyle.       Is the psychologist looking for reasons to say I can't have surgery?   The goal is to not find specific psychological problems. Instead, the psychologist will be working with your strengths to ensure you have the most optimal outcome after surgery.  There is no expectation that you will have everything figured out already or that you must have  perfect  behaviors before moving forward with surgery. Your psychologist is expecting that you will have some behavior changes that will need to occur concurrent with the surgery.   You can feel comfortable that the psychologist is not there to    you or your habits. The psychologist is there to provide support and encourage your progress.      What should I expect during the evaluation?   During the interview, which could take place over 2 or more sessions, the psychologist will ask about:   -weight history, current eating pattern and fluid intake, and previous attempts at weight loss   -activity level   -psychiatric history  -drug, alcohol and nicotine use   -social and developmental history  -support system   -current stressors and coping mechanisms   -understanding of the risks of surgery   -expectations for outcomes after surgery   You will complete various questionnaires that will focus on coping strategies, eating behaviors, quality of life and adverse childhood experiences in order to provide additional information to inform the assessment.   Your psychologist will likely give you some  homework assignments  to practice as you prepare for surgery. This will be individually tailored to your specific needs.   Your psychologist will talk with you about some of the typical challenges that patients might encounter after surgery and how to prepare for these.   A final report will be generated and any treatment recommendations will be discussed with you and the bariatric team to determine next steps.   If you would like, you may have any support people  (e.g., spouse) join a session of the evaluation to provide additional information.       Bariatric surgery offers a physical  tool  for weight management. Similarly, your work with the psychologist will provide you with some additional emotional and behavioral  tools  as you work toward your healthier lifestyle goals.      Support Group    Support and accountability is an important part of your weight loss journey and maintenance once you reach your health goals.  Because of this, we require that you attend at least one of our support groups before you meet with the surgeon so you get a feel for what they are like and hopefully establish a connection to others who are going through a similar process or have had surgery before so you can ask your questions.    We currently have two options for support group but they are in the virtual format only at this time.  Both groups are using Microsoft Teams for their platform and you can access it through the web or an javi that can be downloaded to a smart phone if you have one.      The Pre- and Post-op Connections Group is on the third Tuesday of the month from 6:30-8pm and is hosted by Zeb Arriaga, PhD.  If you are interested in this group, you will need to email him at [email or web address] each month and then he will in turn send you the invitation to join.      We also have a Post-op focused Connections Group the 4th Wednesday of the month from 11am-12pm that is mostly geared toward post-operative patients who are past three months post-op.  This group is hosted by ELIZABETH Chen, CBN, CIC and you can email her to join the group at [email or web address] each month and she will send you an invite similar to Zeb's group.  If you decide you would like to be a regular attender at this group, we can add you to an automatic invitation list of people.    You can see more information about available support groups that the [facility] offers to our patients  as well by following the link:    The following Support Group information can also be found on our website:  [email or web address]      Please let us know if you have any questions about all the information above and we will be talking more about this during future visits.     Thank you,   [facility] Bariatric Team    Bariatric Task List    Fax:  Please fax all paperwork to: [phone] -     Status:  Is patient a candidate for bariatric surgery?:  patient is a candidate for bariatric surgery -     Cleared to schedule surgeon consult?:  not cleared to schedule surgeon consult -     Status:  surgery evaluation in process -     Surgeon: Isamar -        Insurance: Insurance:  Medicare with  sign over plan to [employer] -      Advanced beneficiary notification (ABN) for Medicare patients for RD visits   and surgery: Completed -     Other:    -        Patient Info: Initial Weight:  231.5lb with BMI 40.37 -     Date of Initial Weight/Height:  1/24/2024 -     Required Weight Loss:  no weight gain -     Surgery Type:  gastric bypass -     Multidisciplinary Meeting:    -        Dietician Visits: Structured weight loss required by insurance?:  no structured weight loss required -     Clearance from dietician to see surgeon?:  Needed -     Dietician Notes:    -        Psychological Evaluation: Psych eval:  Needed -     Other:    -        Lab Work: Complete Blood Count:  Completed -     Comprehensive Metabolic Panel:  Completed -     Vitamin D:  Completed -     PTH:  Completed -     Hgb A1c:  Completed - 4/17/2023   5.4    Lipids: Completed -      TSH ([facility], [facility], [place]): Completed -       Ferritin: Completed -       Folate: Completed -     Thiamine: Completed -     Vitamin A: Completed -     Vitamin B12: Completed -     Zinc: Completed -        PCP: Establish care with PCP:  Completed -        Health Maintenance: Colonoscopy(> 50 yrs or family hx):  NOT  "Needed -     Mammogram (> 40 yrs or family hx):  Needed - 4/17/2023 due in April 2024   Pap Smear (women): Completed - 4/17/2023      Stopping Smoking/ Alcohol Use/Cannabis Use: Quit tobacco use (3 months smoke free)?:  Needed - Tobacco cessation 1/19/2024. Will check tobacco metabolites. Enrolled in [employer] for support.   Quit date:  1/19/2024 -        Patient Education:  Information Session:  Completed -     Given \"Making your decision\" handout?:  Yes -     Given \"A Roadmap to you Weight Loss Surgery\" handout?: Yes -     Given \"Epic Care Companion\" information?: No -     Attended support group?:  Needed - must attend at least once prior to surgery      Additional Surgery Requirements: Review Coag plan:  Completed - standard   Birth control plan:    - ablation and partner vasectomy   Gallstone prevention plan (Actigall for 6 months postop): Completed - gloria hx   Other:   -        Final Tasks:  Before surgery online preop class:  Needed -     After surgery online class:  Needed -     History and Physical: Primary Care Provider -   Needed -     Final labs per clinic: Needed -     Chest xray per clinic: Needed -     Electrocardiogram (ECG) per clinic: Needed -     Schedule postop appointments: Needed -     Other:   -   -        Notes: Final tasks will be ordered and scheduled once surgery is scheduled   -            "

## 2024-01-15 ENCOUNTER — MEDICAL CORRESPONDENCE (OUTPATIENT)
Dept: HEALTH INFORMATION MANAGEMENT | Facility: CLINIC | Age: 54
End: 2024-01-15
Payer: COMMERCIAL

## 2024-01-23 ENCOUNTER — TRANSCRIBE ORDERS (OUTPATIENT)
Dept: OTHER | Age: 54
End: 2024-01-23

## 2024-01-23 DIAGNOSIS — E66.812 CLASS 2 SEVERE OBESITY WITH BODY MASS INDEX (BMI) OF 35 TO 39.9 WITH SERIOUS COMORBIDITY (H): Primary | ICD-10-CM

## 2024-01-23 DIAGNOSIS — E66.01 CLASS 2 SEVERE OBESITY WITH BODY MASS INDEX (BMI) OF 35 TO 39.9 WITH SERIOUS COMORBIDITY (H): Primary | ICD-10-CM

## 2024-01-23 ASSESSMENT — SLEEP AND FATIGUE QUESTIONNAIRES
HOW LIKELY ARE YOU TO NOD OFF OR FALL ASLEEP WHILE SITTING QUIETLY AFTER LUNCH WITHOUT ALCOHOL: WOULD NEVER DOZE
HOW LIKELY ARE YOU TO NOD OFF OR FALL ASLEEP WHILE SITTING AND READING: SLIGHT CHANCE OF DOZING
HOW LIKELY ARE YOU TO NOD OFF OR FALL ASLEEP WHEN YOU ARE A PASSENGER IN A CAR FOR AN HOUR WITHOUT A BREAK: HIGH CHANCE OF DOZING
HOW LIKELY ARE YOU TO NOD OFF OR FALL ASLEEP WHILE WATCHING TV: MODERATE CHANCE OF DOZING
HOW LIKELY ARE YOU TO NOD OFF OR FALL ASLEEP IN A CAR, WHILE STOPPED FOR A FEW MINUTES IN TRAFFIC: WOULD NEVER DOZE
HOW LIKELY ARE YOU TO NOD OFF OR FALL ASLEEP WHILE SITTING INACTIVE IN A PUBLIC PLACE: WOULD NEVER DOZE
HOW LIKELY ARE YOU TO NOD OFF OR FALL ASLEEP WHILE LYING DOWN TO REST IN THE AFTERNOON WHEN CIRCUMSTANCES PERMIT: HIGH CHANCE OF DOZING
HOW LIKELY ARE YOU TO NOD OFF OR FALL ASLEEP WHILE SITTING AND TALKING TO SOMEONE: WOULD NEVER DOZE

## 2024-01-24 ENCOUNTER — LAB (OUTPATIENT)
Dept: LAB | Facility: CLINIC | Age: 54
End: 2024-01-24
Payer: COMMERCIAL

## 2024-01-24 ENCOUNTER — OFFICE VISIT (OUTPATIENT)
Dept: SURGERY | Facility: CLINIC | Age: 54
End: 2024-01-24
Payer: COMMERCIAL

## 2024-01-24 VITALS
DIASTOLIC BLOOD PRESSURE: 84 MMHG | SYSTOLIC BLOOD PRESSURE: 132 MMHG | BODY MASS INDEX: 39.52 KG/M2 | HEIGHT: 64 IN | WEIGHT: 231.5 LBS

## 2024-01-24 DIAGNOSIS — E66.01 CLASS 2 SEVERE OBESITY WITH BODY MASS INDEX (BMI) OF 35 TO 39.9 WITH SERIOUS COMORBIDITY (H): ICD-10-CM

## 2024-01-24 DIAGNOSIS — E66.01 SEVERE OBESITY (BMI >= 40) (H): Primary | ICD-10-CM

## 2024-01-24 DIAGNOSIS — M79.7 FIBROMYALGIA: ICD-10-CM

## 2024-01-24 DIAGNOSIS — R53.83 OTHER FATIGUE: ICD-10-CM

## 2024-01-24 DIAGNOSIS — E78.5 DYSLIPIDEMIA: ICD-10-CM

## 2024-01-24 DIAGNOSIS — R63.30 FEEDING DIFFICULTIES, UNSPECIFIED: ICD-10-CM

## 2024-01-24 DIAGNOSIS — E66.812 CLASS 2 SEVERE OBESITY WITH BODY MASS INDEX (BMI) OF 35 TO 39.9 WITH SERIOUS COMORBIDITY (H): ICD-10-CM

## 2024-01-24 DIAGNOSIS — F41.9 ANXIETY DISORDER, UNSPECIFIED TYPE: ICD-10-CM

## 2024-01-24 DIAGNOSIS — R79.9 ABNORMAL FINDING OF BLOOD CHEMISTRY, UNSPECIFIED: ICD-10-CM

## 2024-01-24 DIAGNOSIS — M25.50 MULTIPLE JOINT PAIN: ICD-10-CM

## 2024-01-24 LAB
ERYTHROCYTE [DISTWIDTH] IN BLOOD BY AUTOMATED COUNT: 14.5 % (ref 10–15)
HBA1C MFR BLD: 5.4 % (ref 0–5.6)
HCT VFR BLD AUTO: 39.9 % (ref 35–47)
HGB BLD-MCNC: 12.8 G/DL (ref 11.7–15.7)
MCH RBC QN AUTO: 29.6 PG (ref 26.5–33)
MCHC RBC AUTO-ENTMCNC: 32.1 G/DL (ref 31.5–36.5)
MCV RBC AUTO: 92 FL (ref 78–100)
PLATELET # BLD AUTO: 265 10E3/UL (ref 150–450)
RBC # BLD AUTO: 4.33 10E6/UL (ref 3.8–5.2)
WBC # BLD AUTO: 9.1 10E3/UL (ref 4–11)

## 2024-01-24 PROCEDURE — 83970 ASSAY OF PARATHORMONE: CPT

## 2024-01-24 PROCEDURE — 82306 VITAMIN D 25 HYDROXY: CPT

## 2024-01-24 PROCEDURE — 82728 ASSAY OF FERRITIN: CPT

## 2024-01-24 PROCEDURE — 84630 ASSAY OF ZINC: CPT | Mod: 90

## 2024-01-24 PROCEDURE — 99205 OFFICE O/P NEW HI 60 MIN: CPT | Performed by: FAMILY MEDICINE

## 2024-01-24 PROCEDURE — 84425 ASSAY OF VITAMIN B-1: CPT | Mod: 90

## 2024-01-24 PROCEDURE — 84590 ASSAY OF VITAMIN A: CPT | Mod: 90

## 2024-01-24 PROCEDURE — 80053 COMPREHEN METABOLIC PANEL: CPT

## 2024-01-24 PROCEDURE — 82746 ASSAY OF FOLIC ACID SERUM: CPT

## 2024-01-24 PROCEDURE — 36415 COLL VENOUS BLD VENIPUNCTURE: CPT

## 2024-01-24 PROCEDURE — 84443 ASSAY THYROID STIM HORMONE: CPT

## 2024-01-24 PROCEDURE — 85027 COMPLETE CBC AUTOMATED: CPT

## 2024-01-24 PROCEDURE — 99000 SPECIMEN HANDLING OFFICE-LAB: CPT

## 2024-01-24 PROCEDURE — 83036 HEMOGLOBIN GLYCOSYLATED A1C: CPT

## 2024-01-24 PROCEDURE — 82607 VITAMIN B-12: CPT

## 2024-01-24 NOTE — Clinical Note
Pinky has BMI 40 and hyperlipidemia. She has GERD and fibromyalgia. Can she lose weight and still qualify if she is <BMI 40 at Dr. Bunn's visit?

## 2024-01-24 NOTE — LETTER
"    2024         RE: Pinky Post  [address]  [facility] [address]        Dear Colleague,    Thank you for referring your patient, Pinky Post, to the [facility] [place]. Please see a copy of my visit note below.    New Bariatric Surgery Consultation Note    2024    RE: Pinky Post  MR#: [id number]  : 1970      Referring provider:       2024     6:58 PM   --   Who referred you Pratibha Pastrana/ primary physician       Chief Complaint/Reason for visit: evaluation for possible weight loss surgery    Dear Dr. Pastarna,    I had the pleasure of seeing your patient, Pinky Post, to evaluate her obesity and consider her for possible weight loss surgery. As you know, Pinky Post is 53 year old.  She has a height of 5' 3.5\", a weight of 231 lbs 8 oz, and calculated Body mass index is 40.37 kg/m .        HISTORY OF PRESENT ILLNESS:      2024     6:58 PM   Weight Loss History Reviewed with Patient   How long have you been overweight? Since early childhood   What is the most that you have ever weighed 236   What is the most weight you have lost? 60   I have tried the following methods to lose weight Watching portions or calories    Exercise    Weight Watchers    Atkins type diet (low carb/high protein)    Yadira Driver    Pre packaged meals ex: [employer]    Slfast    Physician directed program   I have tried the following weight loss medications? (Check all that apply) None       CO-MORBIDITIES OF OBESITY INCLUDE:      2024     6:58 PM   --   I have the following health issues associated with obesity High Blood Pressure    GERD (Reflux)    Stress Incontinence       PAST MEDICAL HISTORY:  Past Medical History:   Diagnosis Date     Anxiety      Depression      Dyslipidemia      Fatigue      Fibromyalgia 2024     Gastroesophageal reflux disease      Morbid obesity (H)      Morbid obesity (H) 2024     " Multiple joint pain      PONV (postoperative nausea and vomiting)      Tobacco use disorder        PAST SURGICAL HISTORY:  Past Surgical History:   Procedure Laterality Date     ABDOMINOPLASTY       BREAST SURGERY       CHOLECYSTECTOMY       CREATION, VAGINAL WALL SLING N/A 9/28/2023    Procedure: TRANSVAGINAL TAPE SLING, HYSTEROSCOPY WITH DILATION AND CURETTAGE WITH ENDOMETRIAL ABLATION;  Surgeon: Nader Deal MD;  Location: formerly [facility]     CYSTOURETHROSCOPY N/A 3/30/2021    Procedure: CYSTOSCOPY WITH BLADDER HYDRODYSTENSION, , PUDENDAL NERVE BLOCK;  Surgeon: Sheila Lewis MD;  Location: formerly [facility];  Service: Gynecology     OVARIAN CYST REMOVAL       TONSILLECTOMY         FAMILY HISTORY:   No family history on file.    SOCIAL HISTORY:       1/23/2024     6:58 PM   Social History Questions Reviewed With Patient   Which best describes your employment status (select all that apply) I am disabled   Which best describes your marital status    Who do you have in your support network that can be available to help you for the first 2 weeks after surgery? 2 adult daughters, fiance, cousin, aunt, friends   Who can you count on for support throughout your weight loss surgery journey? Daughters, fiance, other family, and friends       HABITS:      1/23/2024     6:58 PM   --   How often do you drink alcohol? 2-4 times a month   If you do drink alcohol, how many drinks might you have in a day? (one drink = 5 oz. wine, 1 can/bottle of beer, 1 shot liquor) 1 or 2   Do you currently use any of the following Nicotine products? No   Have you ever used any of the following nicotine products? Cigarettes   If you previously used any of these products, what year did you quit? 2024   Have you or are you currently using street drugs or prescription strength medication for which you do not have a prescription for? No   Do you have a history of chemical dependency (alcohol or drug abuse)? No        PSYCHOLOGICAL HISTORY:       1/23/2024     6:58 PM   Psychological History Reviewed With Patient   Have you ever attempted suicide? Never.   Have you had thoughts of suicide in the past year? No   Have you ever been hospitalized for mental illness or a suicide attempt? In the last 5 to 10 years.   Do you have a history of chronic pain? Yes   Have you ever been diagnosed with fibromyalgia? Yes   Are you currently being treated for any of the following? (select all that apply) Depression    Anxiety   Are you currently seeing a therapist or counselor? Yes   Are you currently seeing a psychiatrist? Yes       ROS:      1/23/2024     6:58 PM   --   Skin Leg swelling   HEENT Headaches    Dizziness/lightheadedness   Musculoskeletal Joint Pain    Back pain    Limited mobility    Swelling of legs    Arthritis   Cardiovascular Shortness of breath with activity   Pulmonary Shortness of breath with activity    Snoring    Awaken from sleep to catch your breath    Experience morning headaches   Gastrointestinal Heartburn    Reflux   Genitourinary Stress incontinence (losing urine when coughing, sneezing, etc.)   Hematological None of the above   Neurological Migraine headaches   Female only None of the above       EATING BEHAVIORS:      1/23/2024     6:58 PM   --   Have you or anyone else thought that you had an eating disorder? No   Do you currently binge eat (eat a large amount of food in a short time)? No   Are you an emotional eater? Yes   Do you get up to eat after falling asleep? No   Can you afford 3 meals a day? Yes   Can you afford 50-60 dollars a month for vitamins? Yes       EXERCISE:      1/23/2024     6:58 PM   --   How often do you exercise? 1 to 2 times per week   What is the duration of your exercise (in minutes)? 30 Minutes   What types of exercise do you do? other   What keeps you from being more active? Pain    My ability to walk or move around is limited    Shortness of breath    Too tired        MEDICATIONS:  Current Outpatient Medications   Medication Sig Dispense Refill     acetaminophen (TYLENOL) 500 MG tablet [ACETAMINOPHEN (TYLENOL) 500 MG TABLET] Take 1,000 mg by mouth every 6 (six) hours as needed for pain.       amitriptyline (ELAVIL) 10 MG tablet [AMITRIPTYLINE (ELAVIL) 10 MG TABLET] Take 10 mg by mouth.       buPROPion (WELLBUTRIN XL) 150 MG 24 hr tablet Take 1 tablet by mouth every morning       clonazePAM (KLONOPIN) 1 MG tablet [CLONAZEPAM (KLONOPIN) 1 MG TABLET] Take 2 mg by mouth.       fluticasone propionate (FLONASE) 50 mcg/actuation nasal spray [FLUTICASONE PROPIONATE (FLONASE) 50 MCG/ACTUATION NASAL SPRAY] 2 sprays into each nostril.       hydrOXYzine HCL (ATARAX) 25 MG tablet [HYDROXYZINE HCL (ATARAX) 25 MG TABLET] Take 25 mg by mouth.       ibuprofen (ADVIL,MOTRIN) 600 MG tablet [IBUPROFEN (ADVIL,MOTRIN) 600 MG TABLET] Take 1 tablet (600 mg total) by mouth every 6 (six) hours as needed for pain. 30 tablet 0     ibuprofen (ADVIL/MOTRIN) 800 MG tablet Take 1 tablet (800 mg) by mouth every 6 hours as needed for other (mild and/or inflammatory pain) 30 tablet 0     pantoprazole (PROTONIX) 40 MG EC tablet Take 1 tablet by mouth daily       senna-docusate (SENOKOT-S/PERICOLACE) 8.6-50 MG tablet Take 1-2 tablets by mouth 2 times daily 30 tablet 0     venlafaxine (EFFEXOR-XR) 150 MG 24 hr capsule [VENLAFAXINE (EFFEXOR-XR) 150 MG 24 HR CAPSULE] Take by mouth.       ondansetron (ZOFRAN ODT) 4 MG ODT tab Take 1 tablet (4 mg) by mouth every 8 hours as needed for nausea (Patient not taking: Reported on 1/24/2024) 4 tablet 0     oxyCODONE (ROXICODONE) 5 MG tablet Take 1-2 tablets (5-10 mg) by mouth every 4 hours as needed for moderate to severe pain (Patient not taking: Reported on 1/24/2024) 20 tablet 0     traMADoL (ULTRAM) 50 mg tablet [TRAMADOL (ULTRAM) 50 MG TABLET] Take 1 tablet (50 mg total) by mouth every 6 (six) hours as needed for pain. (Patient not taking: Reported on 1/24/2024) 16  "tablet 0       ALLERGIES:  Allergies   Allergen Reactions     Escitalopram Hives and Itching     Gabapentin Other (See Comments)     Nortriptyline Other (See Comments)     Migraine at 50 mg dose       LABS/IMAGING/MEDICAL RECORDS REVIEWED    PHYSICAL EXAM:  /84   Ht 1.613 m (5' 3.5\")   Wt 105 kg (231 lb 8 oz)   BMI 40.37 kg/m    Pleasant and in no distress. Accompanied by her fiance Vitor    In summary,   Pinky has hyperlipidemia, fibromyalgia, multiple joint pain in the setting of morbid obesity. Her BMI is 40.37. This satisfies NIH criteria for bariatric surgery. Once Pinky has been cleared by our bariatric psychologist and our bariatric dietitian, completed initial lab work, attended one support group, and satisfied insurance mandated visits if applicable, she  will be scheduled with  Dr. Bunn or  for Bariatric Surgery Consultation. She is interested in the  [facility] due to her fiance and her GERD.  Pinky understands that routine health care maintenance will need to be up to date prior to surgery. she verbalizes understanding of the process to surgery and expectations for the postoperative period including the need for lifelong lifestyle changes, vitamin supplementation, and laboratory monitoring.       Weight will need to be 236# prior to submitting for insurance approval.  Standard DVT protocol  Sleep Study not indicated  HCM is UTD  Pinky was reminded to prevent pregnancy for 18-24 months post operatively.   She has had an ablation and partner with vasectomy  Tobacco cessation 1/19/2024. Will check tobacco metabolites. Enrolled in [employer] for support.      Sincerely,          Fawn Young MD     Total time spent on the date of this encounter doing: chart review, review of test results, patient visit, physical exam, education, counseling, developing plan of care, and documenting = 60 minutes.       Again, thank you for allowing me to participate in the care of your " patient.        Sincerely,        Fawn Young MD

## 2024-01-24 NOTE — PROGRESS NOTES
"New Bariatric Surgery Consultation Note    2024    RE: Pinky Post  MR#: [id number]  : 1970      Referring provider:       2024     6:58 PM   --   Who referred you Pratibha Pastrana/ primary physician       Chief Complaint/Reason for visit: evaluation for possible weight loss surgery    Dear Dr. Pastrana,    I had the pleasure of seeing your patient, Pinky Post, to evaluate her obesity and consider her for possible weight loss surgery. As you know, Pinky Post is 53 year old.  She has a height of 5' 3.5\", a weight of 231 lbs 8 oz, and calculated Body mass index is 40.37 kg/m .        HISTORY OF PRESENT ILLNESS:      2024     6:58 PM   Weight Loss History Reviewed with Patient   How long have you been overweight? Since early childhood   What is the most that you have ever weighed 236   What is the most weight you have lost? 60   I have tried the following methods to lose weight Watching portions or calories    Exercise    Weight Watchers    Atkins type diet (low carb/high protein)    Yadira Driver    Pre packaged meals ex: [employer]    SlMandae    Physician directed program   I have tried the following weight loss medications? (Check all that apply) None       CO-MORBIDITIES OF OBESITY INCLUDE:      2024     6:58 PM   --   I have the following health issues associated with obesity High Blood Pressure    GERD (Reflux)    Stress Incontinence       PAST MEDICAL HISTORY:  Past Medical History:   Diagnosis Date    Anxiety     Depression     Dyslipidemia     Fatigue     Fibromyalgia 2024    Gastroesophageal reflux disease     Morbid obesity (H)     Morbid obesity (H) 2024    Multiple joint pain     PONV (postoperative nausea and vomiting)     Tobacco use disorder        PAST SURGICAL HISTORY:  Past Surgical History:   Procedure Laterality Date    ABDOMINOPLASTY      BREAST SURGERY      CHOLECYSTECTOMY      CREATION, VAGINAL WALL SLING N/A 2023    Procedure: " TRANSVAGINAL TAPE SLING, HYSTEROSCOPY WITH DILATION AND CURETTAGE WITH ENDOMETRIAL ABLATION;  Surgeon: Nader Deal MD;  Location: [facility] OR    CYSTOURETHROSCOPY N/A 3/30/2021    Procedure: CYSTOSCOPY WITH BLADDER HYDRODYSTENSION, , PUDENDAL NERVE BLOCK;  Surgeon: Sheila Lewis MD;  Location: [facility];  Service: Gynecology    OVARIAN CYST REMOVAL      TONSILLECTOMY         FAMILY HISTORY:   No family history on file.    SOCIAL HISTORY:       1/23/2024     6:58 PM   Social History Questions Reviewed With Patient   Which best describes your employment status (select all that apply) I am disabled   Which best describes your marital status    Who do you have in your support network that can be available to help you for the first 2 weeks after surgery? 2 adult daughters, fiance, cousin, aunt, friends   Who can you count on for support throughout your weight loss surgery journey? Daughters, fiance, other family, and friends       HABITS:      1/23/2024     6:58 PM   --   How often do you drink alcohol? 2-4 times a month   If you do drink alcohol, how many drinks might you have in a day? (one drink = 5 oz. wine, 1 can/bottle of beer, 1 shot liquor) 1 or 2   Do you currently use any of the following Nicotine products? No   Have you ever used any of the following nicotine products? Cigarettes   If you previously used any of these products, what year did you quit? 2024   Have you or are you currently using street drugs or prescription strength medication for which you do not have a prescription for? No   Do you have a history of chemical dependency (alcohol or drug abuse)? No       PSYCHOLOGICAL HISTORY:       1/23/2024     6:58 PM   Psychological History Reviewed With Patient   Have you ever attempted suicide? Never.   Have you had thoughts of suicide in the past year? No   Have you ever been hospitalized for mental illness or a suicide attempt? In the last 5 to 10 years.   Do  you have a history of chronic pain? Yes   Have you ever been diagnosed with fibromyalgia? Yes   Are you currently being treated for any of the following? (select all that apply) Depression    Anxiety   Are you currently seeing a therapist or counselor? Yes   Are you currently seeing a psychiatrist? Yes       ROS:      1/23/2024     6:58 PM   --   Skin Leg swelling   HEENT Headaches    Dizziness/lightheadedness   Musculoskeletal Joint Pain    Back pain    Limited mobility    Swelling of legs    Arthritis   Cardiovascular Shortness of breath with activity   Pulmonary Shortness of breath with activity    Snoring    Awaken from sleep to catch your breath    Experience morning headaches   Gastrointestinal Heartburn    Reflux   Genitourinary Stress incontinence (losing urine when coughing, sneezing, etc.)   Hematological None of the above   Neurological Migraine headaches   Female only None of the above       EATING BEHAVIORS:      1/23/2024     6:58 PM   --   Have you or anyone else thought that you had an eating disorder? No   Do you currently binge eat (eat a large amount of food in a short time)? No   Are you an emotional eater? Yes   Do you get up to eat after falling asleep? No   Can you afford 3 meals a day? Yes   Can you afford 50-60 dollars a month for vitamins? Yes       EXERCISE:      1/23/2024     6:58 PM   --   How often do you exercise? 1 to 2 times per week   What is the duration of your exercise (in minutes)? 30 Minutes   What types of exercise do you do? other   What keeps you from being more active? Pain    My ability to walk or move around is limited    Shortness of breath    Too tired       MEDICATIONS:  Current Outpatient Medications   Medication Sig Dispense Refill    acetaminophen (TYLENOL) 500 MG tablet [ACETAMINOPHEN (TYLENOL) 500 MG TABLET] Take 1,000 mg by mouth every 6 (six) hours as needed for pain.      amitriptyline (ELAVIL) 10 MG tablet [AMITRIPTYLINE (ELAVIL) 10 MG TABLET] Take 10 mg by  "mouth.      buPROPion (WELLBUTRIN XL) 150 MG 24 hr tablet Take 1 tablet by mouth every morning      clonazePAM (KLONOPIN) 1 MG tablet [CLONAZEPAM (KLONOPIN) 1 MG TABLET] Take 2 mg by mouth.      fluticasone propionate (FLONASE) 50 mcg/actuation nasal spray [FLUTICASONE PROPIONATE (FLONASE) 50 MCG/ACTUATION NASAL SPRAY] 2 sprays into each nostril.      hydrOXYzine HCL (ATARAX) 25 MG tablet [HYDROXYZINE HCL (ATARAX) 25 MG TABLET] Take 25 mg by mouth.      ibuprofen (ADVIL,MOTRIN) 600 MG tablet [IBUPROFEN (ADVIL,MOTRIN) 600 MG TABLET] Take 1 tablet (600 mg total) by mouth every 6 (six) hours as needed for pain. 30 tablet 0    ibuprofen (ADVIL/MOTRIN) 800 MG tablet Take 1 tablet (800 mg) by mouth every 6 hours as needed for other (mild and/or inflammatory pain) 30 tablet 0    pantoprazole (PROTONIX) 40 MG EC tablet Take 1 tablet by mouth daily      senna-docusate (SENOKOT-S/PERICOLACE) 8.6-50 MG tablet Take 1-2 tablets by mouth 2 times daily 30 tablet 0    venlafaxine (EFFEXOR-XR) 150 MG 24 hr capsule [VENLAFAXINE (EFFEXOR-XR) 150 MG 24 HR CAPSULE] Take by mouth.      ondansetron (ZOFRAN ODT) 4 MG ODT tab Take 1 tablet (4 mg) by mouth every 8 hours as needed for nausea (Patient not taking: Reported on 1/24/2024) 4 tablet 0    oxyCODONE (ROXICODONE) 5 MG tablet Take 1-2 tablets (5-10 mg) by mouth every 4 hours as needed for moderate to severe pain (Patient not taking: Reported on 1/24/2024) 20 tablet 0    traMADoL (ULTRAM) 50 mg tablet [TRAMADOL (ULTRAM) 50 MG TABLET] Take 1 tablet (50 mg total) by mouth every 6 (six) hours as needed for pain. (Patient not taking: Reported on 1/24/2024) 16 tablet 0       ALLERGIES:  Allergies   Allergen Reactions    Escitalopram Hives and Itching    Gabapentin Other (See Comments)    Nortriptyline Other (See Comments)     Migraine at 50 mg dose       LABS/IMAGING/MEDICAL RECORDS REVIEWED    PHYSICAL EXAM:  /84   Ht 1.613 m (5' 3.5\")   Wt 105 kg (231 lb 8 oz)   BMI 40.37 kg/m  "   Pleasant and in no distress. Accompanied by her fiance Vitor    In summary,   Pinky has hyperlipidemia, fibromyalgia, multiple joint pain in the setting of morbid obesity. Her BMI is 40.37. This satisfies NIH criteria for bariatric surgery. Once Pinky has been cleared by our bariatric psychologist and our bariatric dietitian, completed initial lab work, attended one support group, and satisfied insurance mandated visits if applicable, she  will be scheduled with  Dr. Bunn or  for Bariatric Surgery Consultation. She is interested in the  [facility] due to her fiance and her GERD.  Pinky understands that routine health care maintenance will need to be up to date prior to surgery. she verbalizes understanding of the process to surgery and expectations for the postoperative period including the need for lifelong lifestyle changes, vitamin supplementation, and laboratory monitoring.       Weight will need to be 236# prior to submitting for insurance approval.  Standard DVT protocol  Sleep Study not indicated  HCM is UTD  Pinky was reminded to prevent pregnancy for 18-24 months post operatively.   She has had an ablation and partner with vasectomy  Tobacco cessation 1/19/2024. Will check tobacco metabolites. Enrolled in [employer] for support.      Sincerely,          Fawn Young MD     Total time spent on the date of this encounter doing: chart review, review of test results, patient visit, physical exam, education, counseling, developing plan of care, and documenting = 60 minutes.

## 2024-01-25 ENCOUNTER — VIRTUAL VISIT (OUTPATIENT)
Dept: SURGERY | Facility: CLINIC | Age: 54
End: 2024-01-25
Payer: COMMERCIAL

## 2024-01-25 DIAGNOSIS — E66.01 MORBID OBESITY (H): Primary | ICD-10-CM

## 2024-01-25 LAB
ALBUMIN SERPL BCG-MCNC: 4.2 G/DL (ref 3.5–5.2)
ALP SERPL-CCNC: 67 U/L (ref 40–150)
ALT SERPL W P-5'-P-CCNC: 38 U/L (ref 0–50)
ANION GAP SERPL CALCULATED.3IONS-SCNC: 10 MMOL/L (ref 7–15)
AST SERPL W P-5'-P-CCNC: 25 U/L (ref 0–45)
BILIRUB SERPL-MCNC: <0.2 MG/DL
BUN SERPL-MCNC: 18.8 MG/DL (ref 6–20)
CALCIUM SERPL-MCNC: 9 MG/DL (ref 8.6–10)
CHLORIDE SERPL-SCNC: 104 MMOL/L (ref 98–107)
CREAT SERPL-MCNC: 0.71 MG/DL (ref 0.51–0.95)
DEPRECATED HCO3 PLAS-SCNC: 23 MMOL/L (ref 22–29)
EGFRCR SERPLBLD CKD-EPI 2021: >90 ML/MIN/1.73M2
FERRITIN SERPL-MCNC: 28 NG/ML (ref 11–328)
FOLATE SERPL-MCNC: 9.6 NG/ML (ref 4.6–34.8)
GLUCOSE SERPL-MCNC: 98 MG/DL (ref 70–99)
POTASSIUM SERPL-SCNC: 4 MMOL/L (ref 3.4–5.3)
PROT SERPL-MCNC: 6.9 G/DL (ref 6.4–8.3)
PTH-INTACT SERPL-MCNC: 54 PG/ML (ref 15–65)
SODIUM SERPL-SCNC: 137 MMOL/L (ref 135–145)
TSH SERPL DL<=0.005 MIU/L-ACNC: 2.02 UIU/ML (ref 0.3–4.2)
VIT B12 SERPL-MCNC: 462 PG/ML (ref 232–1245)
VIT D+METAB SERPL-MCNC: 25 NG/ML (ref 20–50)
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
## 2024-01-27 LAB — ZINC SERPL-MCNC: 72.5 UG/DL

## 2024-01-28 LAB
ANNOTATION COMMENT IMP: NORMAL
RETINYL PALMITATE SERPL-MCNC: 0.03 MG/L
VIT A SERPL-MCNC: 0.59 MG/L

## 2024-01-29 ENCOUNTER — TELEPHONE (OUTPATIENT)
Dept: SURGERY | Facility: CLINIC | Age: 54
End: 2024-01-29
Payer: COMMERCIAL

## 2024-01-29 LAB — VIT B1 PYROPHOSHATE BLD-SCNC: 131 NMOL/L

## 2024-01-29 NOTE — TELEPHONE ENCOUNTER
I have spoken to Pinky about the surgical weight loss program and her insurance.  Since she's starting at a BMI of 40, she was concerned about losing weight while being cleared.  I did let her know that they are expecting her to lose some weight due to the healthy changes she will be making in preparation for surgery.  She has [employer] that is a medicare sign over plan.  We will need to submit for approval for her surgery.  She will follow Medicare guidelines however.

## 2024-02-02 ENCOUNTER — VIRTUAL VISIT (OUTPATIENT)
Dept: SURGERY | Facility: CLINIC | Age: 54
End: 2024-02-02
Payer: COMMERCIAL

## 2024-02-02 DIAGNOSIS — Z71.3 NUTRITIONAL COUNSELING: ICD-10-CM

## 2024-02-02 DIAGNOSIS — E78.5 DYSLIPIDEMIA: Primary | ICD-10-CM

## 2024-02-02 DIAGNOSIS — E66.813 CLASS 3 SEVERE OBESITY DUE TO EXCESS CALORIES WITH SERIOUS COMORBIDITY AND BODY MASS INDEX (BMI) OF 40.0 TO 44.9 IN ADULT (H): ICD-10-CM

## 2024-02-02 DIAGNOSIS — E66.01 CLASS 3 SEVERE OBESITY DUE TO EXCESS CALORIES WITH SERIOUS COMORBIDITY AND BODY MASS INDEX (BMI) OF 40.0 TO 44.9 IN ADULT (H): ICD-10-CM

## 2024-02-02 PROCEDURE — 97802 MEDICAL NUTRITION INDIV IN: CPT | Mod: 95 | Performed by: DIETITIAN, REGISTERED

## 2024-02-02 NOTE — PROGRESS NOTES
"Pinky Post is a 53 year old who is being evaluated via a billable video visit.      How would you like to obtain your AVS? MyChart  If the video visit is dropped, the invitation should be resent by: Send to e-mail at: [email or web address]  Will anyone else be joining your video visit? No        Initial Structured Weight Loss Supervised Diet Evaluation     Assessment:  Pinky is being seen today for initial RD nutritional evaluation. Patient has been unsuccessful with non-surgical weight loss methods and is interested in bariatric surgery. Today we reviewed current eating habits and level of physical activity, and instructed on the changes that are required for successful bariatric outcomes.      Surgery of interest per pt: RNY.    Workflow review:  Support Group: Not completed.  Psychology:In progress.  Lab work:Completed.  SWL:No   Quit Smoking-1/19/2024    Weight goal: At or below initial.    Anthropometrics:  Pt's weight is 231.5 lbs  Initial weight: 231.5 lbs   Weight change: 0  BMI: There is no height or weight on file to calculate BMI.   Ideal body weight: 53.5 kg (118 lb 0.9 oz)  Adjusted ideal body weight: 74.1 kg (163 lb 6.9 oz)    Medical History:  Patient Active Problem List   Diagnosis    Fibromyalgia    Multiple joint pain    Fatigue    Dyslipidemia    Morbid obesity (H)      Diabetes: No   HbA1c:  No results found for: \"HGBA1C\"      Nutrition History  Food allergies/intolerances/cultural or religous food customs: No -question a sensitivity to dairy (such as ice cream)     Vitamins/Mineral currently taking: MVI, Vitamin B12, Calcium with Vitamin D3    Socioeconomic Status  Who does the grocery shopping for your household? Significant Other     Who prepares your meals at home? Significant Other    Diet Recall/Time  Breakfast: skipped due to getting up later in the day  Lunch: ham sandwich (small bun, 2 pieces of ham), chips or crackers or salad with protein   Dinner: salad with protein or spaghetti " or pot roast or grilled meats (burger or chicken breast) with carrots and potatoes, onions or asparagus or broccoli and cauliflower    Typical Snacks: usually something crunchy-popcorn or chips or crackers (tends to be her downfall)-typically after supper    Overnight eating: No    Eating out: ~1 time/week (take out)    Beverages  Diet Pop (4 cans/day), Tea, Sparkling Water, Water    Exercise  No set regimen-does try to do stretching, looking into purchasing a walking pad    Nutrition Diagnosis (PES statement)    Obesity (NC 3.3) related to overeating and poor lifestyle habits as evidenced by patient's subjective statements and BMI 40.4 kg/m2.     Intervention    Nutrition Education:   Provided general overview of diet and lifestyle modifications needed to be a deemed a safe candidate for bariatric surgery.   Educated patient on how to read a food label: choosing foods with than 10 grams fat and 10 grams sugar per serving to avoid dumping syndrome.  Dumping Syndrome: Described the mechanisms of syndrome, symptoms, and prevention tools from a dietary perspective.   Vitamins: Educated on post-op vitamin regimen including MVI+ 18 mg Fe two times a day, calcium citrate 400-600 mg two times a day, 6822-6647 mcg sublingual B12 daily, 5000 IU vitamin D3 daily.     Food/Nutrient Delivery:  Educated patient on eating three meals, with cutting out snacking.  Bariatric Plate: Patient and I discussed the importance of including a lean protein source (20-30 grams/meal), vegetables (included at lunch and dinner), one serving (15g) of carbohydrate, and limited added fat (1 tb/day) at each meal.   Discussed importance of adequate hydration after surgery, with goal of at least 64 oz of fluids/day.  Addressed avoiding all carbonated, caffeinated and sweetened drinks to prepare for bariatric surgery.     Nutrition Counseling:  Mindful eating techniques: Encouraged slow meal pace, chewing foods to applesauce consistency for 20-30  minutes/meal.   Discussed  fluids 30 minutes before, during, and after meal to prevent dumping syndrome and discomfort post bariatric surgery.       Instructions/Goals:     Start implementing bariatric surgery lifestyle modifications.    Handouts Provided:   [facility] Bariatric Surgery Nutrition Info  Protein supplement list    Monitor/Evaluation:  Pt. s target weight: no gain from initial visit, patient verbalized understanding.     Plan for next visit:     (Final Supervised Diet visit with RD) pre/post-op  diet progression, give review of surgery process.      Video-Visit Details    Type of service:  Video Visit    Video Start Time (time video started): 7:46 AM    Video End Time (time video stopped): 8:25 AM    Originating Location (pt. Location): Home      Distant Location (provider location):  Off-site    Mode of Communication:  Video Conference via [facility]    Physician has received verbal consent for a Video Visit from the patient? Yes      Trudi Peacock, ITZEL

## 2024-02-02 NOTE — LETTER
"    2/2/2024         RE: Pinky Post  [address]  [facility] [address]        Dear Colleague,    Thank you for referring your patient, Pinky Post, to the [facility] [place]. Please see a copy of my visit note below.    Pinky Post is a 53 year old who is being evaluated via a billable video visit.      How would you like to obtain your AVS? MyChart  If the video visit is dropped, the invitation should be resent by: Send to e-mail at: [email or web address]  Will anyone else be joining your video visit? No        Initial Structured Weight Loss Supervised Diet Evaluation     Assessment:  Pinky is being seen today for initial RD nutritional evaluation. Patient has been unsuccessful with non-surgical weight loss methods and is interested in bariatric surgery. Today we reviewed current eating habits and level of physical activity, and instructed on the changes that are required for successful bariatric outcomes.      Surgery of interest per pt: RNY.    Workflow review:  Support Group: Not completed.  Psychology:In progress.  Lab work:Completed.  SWL:No   Quit Smoking-1/19/2024    Weight goal: At or below initial.    Anthropometrics:  Pt's weight is 231.5 lbs  Initial weight: 231.5 lbs   Weight change: 0  BMI: There is no height or weight on file to calculate BMI.   Ideal body weight: 53.5 kg (118 lb 0.9 oz)  Adjusted ideal body weight: 74.1 kg (163 lb 6.9 oz)    Medical History:  Patient Active Problem List   Diagnosis     Fibromyalgia     Multiple joint pain     Fatigue     Dyslipidemia     Morbid obesity (H)      Diabetes: No   HbA1c:  No results found for: \"HGBA1C\"      Nutrition History  Food allergies/intolerances/cultural or religous food customs: No -question a sensitivity to dairy (such as ice cream)     Vitamins/Mineral currently taking: MVI, Vitamin B12, Calcium with Vitamin D3    Socioeconomic Status  Who does the grocery shopping for your " household? Significant Other     Who prepares your meals at home? Significant Other    Diet Recall/Time  Breakfast: skipped due to getting up later in the day  Lunch: ham sandwich (small bun, 2 pieces of ham), chips or crackers or salad with protein   Dinner: salad with protein or spaghetti or pot roast or grilled meats (burger or chicken breast) with carrots and potatoes, onions or asparagus or broccoli and cauliflower    Typical Snacks: usually something crunchy-popcorn or chips or crackers (tends to be her downfall)-typically after supper    Overnight eating: No    Eating out: ~1 time/week (take out)    Beverages  Diet Pop (4 cans/day), Tea, Sparkling Water, Water    Exercise  No set regimen-does try to do stretching, looking into purchasing a walking pad    Nutrition Diagnosis (PES statement)    Obesity (NC 3.3) related to overeating and poor lifestyle habits as evidenced by patient's subjective statements and BMI 40.4 kg/m2.     Intervention    Nutrition Education:   Provided general overview of diet and lifestyle modifications needed to be a deemed a safe candidate for bariatric surgery.   Educated patient on how to read a food label: choosing foods with than 10 grams fat and 10 grams sugar per serving to avoid dumping syndrome.  Dumping Syndrome: Described the mechanisms of syndrome, symptoms, and prevention tools from a dietary perspective.   Vitamins: Educated on post-op vitamin regimen including MVI+ 18 mg Fe two times a day, calcium citrate 400-600 mg two times a day, 0951-8682 mcg sublingual B12 daily, 5000 IU vitamin D3 daily.     Food/Nutrient Delivery:  Educated patient on eating three meals, with cutting out snacking.  Bariatric Plate: Patient and I discussed the importance of including a lean protein source (20-30 grams/meal), vegetables (included at lunch and dinner), one serving (15g) of carbohydrate, and limited added fat (1 tb/day) at each meal.   Discussed importance of adequate hydration after  surgery, with goal of at least 64 oz of fluids/day.  Addressed avoiding all carbonated, caffeinated and sweetened drinks to prepare for bariatric surgery.     Nutrition Counseling:  Mindful eating techniques: Encouraged slow meal pace, chewing foods to applesauce consistency for 20-30 minutes/meal.   Discussed  fluids 30 minutes before, during, and after meal to prevent dumping syndrome and discomfort post bariatric surgery.       Instructions/Goals:     Start implementing bariatric surgery lifestyle modifications.    Handouts Provided:   [facility] Bariatric Surgery Nutrition Info  Protein supplement list    Monitor/Evaluation:  Pt. s target weight: no gain from initial visit, patient verbalized understanding.     Plan for next visit:     (Final Supervised Diet visit with RD) pre/post-op  diet progression, give review of surgery process.      Video-Visit Details    Type of service:  Video Visit    Video Start Time (time video started): 7:46 AM    Video End Time (time video stopped): 8:25 AM    Originating Location (pt. Location): Home      Distant Location (provider location):  Off-site    Mode of Communication:  Video Conference via [facility]    Physician has received verbal consent for a Video Visit from the patient? Yes      Trudi Peacock RD        Again, thank you for allowing me to participate in the care of your patient.        Sincerely,        Trudi Peacock RD

## 2024-02-19 ENCOUNTER — VIRTUAL VISIT (OUTPATIENT)
Dept: SURGERY | Facility: CLINIC | Age: 54
End: 2024-02-19
Payer: COMMERCIAL

## 2024-02-19 ENCOUNTER — TRANSFERRED RECORDS (OUTPATIENT)
Dept: HEALTH INFORMATION MANAGEMENT | Facility: CLINIC | Age: 54
End: 2024-02-19

## 2024-02-19 DIAGNOSIS — E66.01 MORBID OBESITY (H): Primary | ICD-10-CM

## 2024-02-19 NOTE — PROGRESS NOTES
Virtual Visit Details    Type of service:  Video Visit   Video Start Time:  1:52 PM  Video End Time:2:37 PM    Originating Location (pt. Location): Home    Distant Location (provider location):  Off-site  Platform used for Video Visit: Deana Vance has started to make some changes in eating and lifestyle, but still needs to be more mindful about her eating. She will follow up with a list of activities and mindful eating strategies. F32.9; F43.9; E66.01

## 2024-03-01 ENCOUNTER — VIRTUAL VISIT (OUTPATIENT)
Dept: SURGERY | Facility: CLINIC | Age: 54
End: 2024-03-01
Payer: COMMERCIAL

## 2024-03-01 DIAGNOSIS — E66.01 OBESITY, CLASS III, BMI 40-49.9 (MORBID OBESITY) (H): Primary | ICD-10-CM

## 2024-03-01 DIAGNOSIS — Z71.3 NUTRITIONAL COUNSELING: ICD-10-CM

## 2024-03-01 PROCEDURE — 97803 MED NUTRITION INDIV SUBSEQ: CPT | Mod: 95

## 2024-03-01 NOTE — PROGRESS NOTES
Pinky Post is a 53 year old who is being evaluated via a billable video visit.      How would you like to obtain your AVS? MyChart  If the video visit is dropped, the invitation should be resent by: Send to e-mail at: [email or web address]  Will anyone else be joining your video visit? No         Follow Up Surgical Weight Loss Supervised Diet Evaluation    Assessment:  Pt. is being seen today for a follow up RD nutritional evaluation. Pt. has been unsuccessful with non-surgical weight loss methods and is interested in bariatric surgery. Today we reviewed current eating habits and level of physical activity, and instructed on the changes that are required for successful bariatric outcomes.    Surgery of interest per pt: RNJOSE A.    Workflow review:  Support Group: Completed. (2/20/2024)  Psychology:In progress.  Lab work:Completed.  SWL:No   Quit Smoking date-1/19/2024, nicotine test 4/19/2024    Weight goal: At or below initial.    Anthropometrics:  Pt's has not weight herself since last visit.  Initial weight: 231.5 lbs  Weight change: n/a  BMI: There is no height or weight on file to calculate BMI.   Ideal body weight: 53.5 kg (118 lb 0.9 oz)  Adjusted ideal body weight: 74.1 kg (163 lb 6.9 oz)    Medical History:  Patient Active Problem List   Diagnosis    Fibromyalgia    Multiple joint pain    Fatigue    Dyslipidemia    Morbid obesity (H)        Progress over past month: Patient reports she has made progress with limiting diet soda intake, is down o ~2 cans per day. Implementing a protein shake in the AM. Is adding more fruit at lunch time. Has adjusted her eating times in the day which she finds helpful to not snack. Practicing chewing her food to applesauce consistency.   Not cleared via [employer]  Still needing nicotine test clearance     Diet Recall/Time  Breakfast: protein shake (premiere protein)  Lunch: salad with added chicken breast and hard boiled eggs from salad bar in town OR Turkey sandwich (small bun, 2  pieces of ham), chips or crackers or salad with protein   Dinner: salad from salad bar in town OR salad with protein OR spaghetti or pot roast or grilled meats (burger or chicken breast) with carrots and potatoes, onions or asparagus or broccoli and cauliflower    Typical Snacks: SF popsicles      Overnight eating: No     Eating out: ~1-2 time/week (take out salad bar     Meal duration: 20-30 minutes.      fluids by 30 minutes before, during meal, and waiting 30 minutes after meal before drinking fluids: Yes - continues to practice     Beverages  Diet Pop down to 1 maybe two cans per day   Water 48-64oz    Exercise  No set regimen-does try to do stretching, has a walking pad at home    PES statement:   Morbid obesity related to excessive energy intake as evidenced by Intake of high caloric density foods/beverages (juice, soda, alcohol) at meals and/or snacks; large portions; frequent grazing; Estimated intake that exceeds estimated daily energy intake; Binge eating patterns; Frequent excessive fast food or restaurant intake; and BMI 40.36       Intervention    Nutrition Education:   Provided general overview of diet and lifestyle modifications needed to be a deemed a safe candidate for bariatric surgery.   Educated patient on how to read a food label: choosing foods with than 10 grams fat and 10 grams sugar per serving to avoid dumping syndrome.  Dumping Syndrome: Described the mechanisms of syndrome, symptoms, and prevention tools from a dietary perspective.   Vitamins: Educated on post-op vitamin regimen including MVI+ 18 mg Fe two times a day, calcium citrate 400-600 mg two times a day, 6854-5702 mcg sublingual B12 daily, 5000 IU vitamin D3 daily.     Food/Nutrient Delivery:  Educated patient on eating three meals, with cutting out snacking.  Bariatric Plate: Patient and I discussed the importance of including a lean protein source (20-30 grams/meal), vegetables (included at lunch and dinner), one  serving (15g) of carbohydrate, and limited added fat (1 tb/day) at each meal.   Educated patient on how to complete a food journal and benefits of meal planning.   Educated patient on using a protein powder drink as a meal replacement and/or supplement after bariatric surgery.   Discussed importance of adequate hydration after surgery, with goal of at least 64 oz of fluids/day.  Addressed avoiding all carbonated, caffeinated and sweetened drinks to prepare for bariatric surgery.     Nutrition Counseling:  Mindful eating techniques: Encouraged slow meal pace, chewing foods to applesauce consistency for 20-30 minutes/meal.   Discussed  fluids 30 minutes before, during, and after meal to prevent dumping syndrome and discomfort post bariatric surgery.   Discussed pre/post operative diet progression, post op vitamin regimen, gave review of surgery process.     Instructions/Goals:     Continue implementing bariatric surgery lifestyle modifications.    Handouts Provided:   [facility] Bariatric Surgery Nutrition Info  Food journal  Food label  Protein supplement list    Monitor/Evaluation:  Pt. s target weight: no gain from initial visit, pt. verbalized understanding.     Plan for next visit:   Check weight  Wean from caffeine       Video-Visit Details    Type of service:  Video Visit    Video Start Time (time video started): 12:57 PM    Video End Time (time video stopped): 1:19 PM    Originating Location (pt. Location): Home      Distant Location (provider location):  Off-site    Mode of Communication:  Video Conference via [facility]    Physician has received verbal consent for a Video Visit from the patient? Yes      Elizabeth Berg RD

## 2024-03-01 NOTE — PATIENT INSTRUCTIONS
PREPARING FOR WEIGHT-LOSS SURGERY    Lifestyle Changes Before and After Weight Loss Surgery: Keys for Success     Diet Guidelines after Weight Loss Surgery     Create a Plate    My Plate    Supplements after Sleeve Gastrectomy, Gastric Bypass or Single Anastomosis Duodenal Switch    Lean Protein Sources    Protein Source Portion Calories Grams of Protein                           Nonfat, plain Greek yogurt    (10 grams sugar or less) 3/4 cup (6 oz)  12-17   Light Yogurt (10 grams sugar or less) 3/4 cup (6 oz)  6-8   Protein Shake 1 shake 110-180 15-30   Skim/1% Milk or lactose-free milk 1 cup ( 8 oz)  8   Plain or light, flavored soymilk 1 cup  7-8   Plain or light, hemp milk 1 cup 110 6   Fat Free or 1% Cottage Cheese 1/2 cup 90 15   Part skim ricotta cheese 1/2 cup 100 14   Part skim or reduced fat cheese slices 1/4cup, 3 dice 65-80 8     Mozzarella String Cheese 1 80 8   Canned tuna, chicken, crab or salmon  (canned in water)  1/2 cup 100 15-20   White fish (broiled, grilled, baked) 3 ounces 100 21   [place]/Tuna (broiled, grilled, baked) 3 ounces 150-180 21   Shrimp, Scallops, Lobster, Crab 3 ounces 100 21   Pork loin, Pork Tenderloin 3 ounces 150 21   Boneless, skinless chicken /turkey breast                          (broiled, grilled, baked) 3 ounces 120 21   [place], [place], [place], and Venison 3 ounces 120 21   Lean cuts of red meat and pork (sirloin,   round, tenderloin, flank, ground 93%-96%) 3 ounces 170 21   Lean or Extra Lean Ground Turkey 1/2 cup 150 20   90-95% Lean [place] Burger 1 bry 140-180 21   Low-fat casserole with lean meat 3/4 cup 200 17   Luncheon Meats                                                        (turkey, lean ham, roast beef, chicken) 3 ounces    100 21   Egg (boiled, poached, scrambled) 1 Egg 60 7   Egg Substitute 1/2 cup 70 10   Nuts (limit to 1 serving per day)  3 Tbsp. 150 7   Nut [place] (peanut, almond)  Limit to 1 serving or less daily 1 Tbsp. 90 4    Soy Burger (varies) 1  10-15   Garbanzo, Black, Green Beans/Edamame 1/2 cup 110 7-9   Refried Beans 1/2 cup 100 7   Kidney and Lima beans 1/2 cup 110 7   Tempeh 3 oz 175 18   Vegan crumbles 1/2 cup 100 14   Tofu 1/2 cup 110 14   Chili (beans and extra lean beef or turkey) 1 cup 200 23   Lentils 1/2 cup 115 9   Black Bean Soup 1 cup 175 12        Quick and Easy Meals    Salad kit with rotisserie chicken, eggs, lunch meat, beans or tuna    Chicken nuggets on top of Caesar salad kit     Lunch meat sandwich or wrap with veggies (sugar snap peas, bell pepper slices, carrot sticks, celery, sliced cucumber, cherry tomatoes, etc.)    Greek or light yogurt with a handful of nuts and fruit    Cottage cheese, cherry tomatoes and Triscuit crackers    Refried bean and cheese quesadilla on whole wheat tortilla    Can of Progresso Light or [place] Well Yes soup - add additional leftover protein like chicken to boost protein    Ralls cakes pancake or waffles with peanut butter or berries    Baked sweet potato with black beans and salsa and a side salad    Adult lunchable: lunch meat, string cheese, crackers and veggies    Tuna Cucumber Boats: cut cucumber in half, scoop out cucumber seeds, fill with mixture of tuna, light keith, long mustard, red onion, banana peppers, dried dill, salt and pepper    Protein pasta salad: whole wheat or bean pasta with chicken sausage, broccoli and italian dressing    Egg sandwich on english muffin: egg, slice of cheese and piece of ham    Grilled cheese and turkey sandwich with a side salad or cup of soup    BBQ Flatbread: Flat Out high protein flatbread with rotisserie chicken, BBQ sauce and red onion, topped with mozzarella cheese and baked in the oven    Delight Chicken Wrap: Rotisserie chicken warmed up with An[place]'s Hot Sauce in a medium size tortilla with light ranch dressing. Add mixed greens, red onion, diced tomato, 2% shredded cheddar cheese.    Cottage cheese Pizza Bowl - mix  cottage cheese, marinara, mozzarella cheese, turkey pepperonis, italian seasoning, onions, bell peppers, heated up, serve with bell peppers and/or crackers    Cottage cheese Taco Bowl - mix cottage cheese, ground beef with taco seasoning, shredded cheese, lettuce, tomato, heated up    Greek cottage cheese bowl - mix cottage cheese, dill, vegetables (cucumber, bell pepper, cucumber, cherry tomatoes), salt and pepper, olives, feta, a little drizzle of olive oil     Whole wheat or bean pasta with pesto and chicken sausage     Sheet Pan Dinner: Chicken sausage, herbed baby potatoes, asparagus, carrots, and onions roasted in olive oil    Omelet with chicken or beans, low-fat cheese, veggies (bell pepper, tomato, spinach, mushroom, onions), and salsa    Whole wheat toast topped with mashed avocado, sliced tomato, and a fried egg     Berry Salad: Spinach/lettuce, berries, grilled chicken/salmon/tofu, low-fat feta cheese, with vinaigrette dressing     Gerald-Essie Salad: Mixed greens, chicken breast, black beans, corn, diced tomatoes, green onions, cheddar cheese, cilantro, crushed tortilla chips, and a dressing of light ranch mixed with taco seasoning    Sweet potato, bell pepper, chickpeas, onion, and tomato sauteed in light oil with spices of choice (paprika, cumin, guillermina, garlic, cayenne, black pepper)    Cranberry Apple Quinoa Salad: quinoa, grilled chicken, diced apple, celery, green onion, unsweetened dried cranberries, and chopped pecans, with a dressing of olive oil, long mustard, and honey    Asian Cabbage Salad: Sliced green and red cabbage, sliced bell pepper, edamame, shredded carrots, cilantro, slivered almonds, and grilled chicken or tofu, with guillermina peanut dressing

## 2024-03-01 NOTE — LETTER
3/1/2024         RE: Pinky Post  [address]  [place] [address]        Dear Colleague,    Thank you for referring your patient, Pinky Post, to the [facility] SURGERY [facility] AND BARIATRICS CARE [place]. Please see a copy of my visit note below.    Pinky Post is a 53 year old who is being evaluated via a billable video visit.      How would you like to obtain your AVS? MyChart  If the video visit is dropped, the invitation should be resent by: Send to e-mail at: [email or web address]  Will anyone else be joining your video visit? No         Follow Up Surgical Weight Loss Supervised Diet Evaluation    Assessment:  Pt. is being seen today for a follow up RD nutritional evaluation. Pt. has been unsuccessful with non-surgical weight loss methods and is interested in bariatric surgery. Today we reviewed current eating habits and level of physical activity, and instructed on the changes that are required for successful bariatric outcomes.    Surgery of interest per pt: RNY.    Workflow review:  Support Group: Completed. (2/20/2024)  Psychology:In progress.  Lab work:Completed.  SWL:No   Quit Smoking date-1/19/2024, nicotine test 4/19/2024    Weight goal: At or below initial.    Anthropometrics:  Pt's has not weight herself since last visit.  Initial weight: 231.5 lbs  Weight change: n/a  BMI: There is no height or weight on file to calculate BMI.   Ideal body weight: 53.5 kg (118 lb 0.9 oz)  Adjusted ideal body weight: 74.1 kg (163 lb 6.9 oz)    Medical History:  Patient Active Problem List   Diagnosis     Fibromyalgia     Multiple joint pain     Fatigue     Dyslipidemia     Morbid obesity (H)        Progress over past month: Patient reports she has made progress with limiting diet soda intake, is down o ~2 cans per day. Implementing a protein shake in the AM. Is adding more fruit at lunch time. Has adjusted her eating times in the day which she finds helpful to not snack. Practicing  chewing her food to applesauce consistency.   Not cleared via [facility]  Still needing nicotine test clearance     Diet Recall/Time  Breakfast: protein shake (premiere protein)  Lunch: salad with added chicken breast and hard boiled eggs from salad bar in town OR Turkey sandwich (small bun, 2 pieces of ham), chips or crackers or salad with protein   Dinner: salad from salad bar in town OR salad with protein OR spaghetti or pot roast or grilled meats (burger or chicken breast) with carrots and potatoes, onions or asparagus or broccoli and cauliflower    Typical Snacks: SF popsicles      Overnight eating: No     Eating out: ~1-2 time/week (take out salad bar     Meal duration: 20-30 minutes.      fluids by 30 minutes before, during meal, and waiting 30 minutes after meal before drinking fluids: Yes - continues to practice     Beverages  Diet Pop down to 1 maybe two cans per day   Water 48-64oz    Exercise  No set regimen-does try to do stretching, has a walking pad at home    PES statement:   Morbid obesity related to excessive energy intake as evidenced by Intake of high caloric density foods/beverages (juice, soda, alcohol) at meals and/or snacks; large portions; frequent grazing; Estimated intake that exceeds estimated daily energy intake; Binge eating patterns; Frequent excessive fast food or restaurant intake; and BMI 40.36       Intervention    Nutrition Education:   Provided general overview of diet and lifestyle modifications needed to be a deemed a safe candidate for bariatric surgery.   Educated patient on how to read a food label: choosing foods with than 10 grams fat and 10 grams sugar per serving to avoid dumping syndrome.  Dumping Syndrome: Described the mechanisms of syndrome, symptoms, and prevention tools from a dietary perspective.   Vitamins: Educated on post-op vitamin regimen including MVI+ 18 mg Fe two times a day, calcium citrate 400-600 mg two times a day, 4079-6426 mcg sublingual B12  daily, 5000 IU vitamin D3 daily.     Food/Nutrient Delivery:  Educated patient on eating three meals, with cutting out snacking.  Bariatric Plate: Patient and I discussed the importance of including a lean protein source (20-30 grams/meal), vegetables (included at lunch and dinner), one serving (15g) of carbohydrate, and limited added fat (1 tb/day) at each meal.   Educated patient on how to complete a food journal and benefits of meal planning.   Educated patient on using a protein powder drink as a meal replacement and/or supplement after bariatric surgery.   Discussed importance of adequate hydration after surgery, with goal of at least 64 oz of fluids/day.  Addressed avoiding all carbonated, caffeinated and sweetened drinks to prepare for bariatric surgery.     Nutrition Counseling:  Mindful eating techniques: Encouraged slow meal pace, chewing foods to applesauce consistency for 20-30 minutes/meal.   Discussed  fluids 30 minutes before, during, and after meal to prevent dumping syndrome and discomfort post bariatric surgery.   Discussed pre/post operative diet progression, post op vitamin regimen, gave review of surgery process.     Instructions/Goals:     Continue implementing bariatric surgery lifestyle modifications.    Handouts Provided:    [facility] [place] [facility] Nutrition Info  Food journal  Food label  Protein supplement list    Monitor/Evaluation:  Pt. s target weight: no gain from initial visit, pt. verbalized understanding.     Plan for next visit:   Check weight  Wean from caffeine       Video-Visit Details    Type of service:  Video Visit    Video Start Time (time video started): 12:57 PM    Video End Time (time video stopped): 1:19 PM    Originating Location (pt. Location): Home      Distant Location (provider location):  Off-site    Mode of Communication:  Video Conference via [facility]    Physician has received verbal consent for a Video Visit from the patient?  Yes      Elizabeth Berg RD             Again, thank you for allowing me to participate in the care of your patient.        Sincerely,        Elizabeth Berg RD

## 2024-03-04 ENCOUNTER — VIRTUAL VISIT (OUTPATIENT)
Dept: SURGERY | Facility: CLINIC | Age: 54
End: 2024-03-04
Payer: COMMERCIAL

## 2024-03-04 DIAGNOSIS — E66.01 MORBID OBESITY (H): Primary | ICD-10-CM

## 2024-03-04 NOTE — PROGRESS NOTES
Virtual Visit Details    Type of service:  Video Visit   Video Start Time:  10:50 AM  Video End Time: 11:15 AM    Originating Location (pt. Location): Home    Distant Location (provider location):  Off-site  Platform used for Video Visit: Zoom (Telehealth)    Pinky has made quality changes in eating and lifestyle, and appears more mindful about her eating. She is now ready to proceed with surgery. A report was sent to the clinic. F32.9; F43.9; E66.01

## 2024-03-06 ENCOUNTER — TELEPHONE (OUTPATIENT)
Dept: SURGERY | Facility: CLINIC | Age: 54
End: 2024-03-06
Payer: COMMERCIAL

## 2024-03-06 DIAGNOSIS — Z87.891 PERSONAL HISTORY OF TOBACCO USE, PRESENTING HAZARDS TO HEALTH: Primary | ICD-10-CM

## 2024-03-06 NOTE — TELEPHONE ENCOUNTER
Pinky called back and she will make her mammogram appointment for April 19, 2024.  She had it done last year on 4/17.  She is also going to schedule her nicotine screen for the same day while she is there.  Please put in the order for this to [facility]      Thank you!

## 2024-04-08 ENCOUNTER — VIRTUAL VISIT (OUTPATIENT)
Dept: SURGERY | Facility: CLINIC | Age: 54
End: 2024-04-08
Payer: COMMERCIAL

## 2024-04-08 ENCOUNTER — MYC MEDICAL ADVICE (OUTPATIENT)
Dept: SURGERY | Facility: CLINIC | Age: 54
End: 2024-04-08

## 2024-04-08 DIAGNOSIS — E66.01 OBESITY, CLASS III, BMI 40-49.9 (MORBID OBESITY) (H): Primary | ICD-10-CM

## 2024-04-08 PROCEDURE — 97803 MED NUTRITION INDIV SUBSEQ: CPT | Mod: 95

## 2024-04-08 NOTE — PATIENT INSTRUCTIONS
Diet Advancement Overview    Bariatric 2-Week Pre-Surgery Liquid Diet    3 Protein Shakes per Day    Cream of Wheat, Cream of Rice or Grits (plain), quinoa flakes    Sugar-free pudding, unsweetened applesauce and low-sugar, non-fat Greek yogurt or Light yogurt    Soups: beef/chicken and veggies, tomato soup and low-fat cream soups    Bariatric Clear Liquid Diet: Day before Surgery and 1-2 Meals after Surgery    Water, sugar-free clear liquid drinks, diluted fruit juice    Sugar-free Jell-O, Sugar-free Popsicles     Vegetable, chicken, or beef broth (regular or low-sodium)    Sip   ounce every 3-5 minutes (Post Op)      Bariatric Full Liquid Diet: First Week after Surgery (Duration: 1 Week RNY; 2 Weeks DS, LSG)   Cream of Wheat, Cream of Rice or Quinoa flakes   Sugar-free pudding, unsweetened applesauce, blended canned fruit in light juice or water    Protein Shakes (see approved list)   Light Yogurt or plain non-fat Greek yogurt (no fruit chunks: vanilla, lemon, etc.)   Low-fat blended & strained soups    Bariatric Pureed Diet:   RNY Gastric Bypass & Lap Band: 2 week duration   Duodenal Switch and Gastric Sleeve: 3 week duration   Blended canned tuna, chicken, or salmon mixed with light keith/Greek yogurt   Blended moist meats--lean ground turkey with spaghetti sauce and oregano, lean pork/beef/turkey/fish or baby food meats   1% cottage cheese or ricotta cheese, Fat free refried beans   Thinned instant mashed potatoes (with added protein powder)   Blended fruits and vegetables (avoid skins, peels, and membranes)    Bariatric Soft Solids   Tender or moist meat, poultry, fish   Soft cooked vegetables    Tuna, chicken, crab, or egg salad (made with plain Greek yogurt or light mayonnaise)   Banana, seedless melons, canned or other soft fruits without skins/membranes   Whole-grain crackers (ingredient listed with the word  whole )     Bariatric Regular Diet:    Avoid oatmeal, bread, rice, pasta, noodles, and white  crackers (Saltines, Ritz, etc.) and foods with skins, peels, membranes, and seeds for 3 months after surgery.    Skinless, boneless moist chicken and turkey breasts     Pork loin, pork tenderloin--add pureed apricots/unsweetened applesauce for moisture    93%-96% lean beef (sirloin, round, flank, hamburger)    Fresh or frozen fruits and veggies     Toasted whole grain bread--sandwich thins, bagel thins, light wheat bread    Brown/wild rice, quinoa, whole-grain crackers, etc.

## 2024-04-08 NOTE — LETTER
4/8/2024         RE: Pinky Post  [address]  [place] [address]        Dear Colleague,    Thank you for referring your patient, Pinky Post, to the [facility] SURGERY CLINIC AND BARIATRICS CARE [place]. Please see a copy of my visit note below.    Pinky Post is a 54 year old who is being evaluated via a billable video visit.      How would you like to obtain your AVS? MyChart  If the video visit is dropped, the invitation should be resent by: Send to e-mail at: [email or web address]  Will anyone else be joining your video visit? No         Follow Up Surgical Weight Loss Supervised Diet Evaluation    Assessment:  Pt. is being seen today for a follow up RD nutritional evaluation. Pt. has been unsuccessful with non-surgical weight loss methods and is interested in bariatric surgery. Today we reviewed current eating habits and level of physical activity, and instructed on the changes that are required for successful bariatric outcomes.    Surgery of interest per pt: RNY.    Workflow review:  Support Group: Completed. (2/20/2024)  Psychology:Completed.  Lab work:Completed.  SWL:No   Quit Smoking date-1/19/2024, nicotine test 4/19/2024    Weight goal: At or below initial.    Anthropometrics:  Pt's current weight: 226 lbs   Initial weight: 231.5 lbs  Weight change: 5.5 lbs   BMI: There is no height or weight on file to calculate BMI.   Ideal body weight: 53.5 kg (118 lb 0.9 oz)  Adjusted ideal body weight: 74.1 kg (163 lb 6.9 oz)    Medical History:  Patient Active Problem List   Diagnosis     Fibromyalgia     Multiple joint pain     Fatigue     Dyslipidemia     Morbid obesity (H)        Progress over past month: Patient reports she has joined a community waking group. Is down 5.5 lbs. Is doing well at weaning from caffeine intake and increase water intake. No concerns at this time.         Diet Recall/Time  Breakfast: protein shake (premiere protein)  Lunch: greek yogurt or cottage  cheese   Dinner: 4.5 oz of protein with vegetables and sometimes a carb    Typical Snacks: SF popsicles      Overnight eating: No     Eating out: ~1-2 time/week (take out salad bar     Meal duration: 20-30 minutes.      fluids by 30 minutes before, during meal, and waiting 30 minutes after meal before drinking fluids: Yes - continues to practice     Beverages  Diet Pop down to 0.5 or 1 can per day  Water 60oz +    Exercise  Walking with the community     PES statement:   Morbid obesity related to excessive energy intake as evidenced by Intake of high caloric density foods/beverages (juice, soda, alcohol) at meals and/or snacks; large portions; frequent grazing; Estimated intake that exceeds estimated daily energy intake; Binge eating patterns; Frequent excessive fast food or restaurant intake; and BMI 40.36       Intervention    Nutrition Education:   Provided general overview of diet and lifestyle modifications needed to be a deemed a safe candidate for bariatric surgery.   Educated patient on how to read a food label: choosing foods with than 10 grams fat and 10 grams sugar per serving to avoid dumping syndrome.  Dumping Syndrome: Described the mechanisms of syndrome, symptoms, and prevention tools from a dietary perspective.   Vitamins: Educated on post-op vitamin regimen including MVI+ 18 mg Fe two times a day, calcium citrate 400-600 mg two times a day, 6615-9787 mcg sublingual B12 daily, 5000 IU vitamin D3 daily.     Food/Nutrient Delivery:  Educated patient on eating three meals, with cutting out snacking.  Bariatric Plate: Patient and I discussed the importance of including a lean protein source (20-30 grams/meal), vegetables (included at lunch and dinner), one serving (15g) of carbohydrate, and limited added fat (1 tb/day) at each meal.   Educated patient on how to complete a food journal and benefits of meal planning.   Educated patient on using a protein powder drink as a meal replacement and/or  supplement after bariatric surgery.   Discussed importance of adequate hydration after surgery, with goal of at least 64 oz of fluids/day.  Addressed avoiding all carbonated, caffeinated and sweetened drinks to prepare for bariatric surgery.     Nutrition Counseling:  Mindful eating techniques: Encouraged slow meal pace, chewing foods to applesauce consistency for 20-30 minutes/meal.   Discussed  fluids 30 minutes before, during, and after meal to prevent dumping syndrome and discomfort post bariatric surgery.   Discussed pre/post operative diet progression, post op vitamin regimen, gave review of surgery process.     Instructions/Goals:     Continue implementing bariatric surgery lifestyle modifications.    Handouts Provided:   [place] surg diet advancement     Monitor/Evaluation:  Pt. s target weight: no gain from initial visit, pt. verbalized understanding.     Plan for next visit:   Pre op diet class      Video-Visit Details    Type of service:  Video Visit    Video Start Time (time video started): 2:05 PM    Video End Time (time video stopped): 2:20 PM    Originating Location (pt. Location): Home    {PROVIDER LOCATION On-site should be selected for visits conducted from your clinic location or adjoining [facility] hospital, academic office, or other nearby [facility] building. Off-site should be selected for all other provider locations, including home:[id number]}  Distant Location (provider location):  Off-site    Mode of Communication:  Video Conference via [facility]    Physician has received verbal consent for a Video Visit from the patient? Yes      Elizabeth Berg RD             Again, thank you for allowing me to participate in the care of your patient.        Sincerely,        Elizabeth Berg RD

## 2024-04-08 NOTE — PROGRESS NOTES
Pinky Post is a 54 year old who is being evaluated via a billable video visit.      How would you like to obtain your AVS? MyChart  If the video visit is dropped, the invitation should be resent by: Send to e-mail at: [email or web address]  Will anyone else be joining your video visit? No         Follow Up Surgical Weight Loss Supervised Diet Evaluation    Assessment:  Pt. is being seen today for a follow up RD nutritional evaluation. Pt. has been unsuccessful with non-surgical weight loss methods and is interested in bariatric surgery. Today we reviewed current eating habits and level of physical activity, and instructed on the changes that are required for successful bariatric outcomes.    Surgery of interest per pt: RNY.    Workflow review:  Support Group: Completed. (2/20/2024)  Psychology:Completed.  Lab work:Completed.  SWL:No   Quit Smoking date-1/19/2024, nicotine test 4/19/2024    Weight goal: At or below initial.    Anthropometrics:  Pt's current weight: 226 lbs   Initial weight: 231.5 lbs  Weight change: 5.5 lbs   BMI: There is no height or weight on file to calculate BMI.   Ideal body weight: 53.5 kg (118 lb 0.9 oz)  Adjusted ideal body weight: 74.1 kg (163 lb 6.9 oz)    Medical History:  Patient Active Problem List   Diagnosis    Fibromyalgia    Multiple joint pain    Fatigue    Dyslipidemia    Morbid obesity (H)        Progress over past month: Patient reports she has joined a community waking group. Is down 5.5 lbs. Is doing well at weaning from caffeine intake and increase water intake. No concerns at this time.         Diet Recall/Time  Breakfast: protein shake (premiere protein)  Lunch: greek yogurt or cottage cheese   Dinner: 4.5 oz of protein with vegetables and sometimes a carb    Typical Snacks: SF popsicles      Overnight eating: No     Eating out: ~1-2 time/week (take out salad bar     Meal duration: 20-30 minutes.      fluids by 30 minutes before, during meal, and waiting 30  minutes after meal before drinking fluids: Yes - continues to practice     Beverages  Diet Pop down to 0.5 or 1 can per day  Water 60oz +    Exercise  Walking with the community     PES statement:   Morbid obesity related to excessive energy intake as evidenced by Intake of high caloric density foods/beverages (juice, soda, alcohol) at meals and/or snacks; large portions; frequent grazing; Estimated intake that exceeds estimated daily energy intake; Binge eating patterns; Frequent excessive fast food or restaurant intake; and BMI 40.36       Intervention    Nutrition Education:   Provided general overview of diet and lifestyle modifications needed to be a deemed a safe candidate for bariatric surgery.   Educated patient on how to read a food label: choosing foods with than 10 grams fat and 10 grams sugar per serving to avoid dumping syndrome.  Dumping Syndrome: Described the mechanisms of syndrome, symptoms, and prevention tools from a dietary perspective.   Vitamins: Educated on post-op vitamin regimen including MVI+ 18 mg Fe two times a day, calcium citrate 400-600 mg two times a day, 9407-9044 mcg sublingual B12 daily, 5000 IU vitamin D3 daily.     Food/Nutrient Delivery:  Educated patient on eating three meals, with cutting out snacking.  Bariatric Plate: Patient and I discussed the importance of including a lean protein source (20-30 grams/meal), vegetables (included at lunch and dinner), one serving (15g) of carbohydrate, and limited added fat (1 tb/day) at each meal.   Educated patient on how to complete a food journal and benefits of meal planning.   Educated patient on using a protein powder drink as a meal replacement and/or supplement after bariatric surgery.   Discussed importance of adequate hydration after surgery, with goal of at least 64 oz of fluids/day.  Addressed avoiding all carbonated, caffeinated and sweetened drinks to prepare for bariatric surgery.     Nutrition Counseling:  Mindful eating  techniques: Encouraged slow meal pace, chewing foods to applesauce consistency for 20-30 minutes/meal.   Discussed  fluids 30 minutes before, during, and after meal to prevent dumping syndrome and discomfort post bariatric surgery.   Discussed pre/post operative diet progression, post op vitamin regimen, gave review of surgery process.     Instructions/Goals:     Continue implementing bariatric surgery lifestyle modifications.    Handouts Provided:   [place] surg diet advancement     Monitor/Evaluation:  Pt. s target weight: no gain from initial visit, pt. verbalized understanding.     Plan for next visit:   Pre op diet class      Video-Visit Details    Type of service:  Video Visit    Video Start Time (time video started): 2:05 PM    Video End Time (time video stopped): 2:20 PM    Originating Location (pt. Location): Home      Distant Location (provider location):  Off-site    Mode of Communication:  Video Conference via [facility]    Physician has received verbal consent for a Video Visit from the patient? Yes      Elizabeth Berg RD

## 2024-04-08 NOTE — TELEPHONE ENCOUNTER
Patient has been cleared by RD and Psych. Tasklist is reviewed and updated.  She will have her nicotine test after 4/19/2024 and if negative, then she will be ready for AB Consult. Orders are already placed.  Sheila Hernández RN

## 2024-04-24 ENCOUNTER — TELEPHONE (OUTPATIENT)
Dept: SURGERY | Facility: CLINIC | Age: 54
End: 2024-04-24
Payer: COMMERCIAL

## 2024-04-24 NOTE — TELEPHONE ENCOUNTER
We have received the results back from her [facility] for nicotine and it's negative.  Also received the mammogram report.  I have called and left a message to call back to schedule her surgical consult

## 2024-04-30 NOTE — PROGRESS NOTES
I was consulted by Pratibha Pastrana to evaluate this pt for Bariatric Surgery.    HPI: Pinky Post is a 54 year old female here today for consideration of metabolic and bariatric surgery.   Weight Loss History Reviewed with Patient   How long have you been overweight? Since early childhood   What is the most that you have ever weighed 236   What is the most weight you have lost? 60   I have tried the following methods to lose weight Watching portions or calories    Exercise    Weight Watchers    Atkins type diet (low carb/high protein)    Yadira Driver    Pre packaged meals ex: [employer]    [employer]    Physician directed program   I have tried the following weight loss medications? (Check all that apply) None      This pt does not have Hypertention.   The pt has GERD which is controlled with meds.   The pt does not have Sleep Apnea.   This pt does not have diabetes.  .    She is interested in having a RNY GBP but she has a history of tobacco abuse.    Allergies:Escitalopram, Gabapentin, and Nortriptyline    Past Medical History:   Diagnosis Date    Anxiety     Depression     Dyslipidemia     Fatigue     Fibromyalgia 01/24/2024    Gastroesophageal reflux disease     Morbid obesity (H)     Morbid obesity (H) 01/24/2024    Multiple joint pain     PONV (postoperative nausea and vomiting)     Tobacco use disorder        Past Surgical History:   Procedure Laterality Date    ABDOMINOPLASTY      BREAST SURGERY      CHOLECYSTECTOMY      CREATION, VAGINAL WALL SLING N/A 9/28/2023    Procedure: TRANSVAGINAL TAPE SLING, HYSTEROSCOPY WITH DILATION AND CURETTAGE WITH ENDOMETRIAL ABLATION;  Surgeon: Nader Deal MD;  Location: [facility]    CYSTOURETHROSCOPY N/A 3/30/2021    Procedure: CYSTOSCOPY WITH BLADDER HYDRODYSTENSION, , PUDENDAL NERVE BLOCK;  Surgeon: Sheila Lewis MD;  Location: [facility];  Service: Gynecology    OVARIAN CYST REMOVAL      TONSILLECTOMY         CURRENT  "MEDS:  No current facility-administered medications for this visit.       No family history on file.     reports that she quit smoking about 3 months ago. Her smoking use included cigarettes. She started smoking about 24 years ago. She has a 24 pack-year smoking history. She has never used smokeless tobacco. She reports that she does not currently use alcohol. She reports that she does not currently use drugs.    Review of Systems - 12 point Review of Systems is negative except for the issues mentioned above.    PSYCHIATRIC: she has undergone a lifestyle assessment and has been deemed a good candidate for bariatric surgery by the psychologist.    Vitals: /68 (BP Location: Right arm)   Ht 1.613 m (5' 3.5\")   Wt 106.5 kg (234 lb 11.2 oz)   BMI 40.92 kg/m    BMI= Body mass index is 40.92 kg/m .     EXAM:  GENERAL: This is a well-developed 54 year old female who appears her stated age  HEAD & NECK: Grossly normal.  No palpable thyroid lesions  CARDIAC: RRR without murmur  CHEST/LUNG:  Clear to auscultation  ABDOMEN: Obese.  Nontender.  No hernias or masses appreciated.  LYMPHATIC:  No significant adenopathy appreciated.    EXTREMITIES: Grossly normal.  No evidence of chronic venous stasis.    NEUROLOGIC: Focally intact  INTEGUMENT: No open lesions or ulcers  PSYCHIATRIC: Normal affect. she has a good grasp on the nature of her obesity and the treatment options.    LABS:  Lab Results   Component Value Date    WBC 9.1 01/24/2024    HGB 12.8 01/24/2024    HCT 39.9 01/24/2024    MCV 92 01/24/2024     01/24/2024       Component  Ref Range & Units 3 mo ago     Hemoglobin A1C  0.0 - 5.6 % 5.4       Component  Ref Range & Units 3 mo ago 7 yr ago 8 yr ago 9 yr ago     Sodium  135 - 145 mmol/L 137      Comment: Reference intervals for this test were updated on 09/26/2023 to more accurately reflect our healthy population. There may be differences in the flagging of prior results with similar values performed with " this method. Interpretation of those prior results can be made in the context of the updated reference intervals.    Potassium  3.4 - 5.3 mmol/L 4.0       Carbon Dioxide (CO2)  22 - 29 mmol/L 23       Anion Gap  7 - 15 mmol/L 10       Urea Nitrogen  6.0 - 20.0 mg/dL 18.8       Creatinine  0.51 - 0.95 mg/dL 0.71 0.70 VC, R, CM 0.72 R, CM 0.69 R, CM    GFR Estimate  >60 mL/min/1.73m2 >90       Calcium  8.6 - 10.0 mg/dL 9.0       Chloride  98 - 107 mmol/L 104       Glucose  70 - 99 mg/dL 98       Alkaline Phosphatase  40 - 150 U/L 67      Comment: Reference intervals for this test were updated on 11/14/2023 to more accurately reflect our healthy population. There may be differences in the flagging of prior results with similar values performed with this method. Interpretation of those prior results can be made in the context of the updated reference intervals.    AST  0 - 45 U/L 25      Comment: Reference intervals for this test were updated on 6/12/2023 to more accurately reflect our healthy population. There may be differences in the flagging of prior results with similar values performed with this method. Interpretation of those prior results can be made in the context of the updated reference intervals.    ALT  0 - 50 U/L 38      Comment: Reference intervals for this test were updated on 6/12/2023 to more accurately reflect our healthy population. There may be differences in the flagging of prior results with similar values performed with this method. Interpretation of those prior results can be made in the context of the updated reference intervals.      Protein Total  6.4 - 8.3 g/dL 6.9       Albumin  3.5 - 5.2 g/dL 4.2       Bilirubin Total  <=1.2 mg/dL <0.2      Resulting Agency [facility]            Assessment/Plan: 54 year old female who is an excellent candidate for bariatric and metabolic surgery.  After a careful conversation with the patient it was decided that a  Laparoscopic Farheen-en-Y Gastric Bypass would be  her best option. She has a history of smoking and we discussed in detail that a RNY-GBP is an ABSOLUTE CONTRAINDICATION for smoking.        I went over the surgery in detail with her.  I went over the nature of the operation and some of the potential consequences of the surgery.  I went over the expected hospital course and discussed laparoscopic versus open surgery, understanding that we will plan on doing this laparoscopically with the possibility of having to convert to an open operation.  I went over some of the risks and complications of the operation including, but not limited to, DVT, pulmonary emboli, pneumonia, postoperative bleeding, wound infection, staple line leak, intra-abdominal sepsis, and possible death.  I also went over some of the potential nutritional concerns such as vitamin B-12, iron, vitamin D, vitamin A, calcium and protein deficiencies.  I will also went over the need for lifelong nutritional surveillance.  The patient understands and wants to proceed with surgery.  We will submit for prior authorization.      Arnold Rizo MD ,MD  [facility] Department of Bariatric Surgery  [phone]

## 2024-05-01 ENCOUNTER — OFFICE VISIT (OUTPATIENT)
Dept: SURGERY | Facility: CLINIC | Age: 54
End: 2024-05-01
Payer: COMMERCIAL

## 2024-05-01 VITALS
SYSTOLIC BLOOD PRESSURE: 122 MMHG | BODY MASS INDEX: 40.07 KG/M2 | DIASTOLIC BLOOD PRESSURE: 68 MMHG | HEIGHT: 64 IN | WEIGHT: 234.7 LBS

## 2024-05-01 DIAGNOSIS — E66.01 OBESITY, CLASS III, BMI 40-49.9 (MORBID OBESITY) (H): Primary | ICD-10-CM

## 2024-05-01 DIAGNOSIS — K21.9 GASTROESOPHAGEAL REFLUX DISEASE WITHOUT ESOPHAGITIS: ICD-10-CM

## 2024-05-01 PROCEDURE — 99204 OFFICE O/P NEW MOD 45 MIN: CPT | Performed by: SURGERY

## 2024-05-01 RX ORDER — ONDANSETRON 2 MG/ML
4 INJECTION INTRAMUSCULAR; INTRAVENOUS
Status: CANCELLED | OUTPATIENT
Start: 2024-06-03

## 2024-05-01 RX ORDER — CEFAZOLIN SODIUM/WATER 2 G/20 ML
2 SYRINGE (ML) INTRAVENOUS
Status: CANCELLED | OUTPATIENT
Start: 2024-06-03

## 2024-05-01 RX ORDER — ACETAMINOPHEN 325 MG/1
975 TABLET ORAL ONCE
Status: CANCELLED | OUTPATIENT
Start: 2024-06-03 | End: 2024-05-01

## 2024-05-01 RX ORDER — HEPARIN SODIUM 5000 [USP'U]/.5ML
5000 INJECTION, SOLUTION INTRAVENOUS; SUBCUTANEOUS ONCE
Status: CANCELLED | OUTPATIENT
Start: 2024-06-03 | End: 2024-05-01

## 2024-05-01 RX ORDER — CEFAZOLIN SODIUM/WATER 2 G/20 ML
2 SYRINGE (ML) INTRAVENOUS SEE ADMIN INSTRUCTIONS
Status: CANCELLED | OUTPATIENT
Start: 2024-06-03

## 2024-05-01 NOTE — LETTER
5/1/2024         RE: Pinky Post  [address]  [place] [address]        Dear Colleague,    Thank you for referring your patient, Pinky Post, to the [facility] [place]. Please see a copy of my visit note below.    I was consulted by Pratibha Pastrana to evaluate this pt for Bariatric Surgery.    HPI: Pinky Post is a 54 year old female here today for consideration of metabolic and bariatric surgery.   Weight Loss History Reviewed with Patient   How long have you been overweight? Since early childhood   What is the most that you have ever weighed 236   What is the most weight you have lost? 60   I have tried the following methods to lose weight Watching portions or calories    Exercise    Weight Watchers    Atkins type diet (low carb/high protein)    Yadira Driver    Pre packaged meals ex: [employer]    [employer]    Physician directed program   I have tried the following weight loss medications? (Check all that apply) None      This pt does not have Hypertention.   The pt has GERD which is controlled with meds.   The pt does not have Sleep Apnea.   This pt does not have diabetes.  .    She is interested in having a RNY GBP but she has a history of tobacco abuse.    Allergies:Escitalopram, Gabapentin, and Nortriptyline    Past Medical History:   Diagnosis Date     Anxiety      Depression      Dyslipidemia      Fatigue      Fibromyalgia 01/24/2024     Gastroesophageal reflux disease      Morbid obesity (H)      Morbid obesity (H) 01/24/2024     Multiple joint pain      PONV (postoperative nausea and vomiting)      Tobacco use disorder        Past Surgical History:   Procedure Laterality Date     ABDOMINOPLASTY       BREAST SURGERY       CHOLECYSTECTOMY       CREATION, VAGINAL WALL SLING N/A 9/28/2023    Procedure: TRANSVAGINAL TAPE SLING, HYSTEROSCOPY WITH DILATION AND CURETTAGE WITH ENDOMETRIAL ABLATION;  Surgeon: Nader Deal MD;   "Location: [facility] OR     CYSTOURETHROSCOPY N/A 3/30/2021    Procedure: CYSTOSCOPY WITH BLADDER HYDRODYSTENSION, , PUDENDAL NERVE BLOCK;  Surgeon: Sheila Lewis MD;  Location: [facility];  Service: Gynecology     OVARIAN CYST REMOVAL       TONSILLECTOMY         CURRENT MEDS:  No current facility-administered medications for this visit.       No family history on file.     reports that she quit smoking about 3 months ago. Her smoking use included cigarettes. She started smoking about 24 years ago. She has a 24 pack-year smoking history. She has never used smokeless tobacco. She reports that she does not currently use alcohol. She reports that she does not currently use drugs.    Review of Systems - 12 point Review of Systems is negative except for the issues mentioned above.    PSYCHIATRIC: she has undergone a lifestyle assessment and has been deemed a good candidate for bariatric surgery by the psychologist.    Vitals: /68 (BP Location: Right arm)   Ht 1.613 m (5' 3.5\")   Wt 106.5 kg (234 lb 11.2 oz)   BMI 40.92 kg/m    BMI= Body mass index is 40.92 kg/m .     EXAM:  GENERAL: This is a well-developed 54 year old female who appears her stated age  HEAD & NECK: Grossly normal.  No palpable thyroid lesions  CARDIAC: RRR without murmur  CHEST/LUNG:  Clear to auscultation  ABDOMEN: Obese.  Nontender.  No hernias or masses appreciated.  LYMPHATIC:  No significant adenopathy appreciated.    EXTREMITIES: Grossly normal.  No evidence of chronic venous stasis.    NEUROLOGIC: Focally intact  INTEGUMENT: No open lesions or ulcers  PSYCHIATRIC: Normal affect. she has a good grasp on the nature of her obesity and the treatment options.    LABS:  Lab Results   Component Value Date    WBC 9.1 01/24/2024    HGB 12.8 01/24/2024    HCT 39.9 01/24/2024    MCV 92 01/24/2024     01/24/2024       Component  Ref Range & Units 3 mo ago     Hemoglobin A1C  0.0 - 5.6 % 5.4       Component  Ref Range " & Units 3 mo ago 7 yr ago 8 yr ago 9 yr ago     Sodium  135 - 145 mmol/L 137      Comment: Reference intervals for this test were updated on 09/26/2023 to more accurately reflect our healthy population. There may be differences in the flagging of prior results with similar values performed with this method. Interpretation of those prior results can be made in the context of the updated reference intervals.    Potassium  3.4 - 5.3 mmol/L 4.0       Carbon Dioxide (CO2)  22 - 29 mmol/L 23       Anion Gap  7 - 15 mmol/L 10       Urea Nitrogen  6.0 - 20.0 mg/dL 18.8       Creatinine  0.51 - 0.95 mg/dL 0.71 0.70 VC, R, CM 0.72 R, CM 0.69 R, CM    GFR Estimate  >60 mL/min/1.73m2 >90       Calcium  8.6 - 10.0 mg/dL 9.0       Chloride  98 - 107 mmol/L 104       Glucose  70 - 99 mg/dL 98       Alkaline Phosphatase  40 - 150 U/L 67      Comment: Reference intervals for this test were updated on 11/14/2023 to more accurately reflect our healthy population. There may be differences in the flagging of prior results with similar values performed with this method. Interpretation of those prior results can be made in the context of the updated reference intervals.    AST  0 - 45 U/L 25      Comment: Reference intervals for this test were updated on 6/12/2023 to more accurately reflect our healthy population. There may be differences in the flagging of prior results with similar values performed with this method. Interpretation of those prior results can be made in the context of the updated reference intervals.    ALT  0 - 50 U/L 38      Comment: Reference intervals for this test were updated on 6/12/2023 to more accurately reflect our healthy population. There may be differences in the flagging of prior results with similar values performed with this method. Interpretation of those prior results can be made in the context of the updated reference intervals.      Protein Total  6.4 - 8.3 g/dL 6.9       Albumin  3.5 - 5.2 g/dL 4.2        Bilirubin Total  <=1.2 mg/dL <0.2      Resulting Agency [facility]            Assessment/Plan: 54 year old female who is an excellent candidate for bariatric and metabolic surgery.  After a careful conversation with the patient it was decided that a  Laparoscopic Farheen-en-Y Gastric Bypass would be her best option. She has a history of smoking and we discussed in detail that a RNY-GBP is an ABSOLUTE CONTRAINDICATION for smoking.        I went over the surgery in detail with her.  I went over the nature of the operation and some of the potential consequences of the surgery.  I went over the expected hospital course and discussed laparoscopic versus open surgery, understanding that we will plan on doing this laparoscopically with the possibility of having to convert to an open operation.  I went over some of the risks and complications of the operation including, but not limited to, DVT, pulmonary emboli, pneumonia, postoperative bleeding, wound infection, staple line leak, intra-abdominal sepsis, and possible death.  I also went over some of the potential nutritional concerns such as vitamin B-12, iron, vitamin D, vitamin A, calcium and protein deficiencies.  I will also went over the need for lifelong nutritional surveillance.  The patient understands and wants to proceed with surgery.  We will submit for prior authorization.      Arnold Rizo MD ,MD  [facility] Department of Bariatric Surgery  [phone]     [facility] Informed Consent for Bariatric Surgery from the clinic was given to the patient, reviewed, signed and sent for scanning.         Again, thank you for allowing me to participate in the care of your patient.        Sincerely,        Arnold Rizo MD

## 2024-05-01 NOTE — PROGRESS NOTES
Welcome to [facility]!      We are grateful that you have chosen us to partner with you on your journey to better health!  We have included some very important information for you to read as you begin your journey towards weight loss surgery. We will discuss parts of this as you move closer to surgery but please reach out if you have any questions or concerns.      Please click on the following links and they will lead you to a printable version of each handout or copy into your browser to view/print at home:    Making Your Decision, Understanding Weight Loss Surgery  [email or web address]    A Roadmap to Your Weight Loss Surgery  [email or web address]    Honoring Choices - Your Rights  [email or web address]    Honoring Choices - [place] Health Care Directive (form that can be filled out)  [email or web address]      Insurance Coverage and Approval for Surgery  Every insurance plan is different.  Many companies approve benefits for surgery, but many have specific requirements that must be completed prior to being approved.    Verification of benefits is the patient's responsibility.  You will be responsible for any charges not covered by your insurance plan - including, but not limited to copays, deductible, out of pocket, any amount over your cap limit, etc.  Using the guideline below, please contact your insurance plan (we recommend you call the Customer Service number on the back of your card).      Once all of the requirements for surgery are complete, you will see the surgeon.  Following this visit, we will submit your information to your insurance plan for Prior Authorization.  We strongly encourage you to submit any documentation that supports your weight loss attempts to us.    Questions that are important to ask your insurance company when you call them:    Are [facility] in my network?  Is bariatric surgery  a covered benefit under my policy?  If so, what is my estimated out of pocket expense?  Are there any exclusions or cap limits on my plan for bariatric surgery?  If so, what are they?  What are the criteria necessary to be approved for bariatric surgery?  Do you require medically supervised weight loss visits for approval of surgery?  If yes, for how many months?  Within the last # of years?  Are the following procedures a covered benefit under my plan?  Laparoscopic Gastric Bypass (CPT Code 94311)  Laparoscopic Sleeve Gastrectomy (CPT Code 34155)  Nutrition/Dietitian Consult (CPT Code 42185  Nutrition/Dietitian Reassessment (CPT Code 42457  Psychological Assessment (CPT Codes 55412 & 97884      Initial labs for Bariatric Surgery    Some lab orders were placed by your doctor in the [employer] system and can be drawn at any of our outpatient hospital labs or your clinic. If you do them at one of three hospitals ([facility] in [place], [facility] in [place], [facility] in [place]) you do not need an appointment but if you'd like to do them at a clinic, you will need to schedule an appointment with that clinic.       You will need to be fasting 10-12 hours prior (you can continue to drink water) and should have them drawn sooner verses later so if any corrections need to be made we will have time to address them.      [facility] lab is open 7 am - 4:30 pm Monday through Friday and 9am-12:30 pm on Saturdays.  [facility]'s lab is open 7 am -4:30 pm Monday through Friday and 8am to 11:30am on Saturday and Sundays.     If you would like them done at a clinic outside the [facility] [employer] system, then we will need to know where to fax the orders.   If you have any questions or would like help scheduling a lab appointment at the [facility], please give us a call on the Bariatric Nurse Line at [phone].        [facility] [facility] for Bariatric Surgery  Eat 3 nutrient-rich meals each day      Don't skip meals--it will cause you to overeat later in the day! Space meals 4-6 hours apart    Eat around the same times each day to develop a routine eating schedule    Avoid snacking unless physically hungry.   Planned snacks: 1-2 times per day and no more than 150 calories    Eating protein and fiber (vegetables/fruits/whole grains) with meals helps you stay full     Choose foods with less than 10 grams of sugar and 5-10 grams of fat per serving to prevent excess calories and weight gain  Eat protein first    Protein helps with healing, maintaining muscle mass and good nutrition, and reducing hunger     For RNY Gastric Bypass, Sleeve Gastrectomy, and Duodenal Switch: aim for 60-80 grams of protein per day    Eat protein first, then veggies and the fruits or grains  Stop drinking 15-30 minutes before meals and wait 30-45 minutes after eating to drink    Drinking too soon before a meal may cause you to be too full to eat    Drinking too soon after you eat will cause the food to  wash  through your new stomach too quickly--leaving you hungry and likely to eat more    Eat [id number]    Take 20-30 minutes to eat each meal by taking small bites, chewing foods to applesauce consistency or 20-30 times before you swallow    Not chewing food properly can block the opening of your pouch--causing pain, nausea, vomiting    Eating foods too fast can delay those full signals and increase overeating   Slow down your eating by smaller plates/bowls, toddler utensils, putting your fork/spoon down between bites and not watching TV/computer during meals!  Make a list of activities to prevent boredom, stress and emotional eating    Go for a walk or lift weights while watching your favorite TV show    Talk to a co-worker or email/call a friend     Keep your hands and mind busy with woodworking, puzzles, knitting or painting your nails    Practice deep breathing or meditation  Drink 64 ounces of 0-Calorie drinks between meals      Water    Zero calorie Propel , Vitamin Water Zero  or [employer] , Crystal Light , Sugar-Free Nir-Aid , and other sugar-free lemonade or flavored quintero    Decaffeinated, unsweetened coffee/ tea (1 cup or less per day)   Avoid Caffeine and Carbonation- NO STRAWS    Caffeine can irritate your new pouch, increase risk for ulcers, and can increase your appetite      Carbonation can lead to increased bloating/gas and discomfort   Work up to a total of 30-60 minutes of physical activity most days of the week    Helps with losing weight and preventing weight regain after surgery     Do a combination of cardio (walking/water exercises/biking/swimming) and strength training  Take daily vitamin/mineral supplements after surgery    Daily use of vitamins/minerals is necessary for the rest of your life      Psychological Evaluation for Bariatric Surgery      Why do I need a psychological evaluation before bariatric surgery?     The psychologist's main purpose is to provide the support and education to ensure you have the most successful outcome after surgery.   Preparing for bariatric surgery often involves changing behavior patterns. Working on these changes before surgery allows you to achieve the best results after surgery as some of these behaviors have been entrenched for many years. In addition, your relationship with food may need to change. Psychologists are experts in behavior change and are there to guide and support you as you make these important changes.   A significant life change, like losing a lot of weight, may affect many areas of your life--self-concept, physical activity and relationships with others. Your psychologist will help you prepare to adjust to these changes in a healthy way.   If you have mental health symptoms, relationship struggles, or other challenges, your psychologist will suggest how to best address these concerns so that they do not interfere with your progress toward a healthier  lifestyle.       Is the psychologist looking for reasons to say I can't have surgery?   The goal is to not find specific psychological problems. Instead, the psychologist will be working with your strengths to ensure you have the most optimal outcome after surgery.  There is no expectation that you will have everything figured out already or that you must have  perfect  behaviors before moving forward with surgery. Your psychologist is expecting that you will have some behavior changes that will need to occur concurrent with the surgery.   You can feel comfortable that the psychologist is not there to    you or your habits. The psychologist is there to provide support and encourage your progress.      What should I expect during the evaluation?   During the interview, which could take place over 2 or more sessions, the psychologist will ask about:   -weight history, current eating pattern and fluid intake, and previous attempts at weight loss   -activity level   -psychiatric history  -drug, alcohol and nicotine use   -social and developmental history  -support system   -current stressors and coping mechanisms   -understanding of the risks of surgery   -expectations for outcomes after surgery   You will complete various questionnaires that will focus on coping strategies, eating behaviors, quality of life and adverse childhood experiences in order to provide additional information to inform the assessment.   Your psychologist will likely give you some  homework assignments  to practice as you prepare for surgery. This will be individually tailored to your specific needs.   Your psychologist will talk with you about some of the typical challenges that patients might encounter after surgery and how to prepare for these.   A final report will be generated and any treatment recommendations will be discussed with you and the bariatric team to determine next steps.   If you would like, you may have any support people  (e.g., spouse) join a session of the evaluation to provide additional information.       Bariatric surgery offers a physical  tool  for weight management. Similarly, your work with the psychologist will provide you with some additional emotional and behavioral  tools  as you work toward your healthier lifestyle goals.      Support Group    Support and accountability is an important part of your weight loss journey and maintenance once you reach your health goals.  Because of this, we require that you attend at least one of our support groups before you meet with the surgeon so you get a feel for what they are like and hopefully establish a connection to others who are going through a similar process or have had surgery before so you can ask your questions.    We currently have two options for support group but they are in the virtual format only at this time.  Both groups are using Microsoft Teams for their platform and you can access it through the web or an javi that can be downloaded to a smart phone if you have one.      The Pre- and Post-op Connections Group is on the third Tuesday of the month from 6:30-8pm and is hosted by Zeb Arriaga, PhD.  If you are interested in this group, you will need to email him at [email or web address] each month and then he will in turn send you the invitation to join.      We also have a Post-op focused Connections Group the 4th Thursday of the month from 11am-12pm that is mostly geared toward post-operative patients who are past three months post-op.  This group is hosted by ELIZABETH Chen, CBN, CIC and you can email her to join the group at [email or web address] each month and she will send you an invite similar to Zeb's group.  If you decide you would like to be a regular attender at this group, we can add you to an automatic invitation list of people.    You can see more information about available support groups that the [facility] offers to our patients  as well by following the link:    The following Support Group information can also be found on our website:  [email or web address]      Please let us know if you have any questions about all the information above and we will be talking more about this during future visits.     Thank you,   [facility]

## 2024-05-01 NOTE — PROGRESS NOTES
[facility] Informed Consent for Bariatric Surgery from the clinic was given to the patient, reviewed, signed and sent for scanning.

## 2024-05-03 ENCOUNTER — DOCUMENTATION ONLY (OUTPATIENT)
Dept: SURGERY | Facility: CLINIC | Age: 54
End: 2024-05-03
Payer: COMMERCIAL

## 2024-05-03 NOTE — TELEPHONE ENCOUNTER
Tasklist updated.     Serenity Miller RN, CBN  [facility]  P [phone]  F [phone]

## 2024-05-14 ENCOUNTER — DOCUMENTATION ONLY (OUTPATIENT)
Dept: SURGERY | Facility: CLINIC | Age: 54
End: 2024-05-14
Payer: COMMERCIAL

## 2024-05-14 RX ORDER — MULTIVIT-MIN/FOLIC/VIT K/LYCOP 400-300MCG
1 TABLET ORAL 2 TIMES DAILY
Qty: 180 TABLET | Refills: 3 | Status: SHIPPED | OUTPATIENT
Start: 2024-06-04

## 2024-05-14 NOTE — PATIENT INSTRUCTIONS
Patient name: Pinky Post  Surgery:  Farheen-En-Y Gastric Bypass  Surgery Date:  6/3/2024  Surgery Time: 7:20am (*This time is just a tentative time and may end up changing.*)  Arrival Time to Hospital: 5:20 am (two hours prior to surgery time)  Surgeon: Arnold Rizo MD       MONTH BEFORE SURGERY    Schedule and have Pre-operative History and Physical with your primary care in addition to the labs, CXR and EKG.  These should be completed within a month of surgery and no closer than 5 days.  ALL of the following tests must be completed per anesthesia and your surgeon prior to your surgery:    Chest Xray  EKG  Complete Blood Count with Differential  Complete Metabolic Panel  INR  APTT(PTT)  Urine Analysis (UA) with Urine Culture if UA abnormal  Hemaglobin A1c if hasn't been done in the last 3 months AND you are diabetic.  Urine Cotinine with Metabolites if you quit smoking within the last 6 months.    * COVID testing- is no longer required prior to your surgery.  However, please let us know if you aren't feeling well or have tested positive for Covid-19 between now and your surgery date. Let us know if you have any questions regarding this.      2 WEEKS BEFORE SURGERY    Start Preoperative Liquid Diet per dietitian instructions      WEEK BEFORE SURGERY CHECKLIST       Continue Full Liquid Diet per dietitian instructions    2.   Purchase diet components for after surgery      3.   Arrange for someone to stay with you the first 1-2 nights you return home.     4.   Arrange for a ride home from the hospital.  We can't give an exact time for  because it depends on how you are doing with your drinking and pain control.  Most people are reaching their goals for discharge by lunch time and you will need to have a ride ready and waiting by 11 am.    5.   Do your grocery/vitamin shopping for before AND after surgery.    6.   Make sure you have a bowel movement if you haven t gone in a while. There is no need for a  bowel prep before surgery.       7.   Make sure you have picked up the prescriptions/supplements that were sent to your pharmacy - [id number] - [place], [place] - [address] AT [facility] [address] [[id number]]      NEW PRESCRIPTIONS:  In preparation for surgery, we have prescribed some medication that you will need to  as soon as possible     A. Vitamins: Chewable Multivitamin and Vitamin B12 (if you weren't taking already)  - You can start taking the Multivitamin and B12 as soon as you pick this up from the pharmacy.  Some insurance companies will not cover the vitamins because they are available over the counter, so in those cases you will need to purchase them yourselves.  Do not take the morning of surgery.    B. Acid Reducer:  Omeprazole (Prilosec) - If not already taking, you will get a prescription to start when you get home from the hospital.  Tablets cannot be cut or crushed.  If a capsule, entire capsule contents may be sprinkled on a spoonful of applesauce and taken immediately without chewing.  If only on a full liquid diet, dump capsule contents into a spoonful of liquid and take without chewing.  May swallow whole again starting 6 weeks after surgery but can try swallowing sooner if having issues with opening into food.  Continue after surgery for at least 3 months even without symptoms of acid reflux. Do not take the morning of surgery.     C. Gallstone Reduction: Actigall (if needed) - Start two weeks after surgery.  Swallow whole with warm water, do not cut, crush, or open capsule up.  This is a medication that will help to prevent gallstones from forming during the first 6 months post-op of rapid weight loss.      BEFORE Surgery Medication Considerations:  Certain medications may need to be stopped before major surgery. Some medications may increase your risk of bleeding, others may change the way your blood clots, and other medications can affect your lab work. The bariatric  clinic will give you specific instructions about when to stop these medications.    This timeline shows when certain medications should be stopped before surgery: This is a general timeline, if your bariatric nurse or surgeon gives you other instructions, follow those specific instructions.    30 Days (One Month) Before Surgery  Birth control pills & patches that contain estrogen  You must use alternative forms of contraception  Hormone replacement therapy   Premarin  Testosterone  14 Days (Two Weeks) Before Surgery  Appetite control medications   Phentermine  Other: __________  Diuretics ( water pills )   Talk to your primary doctor.  You may need an alternative medication,  Hydrochlorothiazide (HCTZ)  Furosemide (Lasix )  Bumetanide (Bumex )  Spironolactone (Aldactone )  Chlorthalidone  Any blood pressure medication that is combined with a diuretic  Herbal supplements  Fish oil or Omega 3   Homeopathic remedies  7 Days (One Week) Before Surgery  Anti-platelet medications and medications that help to prevent blood clots:  Clopidogrel (Plavix ), Prasugrel (Effient ), Ticagrelor (Brilinta )  Aspirin/Dipyridamole (Aggrenox ), Dipyridamole (Persantine )  Cilostazol (Pletal )  All Non-Steroidal Anti-Inflammatory Drugs (NSAIDs):   Non-prescription: Ibuprofen (Advil , Motrin , Nuprin ), Naproxen (Naprosyn , Aleve , Anaprox ),   Prescription: Piroxicam (Feldene ), Sulindac (Clinoril ), Tolmentin (Tolectin ), Celecoxib (Celebrex ), Diclofenac (Voltaren ), Indomethacin (Indocin ), Nambumetone (Relafen ), Flurbiprofen (Ansaid ), Ketoprofen (Orudis ), Etodolac (Lodine ), Meloxicam (Mobic ), Oxaprozin (Daypro )  Aspirin (including low-dose (81mg) and chewable  baby aspirin )  Warfarin (Coumadin , Jantoven )  When warfarin is restarted (usually 24-48 hrs after surgery), dosing and INR monitoring will be managed by the Bariatric Clinic for 4-6 weeks after your surgery date.   The Day Before Surgery  Diabetes medications: Follow  the instructions on the  Diabetes Management for the Bariatric Surgery Patient  form.  The Day of Surgery  Diabetes medications: Follow the instructions on the  Diabetes Management for the Bariatric Surgery Patient  form.      HOME MEDICATION RECOMMENDATIONS:  Below you will see your specific medication instructions.  Please share this information with your primary care so they can make adjustments to your medications if necessary.    Acetaminophen (Brand Name: Tylenol or MPAP)  You will likely be sent home on Tylenol to go with your other pain medications after surgery. It is ok to cut/crush regular or extra strength tablets. Make sure tablet is not long acting or extended release. Do not take more than 3000mg in 24 hours. If you decide to use liquid Tylenol at home, we recommend you use Adult strength because you will have to take less liquid to get the same effect but it can be harder to find in the stores and might have an easier time ordering it online prior to your surgery. Dilute the medication 1:1 with water before taking the liquid version to prevent dumping or stomach upset.    Bupropion XL or SR (Brand Name: Wellbutrin XL or Budeprion SR)  Should not be cut or crushed. Talk with primary care or mental health provider about switching to regular release tablet which may be cut or crushed. This medication may not be absorbed as well after bariatric surgery (especially the extended release tablet). Watch for changes in symptom control and talk with your provider if you notice any changes in how well this medication is working. Ok to take as scheduled the morning of surgery with a sip of water.    Clonazepam (Brand Name: Klonopin)  Small enough to swallow whole. Ok to take as scheduled the morning of surgery with a sip of water.    Fluticasone nasal spray (Brand Name: Flonase)  Ok to use. You are encouraged to bring to the hospital on the day of surgery.    Hydroxyzine HCL (Brand Name: Atarax)  Do not cut,  crush, or chew extended release medication.    Ibuprofen ((Brand Name: Advil, Motrin)  Stop 7 days before date of surgery. Avoid completely after surgery.    Pantoprazole (Brand Name: Protonix)  Tablets cannot be cut or crushed. We will change you over to Omeprazole (Prilosec) after surgery for 3 months. If you would prefer to return to your Protonix 6 weeks after surgery when you can swallow pills whole, that is okay too. Hopefully you won t need either after 3 months post-op.    Venlafaxine XR (Brand Name: Effexor XR)  You may open capsule and pour the medicine into a small amount of soft food such as pudding, yogurt, or applesauce. Stir this mixture well and swallow it without chewing. If only on a full liquid diet, dump capsule contents into a spoonful of liquid and take without chewing. If using the extended release tablet, do not cut or crush, and talk with prescribing provider about switching to a different formulation. This medication may not be absorbed as well after bariatric surgery. Watch for changes in symptom  control. Talk with primary care or mental health provider if you notice any changes in how well this medication is working after surgery. May swallow whole again starting 6 weeks after surgery. Take as directed the morning of surgery with a sip of water.    Packing for the Hospital  When packing for the hospital, anticipate staying overnight. Make a checklist so that you don't forget things you really want to have with you.    Bring a folder with your printed patient handouts to keep handy and add discharge paperwork to if you want.  Bring your insurance card.  Bring a current medication list OR update list in [employer] (including vitamins, over-the-counter medication, eye drops, inhalers, patches, ointments and herbal products). Include the name, correct dose and the times you take them each day. List all allergies.  If you use a CPAP or BIPAP, bring it with you.  You may also want to bring a  "water bottle of the water you use in your machine.  Bring a copy of your health care or advanced directive document if you have one.  You may bring your own gown/kyler and talon if you don't want to wear the hospital-supplied items once IV is disconnected.  Slippers or shoes should have a non-slip sole.  You may bring your own personal care items such as toothbrush, toothpaste, shampoo, denture cleanser, comb, skin care products, deodorant, make-up, and hair dryer.  If you wear a hearing aid, bring a labeled storage container and extra batteries.  If you wear glasses or contacts, bring a labeled case and saline for contacts. Do not plan to wear contact lenses the day of surgery. It is best to bring your glasses so that you can wear them in the recovery room. You cannot wear contact lenses during surgery.  Bring loose-fitting clothing to wear home. Bring telephone numbers (including work numbers) of family and friends.  You may want to bring a journal to document your thoughts and feelings.  You may not wear any jewelry on or in any area of your body to the operating room.   Leave money, jewelry, or any valuables at home.        DAY BEFORE SURGERY CHECKLIST      Clear liquid diet until 4 hours prior to your surgery     2.  Nothing to eat or drink 4 hours prior to your surgery time.       3.  Pack a couple preferred protein shakes to have available in the hospital -See approved list of liquids from dietitian      4.  Hibiclens or Exidine shower. Use a fresh, clean towel to dry off. See \"Showering Before Surgery handout\" below.       MORNING OF SURGERY CHECKLIST      Hibiclens or Exidine shower. Use a fresh, clean towel to dry off. See \"Showering Before Surgery handout\" below.     2.   Remove all jewelry and piercings.      3.   Take any medications you have been advised to take the morning of surgery with a sip of water. (See med list above)     4.   Arrive 2 hours prior to your scheduled surgery time.    Showering " Before Surgery  [email or web address]     Preparing Your Skin  [email or web address]    HOSPITAL STAY    Park in the [facility] Visitor [facility]ing Lot in [place] and check in at the information desk where they will escort you to the M[facility].     You will be allowed to have 2 support people with you in the pre-operative area at the hospital.  After surgery on the recovery unit, you can have up to 4 visitors and they must leave when visitor hours are over.     Meet your surgical team which includes an RN, CRNA, anesthesiologist and your surgeon.    IV will be started for fluid management, pain medication and possible antibiotics.    Anticoagulant therapy (blood thinner) will be given and continued until you are discharged.    Pneumatic compression stockings will be placed on your legs before surgery to help prevent blood clots. You can also move your feet up and down frequently to aid in circulation.    An incentive spirometer will be used to promote good lung expansion and prevent pneumonia. This must be within arm's reach of you, and you should be doing it ten times every hour while awake.      PAIN MEDICATION IN THE HOSPITAL  Treating pain and discomfort is important to your recovery.  Your surgeon will order pain medication for you in the hospital.  You will also get a take home prescription for pain medication after surgery.  Our goal is not pain free, but an acceptable level of discomfort; you should be able to move without pain limiting your activity    In the Preoperative area, you will receive additional pain treatment   Intrathecal Spinae Block (Duramorph)- which is an ultrasound guided injection for pain medication   Ketorolac (Toradol )  This is an anti-inflammatory medication used to treat swelling and moderate pain  It is used only for 24 hours after surgery to decrease inflammation and pain  Given through your IV  Takes about 30 minutes to take effect  Common side effects:  nausea, upset stomach  If your pain is not well controlled with this, you may be given a narcotic pain medication in addition to ketorolac    In Recovery, you will receive additional IV pain medication as needed and then you will be switched over to things you can take by mouth in preparation for going home.   Fentanyl   Hydromorphone (Dilaudid )    Common side effects: sleepiness, dizziness, upset stomach, constipation   Narcotic medications can alter your judgment, coordination and reaction time.  Do NOT drive or do anything that requires clear thinking and coordination until you have been completely off narcotic medications for at least 24 hours.    Use only to treat moderate to severe pain.  Ultram (Tramadol)  This is a non-narcotic prescription pain medication used for moderate to severe pain if needed.  Immediate release tablets can be cut or crushed.  Tramadol can cause or worsen seizures. Your risk of seizures is higher if you're taking other certain medications. These drugs include other opioid pain drugs or certain medications for depression, other mood disorders, or psychosis.  Tramadol oral tablet may cause drowsiness. You should not drive, use heavy machinery, or perform any dangerous activities until you know how this drug affects you.  Acetaminophen (Tylenol)   This will be giving by IV to start and then by mouth once you are ready  Use for mild pain or discomfort  Available without a prescription and comes in liquid, tablets, capsules and powder.  The adult strength powder packs can be helpful right after surgery and can be found online.  Maximum daily dosage: 3,000mg per 24 hours  Works best as a scheduled dose for the first three days once you get home in addition to the gabapentin as the inflammation goes down  Gabapentin  Used together with the acetaminophen to provide pain relief.  Can be taken every 8 hours as needed  The medication works by calming the nerve cells that transmit pain signals to  "the brain.  This is best in liquid form right after surgery but can have a unpleasant taste.    * Important Reminder: Do NOT take NSAID or aspirin medications that are available without a prescription (examples: Ibuprofen, Motrin , Advil , Aleve , Naproxen)      OPERATING ROOM    Hover Mat will be used to move you from one surface to another.    Charleen Hugger Gowns/Blankets are used to keep you warm.    Urinary catheters are not usually needed.    Surgery takes 45 minutes-3 hours depending on the procedure.    *Remember: How you go to sleep tends to be how you wake up - so pleasant thoughts are important!    RECOVERY ROOM    The surgeon will give an update to your family or support people as you are on your way to the recovery room.    You will wake up with a blood pressure cuff, oxygen and pulse monitor (pulse oximeter).    Frequent vital signs.    Pain Scale.      HOSPITAL ROOM    You will stay overnight on Patient Care Unit P2.    You will have a private room.    [facility] has been awarded an \"American College of Surgeons Center of Excellence\" partnered with the American Society for Metabolic and Bariatric Surgery and has a proven track records for quality care and good outcomes.     The staff has specialty training in the care of weight-loss surgery patients.       WHAT TO EXPECT AFTER SURGERY    You will start walking the day of surgery and continue several times a day, especially once you get home.    Drinking and eating will follow plan discussed with the dietitian.    Shower in the hospital.    Incentive Spirometer use and deep breathing/cough    Splinting your incision and wearing the abdominal binder when moving may help with discomfort.    Discharge the day after surgery is expected for laparoscopic procedures after you are seen by your surgeon, nurse practitioner or physician s assistant, anesthesia, and possibly a pharmacist.    A nurse from the clinic will call you within 3 days after " discharge.      ONCE YOU ARE HOME CHECKLIST      Continue your liquid intake daily and track     Oral intake:      12-4pm         4pm-8pm         8pm-Midnight         6am-10am       2.   Walks      12-4pm      4pm-8pm      8pm-Midnight      6am-10am       3.   Incentive Spirometer/ Deep Breaths and Coughing      ACTIVITY AFTER SURGERY    Walking several times a day, increasing endurance and energy level over time.    No lifting over 20 lbs. for the first 2 weeks (6 weeks for open procedure) and then let your body be your guide.    Can be the    in terms of chores at home.    Pushing and pulling uses the same core muscles and those activities may cause pain or discomfort.    Do each exercise 10-15 times, twice a day and increase weight when you can consistently do 12 repetitions of activity.    No swimming, bathing, or hot tub use until incisions are completely healed (scars).    Do not plan to fly or take a road trip within 1 to 3 months after surgery.    See handouts below for exercises that can be done after surgery.    Seated Exercises for Arms and Legs (can be done before or after surgery)  [email or web address]    Exercise Guidelines after Weight Loss Surgery (1st 4-6 weeks)  [email or web address]    Exercises after Weight Loss Surgery (strengthening, when no weight lifting restrictions - after 4-6 weeks)  [email or web address]      MEDICATIONS AFTER SURGERY    Here is a simple graph that might help makes things more clear for when to start certain medications after surgery.  (Zofran, Tylenol, Gabapentin and Hyoscyamine will be sent home with you from the hospital as needed)    Medication Dose When to Start   Vitamin B12  1000 mcg Once a day under the tongue (sublingual), weekly nasal spray, or monthly injections Day after surgery   Chewable Multivitamin with   18 mg Iron  (Must also contain Vitamin A and Zinc)  NO GUMMIES 1 tablet twice daily Day after surgery    Omeprazole  20 mg 1 capsule daily - open and sprinkle on food or swallow with fluid Day after surgery. Take even if no acid reflux symptoms.   Zofran 4 mg  (if ordered) 4mg tablet every 8 hours as needed for nausea As needed after surgery   Tylenol 1000 mg every 6 hours for three days after surgery.  After first three days after surgery, switch to as needed and do not take more than 3000mg daily Once you get home   (you will be sent home from the hospital with this)   Hyoscyamine  (if ordered) 0.125 mg tablet every 4 hours as needed for stomach spasm pain As needed after surgery   Liquid Gabapentin  Must be kept in fridge   250 mg liquid 3 times a day for at least 3 days after surgery Once you get home   (you will be sent home from the hospital with this)   Actigall (Ursodiol)- 300 mg for gallbladder if you still have Twice daily - swallow whole with warm water Two weeks after surgery   Chewable Calcium Citrate 2 tablets twice daily Three weeks after surgery   Vitamin D - (125 mcg)  5000 units 1 daily Three weeks after surgery        LIFELONG VITAMINS:    First 3 Months after Surgery  Choose chewable, liquid or crushable forms of Multivitamin and Calcium Citrate and then you can switch to tablets you can swallow whole if you would like.    1.)   Sublingual Vitamin B12: Take 7703-7152 mcg 1 time daily    Also available in injectable form monthly.  Discuss with provider if interested.  2.)   Multivitamin with Iron: Take 1 multivitamin 2 times daily  Needs 18 mg of IRON per dose along with Vitamin A and Zinc in them  Generic Children's Chewable multivitamin with iron (Equate, Up & Up brand, etc.)  Centrum   Chewable  Centrum, Women's One-A-Day or Generic (after 3 months)  NO GUMMY Vitamins (these do not contain iron)  3.)   Calcium Citrate with Vitamin D: Take 400-600 mg 2 times daily (Start 3 weeks after surgery)  Bariatric Advantage: Citrate Lozenges (500mg)--2 Daily, or Chewy Bites (250 mg)--4  Daily  www.bariatricNsGene.Predilytics  or 1-713.360.5570  Celebrate Calcium: Calcium Plus 500 (500mg)--2 Daily, or Calcet Creamy Bites (500 mg)--2 Daily, or Calcium Citrate Chews (250mg) - 4 Daily   www.celebratevitamins.[employer]ilytics  or 1-530.462.2685  UNJURY Opurity: Calcium Citrate Plus (300mg)--3 Daily  www.[employer]  or 1-589.930.3766  Up-Ranulfo D: Nutrition Direct: Flavorless Calcium Powder (500 mg with 250 IU Vitamin D)  www.amazon.com  or 1-954.958.4039--3 Scoops Daily  Wellesse Liquid Calcium Citrate--1 Tbsp.  (500 mg)--2 Times Daily  [facility]  After 3 months post op:  Citracal Petites (or Generic version) (200mg) - 4 daily  Any grocery store or Pharmacy  4.)   Vitamin D3 : Take 5000 IU Daily (Start 3 weeks after surgery)  This can remain a small gel cap    AFTER Surgery Medication Considerations:    The 3 main ideas:  Cut or crush all meds for 6 weeks after surgery  Long acting medications are not the best option anymore  Avoid NSAID medications for the rest of your life    1.  Cutting or crushing meds:  Before surgery, tablet size does not matter.  After surgery there will be swelling around your new stomach pouch or sleeve.  Therefore, it is important to limit the size of medications for the first six weeks after surgery.      What this means for you:  For the first six weeks after surgery, you will need to cut or crush tablets that are larger than   inch (about the size of an eraser on a standard pencil)  Some capsules may be opened and the contents sprinkled onto soft food.  Once sprinkled on food, eat immediately being careful not to chew.    You will be given a pill cutter at the hospital  Refer to your medication list. This list will tell you which medications can be cut or crushed, and which capsules can be opened.     * Important Reminder: Discuss ALL of your medications with your primary care provider.    Your pre-op history and physical appointment is a good time to review your current medications.      2.  Long acting medications are not the best option anymore  Many types of bariatric surgery involve removing a portion of the small intestine. Long acting or extended release medications release slowly throughout the entire small intestine. Because your surgery has changed your stomach and/or intestine, you may not get the full effect of the long acting medication.  Short acting medications may work better for you after surgery. Talk with your doctor at your pre-op history and physical appointment if you are taking long acting medications. Your doctor can change prescriptions for long acting medications to short acting.   3.  Avoid NSAIDs for the rest of your life  NSAIDs are Non-Steroidal Anti-Inflammatory Drugs  The NSAID class of medications is used to relieve minor aches, pains or to treat fever. They are commonly used to treat pain caused by a cold, flu, sore throat, headache, and arthritis.  Some NSAIDs are available over-the-counter while others need a prescription from your doctor.     NSAIDs should be avoided after bariatric surgery because they can cause stomach ulcers, scarring or bleeding.  You will not be able to take any NSAID mediation for the rest of your life after bariatric surgery.  Below is a list of common NSAID medications:    OVER-THE-COUNTER NSAIDs    Ibuprofen  - Brand names Advil   , Motrin  , Nuprin     Naproxen - Brand names Aleve  , Naprosyn  , Anaprox      PRESCRIPTION NSAIDs   Celebrex   (Celecoxib)                                       Orudis   (Ketoprofen)  Mobic   (Meloxicam)                                           Lodine   (Etodolac)  Voltaren  (Diclofenac)                                        Ansaid   (Flurbiprofen)  Relafen   Nabumetone                                       Clinoril   (Sulindac)  Feldene   (Piroxicam)                                         Tolectin   Tolmentin  Indocin   (Indomethacin)                                    Daypro  (Oxaprozin)       After  surgery, the only safe non-prescription pain reliever is acetaminophen (Tylenol ).  Acetaminophen is available in 325mg and 500mg tablets.  If acetaminophen does not control your pain, call your primary care provider to discuss other options.    4. Other medications you may have to avoid  Aspirin  Do not use aspirin for headaches, body aches, or muscle aches after bariatric surgery.  This is because aspirin can also cause stomach ulcers.  However, your primary care provider may tell you to take aspirin for certain conditions.  A maximum of 81mg of enteric coated aspirin (ex: Ecotrin ) once daily is usually okay to take if directed to take aspirin by your primary care doctor.   Be sure to take with food or milk.    Alendronate (Fosamax ), risedronate (Actonel ) (risedronate), ibandronate (Boniva ):  These are common osteoporosis medications that can cause erosions or ulcers in the esophagus and stomach.   Remind your primary care provider that you have had bariatric surgery and discuss other medication options.    Diuretics ( water pills )  These medications are used to treat high blood pressure.  They can also reduce swelling and water retention caused by various medical conditions.  Diuretics should not be used for patients who are having weight loss surgery.  Your diuretic will not be restarted immediately after surgery.  This is because it is often difficult to drink enough fluids after surgery to keep up with the fluid loss caused by the diuretic/ water pill .  Call your primary care provider to discuss alternative medications to treat high blood pressure.      5. Medications for chronic conditions  It is likely that the need for some of your medications will change after weight loss surgery.    High Blood Pressure Medications   As you lose weight, your blood pressure may change. Talk with your primary care provider about how to manage your high blood pressure after surgery. You may need the dose of your blood  pressure medication(s) adjusted.  Keep track of your blood pressure, and call your primary doctor if you have low blood pressure symptoms, such as: dizziness, faintness, weakness, light-headedness (especially when going from sitting to standing)    Diabetes Medications  As you lose weight, your blood sugars may change. Talk with your primary care provider about how to monitor and manage your diabetes after surgery. You may need the dose of your diabetes medication(s) adjusted.  Call your primary doctor if you have any of these symptoms.  Keep track of your blood sugars, and call your primary doctor if you have low blood sugar symptoms, such as: sweating, shaking, nervousness, headache, light-headedness, dizziness, weakness, or fainting      6. Other information    Medication Absorption  Some medications may not be absorbed as well after bariatric surgery.  Watch for changes in symptoms.  It is important to discuss your medications with your doctor if you think your medications are not working as well as they were before.  You may need to have medication doses adjusted.    Ursodiol (Actigall )  With rapid weight loss, your risk of gallstones increases.  If your still have your gallbladder after surgery, you will be given a prescription for Actigall  that you should begin taking 2 weeks after surgery.  Actigall  will help prevent gallstones from forming.  If you are prescribed this medication, you will usually take it for six months. We encourage you to take this tablet or capsule whole with warm or hot liquid to help it dissolve before it reaches your stomach pouch.  This is medication is bigger than the   inch size restriction, but we will not have you cut/crush it due to it's unpleasant metallic taste. A pharmacist will speak with you more about this medication if it is ordered after surgery.    Common Side Effects: constipation, diarrhea, upset stomach, nausea, dizziness    Non-prescription medications:  You may  need medication for seasonal allergies, colds, headaches, or minor aches and pains.  It is important to read the package label carefully.  Below are some helpful hints for choosing the right medication:   Look at the active ingredient(s) list to be sure the medication does not contain caffeine, NSAIDs or aspirin (maximum aspirin dose: 81 mg daily).  Avoid long-acting medication options.  Immediate release medications may work better because they are absorbed better.  It is okay to use liquid medications with the following cautions:  Watch the sugar concentration.  High sugar content in medications could cause dumping syndrome.  Diluting the liquid medication with an equal amount of water will help prevent dumping syndrome.  Do not use products that contain high fructose corn syrup, corn syrup, sugar, or sorbitol.  Look for sugar-free products as an alternative.  Chewable tablets or tablets that dissolve on your tongue ( oral-dissolving tablet ) are generally safe to use.      Supplements  Refer to the Vitamins & Supplements Handout provided to you by the Bariatric Dietitian for recommended doses and products.       After Bariatric Surgery Discharge Instructions  [facility]     Note: Ask your nurse to order your medications from the pharmacy. Be sure you have your medications with you when you leave.    What should my diet be for the first 2 weeks after surgery?  Follow the bariatric diet the dietitian discussed with you.    How much fluid should I drink?  Strive for 48-64 ounces daily.  Carry a water bottle with you without a straw or sports top. Drink from it often.  Keep track of how much fluid you drink in a day.  Remember:  -Do not use straws, chew gum or suck on hard candies. They may cause painful gas.    -Sip, don't slurp when you drink.    -Practice small sips using a medicine cup for the first week postop.   -No ice or cold drinks. This could cause gas or spasms.   -No  coffee, soda pop or drinks with caffeine. These may cause stomach pain.   -No alcohol. It is bad for your liver and will cause stomach pain.    How often should I do my deep breathing and coughing?  Use your incentive spirometer (small plastic breathing device) every hour while awake after you get home. Using the incentive spirometer helps you deep breath. Continuing to cough and deep breath will help prevent fevers and pneumonia.   If you do not have a fever after one week, you can stop using the incentive spirometer and discard it.     You can continue to take deep breaths without the incentive spirometer every one to two hours while awake for the month after surgery.    What kinds of activity can I do?  Get plenty of rest the first few days after surgery and try to balance rest and activity. You will need some time to recover - you may be more tired than you realize at first. You'll feel better and heal faster if you take good care of yourself.  For 2 weeks after surgery (Please review restrictions at your two week visit, they could change based on how well you are doing):   -Don't lift more than 20 pounds.   -Take 4-5 short walks every day.  -Don't jog, run, or do belly exercises.  Don't swim, bathe or use a hot tub until your cuts are healed (scabs are gone).    You may shower right away after surgery.  Don't plan to fly or take a road trip within the first 1 to 3 months after surgery.  You could get a blood clot in your legs. If you must travel, get up and move around every hour for at least 5 minutes before continuing on your journey.  Your care team can help you decide when it's safe for you to travel.     What can I do for pain control?  You had major belly surgery that involves all layers of your belly muscles. Pain is expected, even for some as far out as 6-8 weeks after surgery. Moving, sneezing, coughing, and breathing will cause discomfort because these activities use your belly muscles.   Please see  your after visit summary medication review for what pain medication will be continued, discontinued and newly started for you.    You can take opioid pain medicine if prescribed and if needed. Try to wean off from it as soon as you feel comfortable.   Do not drive while you are taking opioid pain medicine. This is dangerous.  The first three days after surgery, take your acetaminophen (Tylenol) scheduled every 6 hours.  After that you can take it as needed.  -Acetaminophen formulation options:   -Liquid   -Caplet (Cut caplet in half before taking)   -We will have you on a higher dose of Tylenol for the first three days post-op but then but then you should not exceed 3000mg per day.  -You will also take Tylenol for pain in place of the opioid pain medicine (check with your care team for specifics).   You can apply ice or heat to the affected area(s). Just remember to wrap the ice in something and limit icing sessions to 20 minutes. Excessive icing can irritate the skin or cause skin damage.  You can apply heat with a hot, wet towel or heating pad. Just like cold therapy, limit heat application to 20 minutes. Never sleep with a heating pad on. It could cause severe burns to your skin.  Wear your binder to support your belly muscles if you have one.  Take this off a little more each day and try to be off completely by 2 weeks after surgery. If you don't need your binder for comfort or support, you don't need to wear it.   You may not be able to sleep in a comfortable position for a few weeks after surgery. This is normal. You may be more comfortable sleeping in a recliner or propped up with 3 pillows for the first couple of weeks after surgery.  You may feel gas pains in the upper chest or between your shoulder blades from the laparoscopic surgery which should get better with walking around, warm/cold packs and pain medication.  Do not take NSAID's (Non-Steroidal Anti-Inflammatory Drugs) (examples: ibuprofen, Motrin,  Advil, Aleve, and Naproxen), aspirin, or use pain patches with NSAID's. They will increase your risk of bleeding or getting an ulcer.    Please call the clinic for any of the following pain concerns, we would like to talk to you:  -pain that does not improve with rest  -pain that gets worse and worse  -pain that is not controlled by your pain medicine  -a sudden severe increase in pain    What medications will I need to take after surgery?  You may be discharged with the following types of medications: antacids, pain medications, anti-nausea medications, etc.  Please see your after visit summary medication review for what will be continued, discontinued and newly started.  It is important to reduce the amount of acid in your new stomach for a couple of months after surgery while it is still healing. We will prescribe an acid reducer or antacid. Take it as directed. This will help prevent ulcers, heartburn and acid reflux.  If you took an acid reducer before you had surgery, your care team will let you know what acid reducer you will take after surgery.   It is okay to swallow any medications smaller than   inch in size.   -If it is larger than   inch, it may need to be cut, crushed, or in a liquid form. See the above medication section for what is most appropriate for you and your medications.    What should I know about my incisions (cuts)?  Your incisions are covered with white steri strips or butterfly tape and have band aids or gauze over the top. If you have gauze or band aids, they can come off in the hospital.  Leave your steri strips on until they fall off on their own. If the steri strips don't fall off after 1 week, you can take them off. If they fall off earlier, replace them with clean band aids trying to avoid touching the incision itself.   If you have gauze covered by a clear dressing, remove 2-3 days after surgery or as directed by your care team.  You may shower in the hospital after surgery and can  get your incision coverings wet.  Do not submerge in water (e.g. No baths, swimming pools, hot tubs) until your incisions are completely healed (scabs are gone).    Call the clinic if you have any of these signs or symptoms of infection:  -Redness around the site.  -Drainage that smells bad.  -Drainage that is thick yellow or green.  -An increase in pain around the incision site  -An increase in swelling around the incision site  -Heat or warmth around incision site   -Fever of 101.5  F (38.3  C) or higher when taken under the tongue.    -Chills    Will my urine or bowel movements change?   Your first bowel movements (stools) will likely be liquid. You may also notice old blood or a darker color (black or maroon color) in your bowel movements.  This is not unusual and usually goes away after the first week, if not sooner. You may not have a bowel movement for a week.   If you have not had a bowel movement for at least three days after your surgery date and are passing gas, you can use over the counter stool softeners.  Please stop taking the stool softeners and laxatives if your stools are loose.  Increasing fluids and activity as well as getting off narcotics will help prevent constipation.    Call the clinic if:   -You have stomach pain.   -You continue to have constipation.  -You have excessive bloating after walking and passing gas.  -You are having 3 or more loose stools a day.  You may have an infection that we need to treat.    How can I prevent dehydration if I feel nauseated (sick to my stomach) and vomit (throw up)?   Vomiting is not normal after surgery. If you continue to have nausea and vomiting, call the clinic.   Nausea can be a sign of dehydration. That is why it is very important to track your fluids. Do not nap more than one hour during the day. Set a timer to wake yourself up, if needed. Too much sleep will keep you from drinking enough fluid during the day and lead to dehydration.  No outside  "activity in hot, humid weather until you can drink 48 to 64 ounces of fluid in 24 hours. If you sweat a lot, your body may lose too much water.  If having difficulty getting in your fluids, try to take a 1 ounce sip of water (one medicine cup) every 15 minutes.  Set a timer to remind yourself.    If you notice any of the following symptoms, please don't delay in calling the clinic.  Early identification gives us more options for treatment:  -Dark colored urine  -Urinating (pass water) less than 2-3 times per day  -Lack of energy  -Nausea  -Dizziness  -Headache  -Metallic taste in your mouth    Call the clinic ANY TIME at [phone] if:  -Your pain medicine is not working.  -You have a fever ? 101.5 F.  -You have belly or left shoulder pain that gets worse and worse.  -You have a swollen leg with redness, warmth, or pain behind the knee or calf.  -You have chest pain   -You feel very short of breath.  -You have a sudden severe increase in heart rate.  -You have vomiting that gets worse and worse.  -You have constant nausea (feeling sick to your stomach) that does not go away with medication.  -You have trouble swallowing.  -You have an increasing feeling that \"something is not right\".  -You have hiccups that do not stop.  -You have any questions or concerns.    If you have to go to the Emergency Room, we prefer you go to the hospital that did your surgery. Please let them know that you had bariatric surgery and to notify your surgeon.    When should I go back to the clinic?  Follow up with your care team in 1 week.   If this appointment was not already made, please call: [phone]    IMPORTANT PHONE NUMBERS:    Bariatric Nurse line (M-F 8am to 4:30pm)=[phone]  Main line (after hours/holidays/weekends)=[phone]    These are some additional handouts that are very important to read through and use after surgery.  They are good tools for after your surgery as you recover.      After Your Weight Loss " Surgery  [email or web address]      Mindfulness Meditation  [email or web address]    Conscious Breathing  [email or web address]    Keeping Track of Your Fluids (for after surgery)  [email or web address]      AFTER SURGERY APPOINTMENT SCHEDULE    1 week with Dietitian (ITZEL)  Dietitian Virtual Group Class scheduled for Tuesday 6/11/2024 from 1-2 pm with one of the dietitians. She will send you an email invitation to join the week of this class and the purpose of it is to check in with you and give you the next set of dietary instructions.  It will be using [employer] Teams, NOT in person or through [employer].    2 weeks with Surgeon  Surgeon video follow-up visit that will be done through [employer] which is already scheduled for you.    1 month with RD    3 months with RD    3 months with Psychologist    6 months with Bariatrician with labs    9 months with RD     12 months with Bariatrician with labs    18 months with RD or Bariatrician-possible labs    Annually after that with Bariatrician with labs and RD if needed      POP QUIZ    1.  How long do you need to be on full liquids after surgery? ________    2.  Name the 4 vitamin/mineral supplements you ll need to take for the rest of your life.    __________  _________  _________  __________    3.  How many grams of protein should you get in a day? ________    4.   Which meal would give you the most protein?    a.   1 oz. Cheese on 1 saltine cracker and 3 Tbsp applesauce.    b.     cup mashed potatoes and gravy with 2 Tablespoons applesauce    c.   3 Reduced Fat Wheat Thins, 1 oz. green beans, and 2 peeled grapes     5.   Concentrated urine, lack of energy, nausea, dizziness, headache, and a bad taste in your mouth are signs of:    a. Dumping    b.  Dehydration   c. Clogging    d. None of the above    6.     What is the minimum amount of time recommended for exercise?    a.  30 minutes 7 days a week    b.  30 minutes 3  days per week    c.   1 hour 5 days per week    d.  None of the above    7.     Drinking liquids with meals can cause:    a.  Vomiting     b.   Weight gain   c.   Desire to Snack    d.   All of the above    8.     The long-term success of my weight loss surgery depends on:    a.      Me      b.   My Surgeon     c.  My Support Group     d.  My Family    9.      If you receive ice, carbonation or straws in the hospital post-op, you should:    a.      Eat and drink whatever is given to you by the hospital staff    b.      Remind the hospital staff you are not to have ice, straws or carbonation.    c.      Take the ice but not the carbonation or straws    d.      Call 911    10.  Name two possible complications after bariatric surgery:    ________________________   ______________________    11. Name two habits of healthy bariatric surgery patients:    _______________________  ________________________    True or False    12.  This operation for obesity will require routine visits with my surgeon for the first year, and then I will be okay on my own once I lose my weight and change my diet.    13.  Obesity is a disease that can be managed with a variety of tools.  However, some individuals fail to lose weight or regain their weight because they resume snacking, binging, take in high fat or carbohydrate food & lack sufficient liquids and exercise.    14.  Women should avoid becoming pregnant for at least 18 months to 2 years following bariatric surgery.    15.  I can still drink 2-3 cans of soda or carbonated juices, water or other beverages after my surgery, as long as it is in moderation.    16.   Re-operation is sometimes necessary due to bleeding, hernias, ulceration, breakdown of stitches or staples, leakage and blockage of the intestines.    17.   I should call the clinic if I develop increased redness or swelling in or around my incision, an oral temperature of 101.5 or greater, increasing severe abdominal or  shoulder pain, increasing heart rate or anytime I just don t feel right.    18.   Gaining weight prior to surgery is dangerous because it can enlarge the liver, increase surgical risk and extend your recovery period.    19.   Once I lose my weight, I can drink alcohol as long as it is in moderation.    20.  It is possible I will have more emotional difficulties after surgery.    21.  I can take Aleve but not Ibuprofen or Aspirin after surgery.    22.  If I eat yogurt and drink milk daily, I don t have to take the recommended dose of calcium supplements    23.  Dumping Syndrome only occurs in gastric sleeve patients.    24.  It is possible you will gain weight or not lose much weight immediately after surgery.       We wish you the best and please let us know if you have any questions or concerns!    Sheila Hernández RN and Serenity Miller RN    [facility]  [address] [place] [address], [address]  Phone: [phone]  Fax:  [phone]

## 2024-05-14 NOTE — PROGRESS NOTES
Pinky is scheduled for a LRNY ([id number]) with Dr. Arnold Rizo on Monday, Lizette 3, 2024 @ 9:20 am.  Scheduled for pre op class on Wednesday, May 15, 2024 @ 12:30 pm.  She is scheduled for her pre op and testing on Monday, May 20, 2024 at [facility] in [place], [place].        PA sent to [facility] for approval and was returned as not needing a PA due to Medicare being Primary.  Letter sent to Carla in [employer]

## 2024-05-14 NOTE — PROGRESS NOTES
Patient attended group class to review pre-op instructions for upcoming surgery.    Discussed admission process, COVID restrictions and hospital course.  Pharmacy information packet given and explained. Patient was given exercises to work on post-op for maintaining muscle mass and strengthening.  Bariatric quiz reviewed in class. Discharge instructions and follow up appointments given and reviewed with pt at this time and patient verbalized understanding. She will have her H&P at [facility] on 5/20/2024 with  and do her pre-op testing at that visit. Orders faxed to [phone] and also handed to pt at class.  Prescriptions for Omeprazole and vitamins sent to the patient's pharmacy to be started after surgery.      Serenity Miller RN, CBN  [facility]  P [phone]  F [phone]

## 2024-05-14 NOTE — PROGRESS NOTES
Pre-op Class Checklist:    Initial Wt: 231 lb  AB Visit:    Wt Readings from Last 1 Encounters:   05/01/24 106.5 kg (234 lb 11.2 oz)     Hemoglobin A1C   Date Value Ref Range Status   01/24/2024 5.4 0.0 - 5.6 % Final     Comment:     Normal <5.7%   Prediabetes 5.7-6.4%    Diabetes 6.5% or higher     Note: Adopted from ADA consensus guidelines.      CPAP: No   Smoking Cessation Date: 1/19/2024  Urine Nicotine Screen: Yes   Support Group: Yes   Anticoag Risk: Standard   Actigall: Rosalia hx   Omeprazole:  On Protonix    Consent: Complete  Pharmacy: [id number] [place] [address] [facility] [address] [[id number]]   Bariatrician: Fawn Young MD   Dietitian: Zeeshan  Appointment with Zeb scheduled: Yes  Lab request message sent: Yes     Tasklist updated.    Serenity Miller RN, CBN  [facility]  P [phone]  F [phone]       Will try Detrol.

## 2024-05-15 ENCOUNTER — ALLIED HEALTH/NURSE VISIT (OUTPATIENT)
Dept: SURGERY | Facility: CLINIC | Age: 54
End: 2024-05-15
Payer: COMMERCIAL

## 2024-05-15 VITALS — WEIGHT: 241 LBS | HEIGHT: 64 IN | BODY MASS INDEX: 41.15 KG/M2

## 2024-05-15 DIAGNOSIS — K22.89 OTHER SPECIFIED DISEASE OF ESOPHAGUS: ICD-10-CM

## 2024-05-15 DIAGNOSIS — E66.01 MORBID OBESITY (H): ICD-10-CM

## 2024-05-15 DIAGNOSIS — Z01.818 PRE-OP TESTING: ICD-10-CM

## 2024-05-15 DIAGNOSIS — K90.9 INTESTINAL MALABSORPTION, UNSPECIFIED TYPE: ICD-10-CM

## 2024-05-15 DIAGNOSIS — Z87.891 PERSONAL HISTORY OF TOBACCO USE, PRESENTING HAZARDS TO HEALTH: ICD-10-CM

## 2024-05-15 DIAGNOSIS — Z98.84 S/P BARIATRIC SURGERY: ICD-10-CM

## 2024-05-15 DIAGNOSIS — K21.9 ACID REFLUX: ICD-10-CM

## 2024-05-15 DIAGNOSIS — Z71.89 ENCOUNTER FOR PRE-BARIATRIC SURGERY COUNSELING AND EDUCATION: Primary | ICD-10-CM

## 2024-05-15 PROCEDURE — 99207 PR PREOP VISIT IN GLOBAL PKG: CPT

## 2024-05-15 NOTE — PROGRESS NOTES
Pt attended the pre-surgery class for bariatric surgery class and was educated on the pre and post surgery liquid diets. Patient received information via [employer] for the pre-op liquid diet and the post-op liquid diet. Discussed appropriate liquids and discussed portions. Reviewed appropriate calories, protein, and fluid goals for each stage before and after surgery. Educated on correct vitamins/minerals, dosage, and frequency to take after surgery. Provided grocery list and sample menu plan for each diet stage.      Pt will begin pre-op liquid diet 5/20/2024 and will do clear liquids the day before surgery. Pt is scheduled for RNY on 6/3/2024 and will then follow 1 week post-op liquid diet. Pt will follow up with RD 1-week post-op for education on the pureed and soft/regular diet stages.    Juliana Sherwood RD

## 2024-05-19 ENCOUNTER — HEALTH MAINTENANCE LETTER (OUTPATIENT)
Age: 54
End: 2024-05-19

## 2024-06-01 ENCOUNTER — ANESTHESIA EVENT (OUTPATIENT)
Dept: SURGERY | Facility: HOSPITAL | Age: 54
DRG: 620 | End: 2024-06-01
Payer: MEDICARE

## 2024-06-02 RX ORDER — MORPHINE SULFATE 1 MG/ML
150 INJECTION, SOLUTION EPIDURAL; INTRATHECAL; INTRAVENOUS ONCE
Status: DISCONTINUED | OUTPATIENT
Start: 2024-06-02 | End: 2024-06-02

## 2024-06-03 ENCOUNTER — ANESTHESIA (OUTPATIENT)
Dept: SURGERY | Facility: HOSPITAL | Age: 54
DRG: 620 | End: 2024-06-03
Payer: MEDICARE

## 2024-06-03 ENCOUNTER — HOSPITAL ENCOUNTER (INPATIENT)
Facility: HOSPITAL | Age: 54
LOS: 2 days | Discharge: HOME | DRG: 620 | End: 2024-06-05
Attending: SURGERY | Admitting: SURGERY
Payer: MEDICARE

## 2024-06-03 DIAGNOSIS — Z98.84 S/P GASTRIC BYPASS: ICD-10-CM

## 2024-06-03 DIAGNOSIS — E66.01 MORBID (SEVERE) OBESITY DUE TO EXCESS CALORIES (H): Primary | ICD-10-CM

## 2024-06-03 LAB — GLUCOSE BLDC GLUCOMTR-MCNC: 125 MG/DL (ref 70–99)

## 2024-06-03 PROCEDURE — 250N000011 HC RX IP 250 OP 636: Performed by: SURGERY

## 2024-06-03 PROCEDURE — 0D164ZA BYPASS STOMACH TO JEJUNUM, PERCUTANEOUS ENDOSCOPIC APPROACH: ICD-10-PCS | Performed by: SURGERY

## 2024-06-03 PROCEDURE — 250N000011 HC RX IP 250 OP 636: Mod: JZ | Performed by: NURSE PRACTITIONER

## 2024-06-03 PROCEDURE — 250N000025 HC SEVOFLURANE, PER MIN: Performed by: SURGERY

## 2024-06-03 PROCEDURE — 710N000009 HC RECOVERY PHASE 1, LEVEL 1, PER MIN: Performed by: SURGERY

## 2024-06-03 PROCEDURE — 250N000013 HC RX MED GY IP 250 OP 250 PS 637: Performed by: NURSE PRACTITIONER

## 2024-06-03 PROCEDURE — 999N000141 HC STATISTIC PRE-PROCEDURE NURSING ASSESSMENT: Performed by: SURGERY

## 2024-06-03 PROCEDURE — 250N000009 HC RX 250: Performed by: NURSE ANESTHETIST, CERTIFIED REGISTERED

## 2024-06-03 PROCEDURE — 258N000003 HC RX IP 258 OP 636: Performed by: ANESTHESIOLOGY

## 2024-06-03 PROCEDURE — 250N000011 HC RX IP 250 OP 636: Performed by: ANESTHESIOLOGY

## 2024-06-03 PROCEDURE — 258N000003 HC RX IP 258 OP 636: Performed by: NURSE PRACTITIONER

## 2024-06-03 PROCEDURE — 43644 LAP GASTRIC BYPASS/ROUX-EN-Y: CPT | Performed by: SURGERY

## 2024-06-03 PROCEDURE — 250N000011 HC RX IP 250 OP 636: Performed by: NURSE ANESTHETIST, CERTIFIED REGISTERED

## 2024-06-03 PROCEDURE — 258N000003 HC RX IP 258 OP 636: Performed by: NURSE ANESTHETIST, CERTIFIED REGISTERED

## 2024-06-03 PROCEDURE — 120N000001 HC R&B MED SURG/OB

## 2024-06-03 PROCEDURE — 370N000017 HC ANESTHESIA TECHNICAL FEE, PER MIN: Performed by: SURGERY

## 2024-06-03 PROCEDURE — 0DNW4ZZ RELEASE PERITONEUM, PERCUTANEOUS ENDOSCOPIC APPROACH: ICD-10-PCS | Performed by: SURGERY

## 2024-06-03 PROCEDURE — 272N000001 HC OR GENERAL SUPPLY STERILE: Performed by: SURGERY

## 2024-06-03 PROCEDURE — 250N000013 HC RX MED GY IP 250 OP 250 PS 637: Performed by: SURGERY

## 2024-06-03 PROCEDURE — 250N000009 HC RX 250: Performed by: SURGERY

## 2024-06-03 PROCEDURE — 43644 LAP GASTRIC BYPASS/ROUX-EN-Y: CPT | Mod: AS | Performed by: NURSE PRACTITIONER

## 2024-06-03 PROCEDURE — 360N000077 HC SURGERY LEVEL 4, PER MIN: Performed by: SURGERY

## 2024-06-03 RX ORDER — PROPOFOL 10 MG/ML
INJECTION, EMULSION INTRAVENOUS CONTINUOUS PRN
Status: DISCONTINUED | OUTPATIENT
Start: 2024-06-03 | End: 2024-06-03

## 2024-06-03 RX ORDER — NALOXONE HYDROCHLORIDE 0.4 MG/ML
0.4 INJECTION, SOLUTION INTRAMUSCULAR; INTRAVENOUS; SUBCUTANEOUS
Status: DISCONTINUED | OUTPATIENT
Start: 2024-06-03 | End: 2024-06-05 | Stop reason: HOSPADM

## 2024-06-03 RX ORDER — HYDROMORPHONE HCL IN WATER/PF 6 MG/30 ML
0.2 PATIENT CONTROLLED ANALGESIA SYRINGE INTRAVENOUS EVERY 5 MIN PRN
Status: DISCONTINUED | OUTPATIENT
Start: 2024-06-03 | End: 2024-06-03 | Stop reason: HOSPADM

## 2024-06-03 RX ORDER — LORAZEPAM 2 MG/ML
.5-1 INJECTION INTRAMUSCULAR EVERY 6 HOURS PRN
Status: DISCONTINUED | OUTPATIENT
Start: 2024-06-03 | End: 2024-06-05 | Stop reason: HOSPADM

## 2024-06-03 RX ORDER — NALOXONE HYDROCHLORIDE 0.4 MG/ML
0.2 INJECTION, SOLUTION INTRAMUSCULAR; INTRAVENOUS; SUBCUTANEOUS
Status: DISCONTINUED | OUTPATIENT
Start: 2024-06-03 | End: 2024-06-05 | Stop reason: HOSPADM

## 2024-06-03 RX ORDER — CEFAZOLIN SODIUM/WATER 2 G/20 ML
2 SYRINGE (ML) INTRAVENOUS
Status: COMPLETED | OUTPATIENT
Start: 2024-06-03 | End: 2024-06-03

## 2024-06-03 RX ORDER — CEFAZOLIN SODIUM/WATER 2 G/20 ML
2 SYRINGE (ML) INTRAVENOUS SEE ADMIN INSTRUCTIONS
Status: DISCONTINUED | OUTPATIENT
Start: 2024-06-03 | End: 2024-06-03 | Stop reason: HOSPADM

## 2024-06-03 RX ORDER — ENOXAPARIN SODIUM 100 MG/ML
40 INJECTION SUBCUTANEOUS EVERY 24 HOURS
Status: DISCONTINUED | OUTPATIENT
Start: 2024-06-03 | End: 2024-06-05 | Stop reason: HOSPADM

## 2024-06-03 RX ORDER — VENLAFAXINE 25 MG/1
50 TABLET ORAL
Status: DISCONTINUED | OUTPATIENT
Start: 2024-06-04 | End: 2024-06-05 | Stop reason: HOSPADM

## 2024-06-03 RX ORDER — ONDANSETRON 4 MG/1
4 TABLET, ORALLY DISINTEGRATING ORAL EVERY 30 MIN PRN
Status: DISCONTINUED | OUTPATIENT
Start: 2024-06-03 | End: 2024-06-03 | Stop reason: HOSPADM

## 2024-06-03 RX ORDER — ACETAMINOPHEN 325 MG/10.15ML
975 LIQUID ORAL EVERY 6 HOURS
Status: DISCONTINUED | OUTPATIENT
Start: 2024-06-03 | End: 2024-06-05 | Stop reason: HOSPADM

## 2024-06-03 RX ORDER — MAGNESIUM SULFATE 4 G/50ML
4 INJECTION INTRAVENOUS ONCE
Status: COMPLETED | OUTPATIENT
Start: 2024-06-03 | End: 2024-06-03

## 2024-06-03 RX ORDER — LIDOCAINE 40 MG/G
CREAM TOPICAL
Status: DISCONTINUED | OUTPATIENT
Start: 2024-06-03 | End: 2024-06-03 | Stop reason: HOSPADM

## 2024-06-03 RX ORDER — ONDANSETRON 2 MG/ML
4 INJECTION INTRAMUSCULAR; INTRAVENOUS EVERY 30 MIN PRN
Status: DISCONTINUED | OUTPATIENT
Start: 2024-06-03 | End: 2024-06-03 | Stop reason: HOSPADM

## 2024-06-03 RX ORDER — NALOXONE HYDROCHLORIDE 1 MG/ML
0.4 INJECTION INTRAMUSCULAR; INTRAVENOUS; SUBCUTANEOUS
Status: DISCONTINUED | OUTPATIENT
Start: 2024-06-03 | End: 2024-06-03

## 2024-06-03 RX ORDER — PIPERACILLIN SODIUM, TAZOBACTAM SODIUM 3; .375 G/15ML; G/15ML
3.38 INJECTION, POWDER, LYOPHILIZED, FOR SOLUTION INTRAVENOUS ONCE
Status: COMPLETED | OUTPATIENT
Start: 2024-06-03 | End: 2024-06-03

## 2024-06-03 RX ORDER — LIDOCAINE 40 MG/G
CREAM TOPICAL
Status: DISCONTINUED | OUTPATIENT
Start: 2024-06-03 | End: 2024-06-05 | Stop reason: HOSPADM

## 2024-06-03 RX ORDER — PIPERACILLIN SODIUM, TAZOBACTAM SODIUM 3; .375 G/15ML; G/15ML
3.38 INJECTION, POWDER, LYOPHILIZED, FOR SOLUTION INTRAVENOUS EVERY 8 HOURS
Status: DISCONTINUED | OUTPATIENT
Start: 2024-06-03 | End: 2024-06-03

## 2024-06-03 RX ORDER — PIPERACILLIN SODIUM, TAZOBACTAM SODIUM 3; .375 G/15ML; G/15ML
3.38 INJECTION, POWDER, LYOPHILIZED, FOR SOLUTION INTRAVENOUS EVERY 8 HOURS
Qty: 30 ML | Refills: 0 | Status: COMPLETED | OUTPATIENT
Start: 2024-06-03 | End: 2024-06-04

## 2024-06-03 RX ORDER — HEPARIN SODIUM 5000 [USP'U]/.5ML
5000 INJECTION, SOLUTION INTRAVENOUS; SUBCUTANEOUS ONCE
Status: COMPLETED | OUTPATIENT
Start: 2024-06-03 | End: 2024-06-03

## 2024-06-03 RX ORDER — PROPOFOL 10 MG/ML
INJECTION, EMULSION INTRAVENOUS PRN
Status: DISCONTINUED | OUTPATIENT
Start: 2024-06-03 | End: 2024-06-03

## 2024-06-03 RX ORDER — NALOXONE HYDROCHLORIDE 0.4 MG/ML
0.1 INJECTION, SOLUTION INTRAMUSCULAR; INTRAVENOUS; SUBCUTANEOUS
Status: DISCONTINUED | OUTPATIENT
Start: 2024-06-03 | End: 2024-06-03 | Stop reason: HOSPADM

## 2024-06-03 RX ORDER — ACETAMINOPHEN 325 MG/1
975 TABLET ORAL EVERY 6 HOURS
Status: DISCONTINUED | OUTPATIENT
Start: 2024-06-03 | End: 2024-06-05 | Stop reason: HOSPADM

## 2024-06-03 RX ORDER — SODIUM CHLORIDE, SODIUM LACTATE, POTASSIUM CHLORIDE, CALCIUM CHLORIDE 600; 310; 30; 20 MG/100ML; MG/100ML; MG/100ML; MG/100ML
INJECTION, SOLUTION INTRAVENOUS CONTINUOUS
Status: DISCONTINUED | OUTPATIENT
Start: 2024-06-03 | End: 2024-06-03 | Stop reason: HOSPADM

## 2024-06-03 RX ORDER — SCOLOPAMINE TRANSDERMAL SYSTEM 1 MG/1
1 PATCH, EXTENDED RELEASE TRANSDERMAL ONCE
Status: DISCONTINUED | OUTPATIENT
Start: 2024-06-03 | End: 2024-06-03

## 2024-06-03 RX ORDER — BUPIVACAINE HYDROCHLORIDE AND EPINEPHRINE 2.5; 5 MG/ML; UG/ML
INJECTION, SOLUTION INFILTRATION; PERINEURAL PRN
Status: DISCONTINUED | OUTPATIENT
Start: 2024-06-03 | End: 2024-06-03 | Stop reason: HOSPADM

## 2024-06-03 RX ORDER — ONDANSETRON 2 MG/ML
INJECTION INTRAMUSCULAR; INTRAVENOUS PRN
Status: DISCONTINUED | OUTPATIENT
Start: 2024-06-03 | End: 2024-06-03

## 2024-06-03 RX ORDER — NALBUPHINE HYDROCHLORIDE 20 MG/ML
5 INJECTION, SOLUTION INTRAMUSCULAR; INTRAVENOUS; SUBCUTANEOUS EVERY 4 HOURS PRN
Status: DISCONTINUED | OUTPATIENT
Start: 2024-06-03 | End: 2024-06-05 | Stop reason: HOSPADM

## 2024-06-03 RX ORDER — ONDANSETRON 2 MG/ML
4 INJECTION INTRAMUSCULAR; INTRAVENOUS EVERY 6 HOURS PRN
Status: DISCONTINUED | OUTPATIENT
Start: 2024-06-03 | End: 2024-06-05 | Stop reason: HOSPADM

## 2024-06-03 RX ORDER — FENTANYL CITRATE 50 UG/ML
50 INJECTION, SOLUTION INTRAMUSCULAR; INTRAVENOUS EVERY 5 MIN PRN
Status: DISCONTINUED | OUTPATIENT
Start: 2024-06-03 | End: 2024-06-03 | Stop reason: HOSPADM

## 2024-06-03 RX ORDER — PROCHLORPERAZINE MALEATE 10 MG
10 TABLET ORAL EVERY 6 HOURS PRN
Status: DISCONTINUED | OUTPATIENT
Start: 2024-06-03 | End: 2024-06-05 | Stop reason: HOSPADM

## 2024-06-03 RX ORDER — ONDANSETRON 2 MG/ML
4 INJECTION INTRAMUSCULAR; INTRAVENOUS EVERY 4 HOURS PRN
Status: DISCONTINUED | OUTPATIENT
Start: 2024-06-03 | End: 2024-06-03

## 2024-06-03 RX ORDER — ONDANSETRON 4 MG/1
4 TABLET, ORALLY DISINTEGRATING ORAL EVERY 6 HOURS PRN
Status: DISCONTINUED | OUTPATIENT
Start: 2024-06-03 | End: 2024-06-05 | Stop reason: HOSPADM

## 2024-06-03 RX ORDER — MORPHINE SULFATE 1 MG/ML
150 INJECTION, SOLUTION EPIDURAL; INTRATHECAL; INTRAVENOUS ONCE
Status: COMPLETED | OUTPATIENT
Start: 2024-06-03 | End: 2024-06-03

## 2024-06-03 RX ORDER — ACETAMINOPHEN 325 MG/1
975 TABLET ORAL ONCE
Status: COMPLETED | OUTPATIENT
Start: 2024-06-03 | End: 2024-06-03

## 2024-06-03 RX ORDER — DEXAMETHASONE SODIUM PHOSPHATE 10 MG/ML
INJECTION, SOLUTION INTRAMUSCULAR; INTRAVENOUS PRN
Status: DISCONTINUED | OUTPATIENT
Start: 2024-06-03 | End: 2024-06-03

## 2024-06-03 RX ORDER — DIPHENHYDRAMINE HYDROCHLORIDE 50 MG/ML
25 INJECTION INTRAMUSCULAR; INTRAVENOUS EVERY 6 HOURS PRN
Status: DISCONTINUED | OUTPATIENT
Start: 2024-06-03 | End: 2024-06-05 | Stop reason: HOSPADM

## 2024-06-03 RX ORDER — BUPROPION HYDROCHLORIDE 75 MG/1
75 TABLET ORAL 2 TIMES DAILY
Status: DISCONTINUED | OUTPATIENT
Start: 2024-06-04 | End: 2024-06-05 | Stop reason: HOSPADM

## 2024-06-03 RX ORDER — DEXAMETHASONE SODIUM PHOSPHATE 4 MG/ML
4 INJECTION, SOLUTION INTRA-ARTICULAR; INTRALESIONAL; INTRAMUSCULAR; INTRAVENOUS; SOFT TISSUE
Status: DISCONTINUED | OUTPATIENT
Start: 2024-06-03 | End: 2024-06-03 | Stop reason: HOSPADM

## 2024-06-03 RX ORDER — FENTANYL CITRATE 50 UG/ML
25 INJECTION, SOLUTION INTRAMUSCULAR; INTRAVENOUS EVERY 5 MIN PRN
Status: DISCONTINUED | OUTPATIENT
Start: 2024-06-03 | End: 2024-06-03 | Stop reason: HOSPADM

## 2024-06-03 RX ORDER — KETOROLAC TROMETHAMINE 15 MG/ML
15 INJECTION, SOLUTION INTRAMUSCULAR; INTRAVENOUS EVERY 6 HOURS
Qty: 5 ML | Refills: 0 | Status: COMPLETED | OUTPATIENT
Start: 2024-06-03 | End: 2024-06-04

## 2024-06-03 RX ORDER — ENALAPRILAT 1.25 MG/ML
1.25 INJECTION INTRAVENOUS EVERY 6 HOURS PRN
Status: DISCONTINUED | OUTPATIENT
Start: 2024-06-03 | End: 2024-06-05 | Stop reason: HOSPADM

## 2024-06-03 RX ORDER — LABETALOL HYDROCHLORIDE 5 MG/ML
5 INJECTION, SOLUTION INTRAVENOUS EVERY 10 MIN PRN
Status: DISCONTINUED | OUTPATIENT
Start: 2024-06-03 | End: 2024-06-03 | Stop reason: HOSPADM

## 2024-06-03 RX ORDER — NALOXONE HYDROCHLORIDE 1 MG/ML
0.2 INJECTION INTRAMUSCULAR; INTRAVENOUS; SUBCUTANEOUS
Status: DISCONTINUED | OUTPATIENT
Start: 2024-06-03 | End: 2024-06-03

## 2024-06-03 RX ORDER — MULTIVIT WITH IRON,MINERALS
1 TABLET,CHEWABLE ORAL 2 TIMES DAILY
Status: DISCONTINUED | OUTPATIENT
Start: 2024-06-04 | End: 2024-06-05 | Stop reason: HOSPADM

## 2024-06-03 RX ORDER — TRAMADOL HYDROCHLORIDE 50 MG/1
100 TABLET ORAL EVERY 6 HOURS PRN
Status: DISCONTINUED | OUTPATIENT
Start: 2024-06-03 | End: 2024-06-05 | Stop reason: HOSPADM

## 2024-06-03 RX ORDER — LIDOCAINE HYDROCHLORIDE 10 MG/ML
INJECTION, SOLUTION INFILTRATION; PERINEURAL PRN
Status: DISCONTINUED | OUTPATIENT
Start: 2024-06-03 | End: 2024-06-03

## 2024-06-03 RX ORDER — DEXTROSE MONOHYDRATE, SODIUM CHLORIDE, AND POTASSIUM CHLORIDE 50; 1.49; 4.5 G/1000ML; G/1000ML; G/1000ML
INJECTION, SOLUTION INTRAVENOUS CONTINUOUS
Status: DISCONTINUED | OUTPATIENT
Start: 2024-06-03 | End: 2024-06-05 | Stop reason: HOSPADM

## 2024-06-03 RX ORDER — ONDANSETRON 2 MG/ML
4 INJECTION INTRAMUSCULAR; INTRAVENOUS
Status: COMPLETED | OUTPATIENT
Start: 2024-06-03 | End: 2024-06-03

## 2024-06-03 RX ORDER — ACETAMINOPHEN 10 MG/ML
1000 INJECTION, SOLUTION INTRAVENOUS EVERY 6 HOURS
Status: DISCONTINUED | OUTPATIENT
Start: 2024-06-03 | End: 2024-06-05 | Stop reason: HOSPADM

## 2024-06-03 RX ORDER — NALBUPHINE HYDROCHLORIDE 20 MG/ML
2.5-5 INJECTION, SOLUTION INTRAMUSCULAR; INTRAVENOUS; SUBCUTANEOUS EVERY 6 HOURS PRN
Status: DISCONTINUED | OUTPATIENT
Start: 2024-06-03 | End: 2024-06-03

## 2024-06-03 RX ORDER — KETOROLAC TROMETHAMINE 30 MG/ML
15 INJECTION, SOLUTION INTRAMUSCULAR; INTRAVENOUS ONCE
Status: DISCONTINUED | OUTPATIENT
Start: 2024-06-03 | End: 2024-06-03 | Stop reason: HOSPADM

## 2024-06-03 RX ORDER — FENTANYL CITRATE 50 UG/ML
INJECTION, SOLUTION INTRAMUSCULAR; INTRAVENOUS PRN
Status: DISCONTINUED | OUTPATIENT
Start: 2024-06-03 | End: 2024-06-03

## 2024-06-03 RX ORDER — FENTANYL CITRATE 50 UG/ML
25-100 INJECTION, SOLUTION INTRAMUSCULAR; INTRAVENOUS
Status: DISCONTINUED | OUTPATIENT
Start: 2024-06-03 | End: 2024-06-03 | Stop reason: HOSPADM

## 2024-06-03 RX ADMIN — Medication 2 G: at 11:25

## 2024-06-03 RX ADMIN — NALBUPHINE HYDROCHLORIDE 5 MG: 20 INJECTION, SOLUTION INTRAMUSCULAR; INTRAVENOUS; SUBCUTANEOUS at 20:11

## 2024-06-03 RX ADMIN — ACETAMINOPHEN 1000 MG: 10 INJECTION, SOLUTION INTRAVENOUS at 14:53

## 2024-06-03 RX ADMIN — Medication 150 MCG: at 07:15

## 2024-06-03 RX ADMIN — SODIUM CHLORIDE, POTASSIUM CHLORIDE, SODIUM LACTATE AND CALCIUM CHLORIDE: 600; 310; 30; 20 INJECTION, SOLUTION INTRAVENOUS at 08:13

## 2024-06-03 RX ADMIN — ONDANSETRON 4 MG: 2 INJECTION INTRAMUSCULAR; INTRAVENOUS at 11:20

## 2024-06-03 RX ADMIN — ACETAMINOPHEN 975 MG: 325 TABLET ORAL at 06:26

## 2024-06-03 RX ADMIN — HEPARIN SODIUM 5000 UNITS: 5000 INJECTION, SOLUTION INTRAVENOUS; SUBCUTANEOUS at 07:39

## 2024-06-03 RX ADMIN — NALBUPHINE HYDROCHLORIDE 5 MG: 20 INJECTION, SOLUTION INTRAMUSCULAR; INTRAVENOUS; SUBCUTANEOUS at 14:32

## 2024-06-03 RX ADMIN — MIDAZOLAM HYDROCHLORIDE 1 MG: 1 INJECTION, SOLUTION INTRAMUSCULAR; INTRAVENOUS at 07:11

## 2024-06-03 RX ADMIN — ROCURONIUM BROMIDE 10 MG: 50 INJECTION, SOLUTION INTRAVENOUS at 11:01

## 2024-06-03 RX ADMIN — POTASSIUM CHLORIDE, DEXTROSE MONOHYDRATE AND SODIUM CHLORIDE: 150; 5; 450 INJECTION, SOLUTION INTRAVENOUS at 14:52

## 2024-06-03 RX ADMIN — PIPERACILLIN AND TAZOBACTAM 3.38 G: 3; .375 INJECTION, POWDER, FOR SOLUTION INTRAVENOUS at 21:04

## 2024-06-03 RX ADMIN — PROPOFOL 150 MG: 10 INJECTION, EMULSION INTRAVENOUS at 07:33

## 2024-06-03 RX ADMIN — SUGAMMADEX 200 MG: 100 INJECTION, SOLUTION INTRAVENOUS at 11:30

## 2024-06-03 RX ADMIN — DEXMEDETOMIDINE HYDROCHLORIDE 12 MCG: 100 INJECTION, SOLUTION INTRAVENOUS at 07:59

## 2024-06-03 RX ADMIN — ROCURONIUM BROMIDE 20 MG: 50 INJECTION, SOLUTION INTRAVENOUS at 10:17

## 2024-06-03 RX ADMIN — ONDANSETRON 4 MG: 2 INJECTION INTRAMUSCULAR; INTRAVENOUS at 06:32

## 2024-06-03 RX ADMIN — MIDAZOLAM HYDROCHLORIDE 1 MG: 1 INJECTION, SOLUTION INTRAMUSCULAR; INTRAVENOUS at 07:07

## 2024-06-03 RX ADMIN — ROCURONIUM BROMIDE 50 MG: 50 INJECTION, SOLUTION INTRAVENOUS at 07:33

## 2024-06-03 RX ADMIN — FENTANYL CITRATE 50 MCG: 50 INJECTION INTRAMUSCULAR; INTRAVENOUS at 07:33

## 2024-06-03 RX ADMIN — ROCURONIUM BROMIDE 30 MG: 50 INJECTION, SOLUTION INTRAVENOUS at 08:04

## 2024-06-03 RX ADMIN — MAGNESIUM SULFATE HEPTAHYDRATE 4 G: 80 INJECTION, SOLUTION INTRAVENOUS at 06:35

## 2024-06-03 RX ADMIN — LIDOCAINE HYDROCHLORIDE 5 ML: 10 INJECTION, SOLUTION INFILTRATION; PERINEURAL at 07:33

## 2024-06-03 RX ADMIN — ROCURONIUM BROMIDE 20 MG: 50 INJECTION, SOLUTION INTRAVENOUS at 08:57

## 2024-06-03 RX ADMIN — PIPERACILLIN AND TAZOBACTAM 3.38 G: 3; .375 INJECTION, POWDER, FOR SOLUTION INTRAVENOUS at 14:57

## 2024-06-03 RX ADMIN — DEXAMETHASONE SODIUM PHOSPHATE 10 MG: 10 INJECTION, SOLUTION INTRAMUSCULAR; INTRAVENOUS at 07:47

## 2024-06-03 RX ADMIN — DEXMEDETOMIDINE HYDROCHLORIDE 8 MCG: 100 INJECTION, SOLUTION INTRAVENOUS at 11:45

## 2024-06-03 RX ADMIN — SODIUM CHLORIDE, POTASSIUM CHLORIDE, SODIUM LACTATE AND CALCIUM CHLORIDE: 600; 310; 30; 20 INJECTION, SOLUTION INTRAVENOUS at 06:28

## 2024-06-03 RX ADMIN — KETOROLAC TROMETHAMINE 15 MG: 15 INJECTION, SOLUTION INTRAMUSCULAR; INTRAVENOUS at 21:04

## 2024-06-03 RX ADMIN — PROPOFOL 30 MCG/KG/MIN: 10 INJECTION, EMULSION INTRAVENOUS at 07:33

## 2024-06-03 RX ADMIN — FENTANYL CITRATE 50 MCG: 50 INJECTION INTRAMUSCULAR; INTRAVENOUS at 07:40

## 2024-06-03 RX ADMIN — ACETAMINOPHEN 975 MG: 325 SOLUTION ORAL at 21:04

## 2024-06-03 RX ADMIN — FENTANYL CITRATE 50 MCG: 50 INJECTION, SOLUTION INTRAMUSCULAR; INTRAVENOUS at 07:06

## 2024-06-03 RX ADMIN — HYDROMORPHONE HYDROCHLORIDE 0.5 MG: 1 INJECTION, SOLUTION INTRAMUSCULAR; INTRAVENOUS; SUBCUTANEOUS at 08:19

## 2024-06-03 RX ADMIN — KETOROLAC TROMETHAMINE 15 MG: 15 INJECTION, SOLUTION INTRAMUSCULAR; INTRAVENOUS at 14:57

## 2024-06-03 RX ADMIN — PROPOFOL 50 MG: 10 INJECTION, EMULSION INTRAVENOUS at 07:40

## 2024-06-03 RX ADMIN — FAMOTIDINE 20 MG: 10 INJECTION, SOLUTION INTRAVENOUS at 06:29

## 2024-06-03 RX ADMIN — POTASSIUM CHLORIDE, DEXTROSE MONOHYDRATE AND SODIUM CHLORIDE: 150; 5; 450 INJECTION, SOLUTION INTRAVENOUS at 23:31

## 2024-06-03 RX ADMIN — DEXMEDETOMIDINE HYDROCHLORIDE 4 MCG: 100 INJECTION, SOLUTION INTRAVENOUS at 09:56

## 2024-06-03 RX ADMIN — DEXMEDETOMIDINE HYDROCHLORIDE 8 MCG: 100 INJECTION, SOLUTION INTRAVENOUS at 08:47

## 2024-06-03 RX ADMIN — ENOXAPARIN SODIUM 40 MG: 40 INJECTION SUBCUTANEOUS at 21:04

## 2024-06-03 RX ADMIN — Medication 2 G: at 07:30

## 2024-06-03 RX ADMIN — DEXMEDETOMIDINE HYDROCHLORIDE 8 MCG: 100 INJECTION, SOLUTION INTRAVENOUS at 08:16

## 2024-06-03 RX ADMIN — HYDROMORPHONE HYDROCHLORIDE 0.5 MG: 1 INJECTION, SOLUTION INTRAMUSCULAR; INTRAVENOUS; SUBCUTANEOUS at 08:24

## 2024-06-03 ASSESSMENT — LIFESTYLE VARIABLES: TOBACCO_USE: 1

## 2024-06-03 ASSESSMENT — ACTIVITIES OF DAILY LIVING (ADL)
ADLS_ACUITY_SCORE: 20
ADLS_ACUITY_SCORE: 20
ADLS_ACUITY_SCORE: 23
ADLS_ACUITY_SCORE: 23
ADLS_ACUITY_SCORE: 20
ADLS_ACUITY_SCORE: 23
ADLS_ACUITY_SCORE: 20
ADLS_ACUITY_SCORE: 24
ADLS_ACUITY_SCORE: 20
ADLS_ACUITY_SCORE: 24
ADLS_ACUITY_SCORE: 24
ADLS_ACUITY_SCORE: 23
ADLS_ACUITY_SCORE: 23
ADLS_ACUITY_SCORE: 20
ADLS_ACUITY_SCORE: 23
ADLS_ACUITY_SCORE: 23

## 2024-06-03 NOTE — PHARMACY-ADMISSION MEDICATION HISTORY
Pharmacist Admission Medication History    Admission medication history is complete. The information provided in this note is only as accurate as the sources available at the time of the update.    Information Source(s): Patient via in-person    Pertinent Information: Patient would like any meds sent to [facility] [place]. Patient takes her daily meds in the early afternoon.    Changes made to PTA medication list:  Added: None  Deleted: Ibuprofen  Changed: Clonazepam - added frequency. Hydroxyzine - added frequency and PRN dosing.    Allergies reviewed with patient and updates made in EHR: yes    Medication History Completed By: Jonathan Lerma [facility] 6/3/2024 7:00 AM    PTA Med List   Medication Sig Last Dose    acetaminophen (TYLENOL) 500 MG tablet [ACETAMINOPHEN (TYLENOL) 500 MG TABLET] Take 1,000 mg by mouth every 6 (six) hours as needed for pain.  at PRN    buPROPion (WELLBUTRIN XL) 150 MG 24 hr tablet Take 1 tablet by mouth every morning 6/2/2024 at AFT    clonazePAM (KLONOPIN) 1 MG tablet Take 2 mg by mouth nightly as needed for sleep 6/2/2024 at HS    [START ON 6/4/2024] cyanocobalamin (VITAMIN B-12) 1000 MCG sublingual tablet Place 1 tablet (1,000 mcg) under the tongue daily Start right after surgery.  at Not Started    fluticasone propionate (FLONASE) 50 mcg/actuation nasal spray [FLUTICASONE PROPIONATE (FLONASE) 50 MCG/ACTUATION NASAL SPRAY] 2 sprays into each nostril.  at PRN    hydrOXYzine HCL (ATARAX) 25 MG tablet Take 1 tablet by mouth daily at bedtime. May take one tablet as needed for anxiety 6/2/2024 at HS    [START ON 6/4/2024] omeprazole (PRILOSEC) 20 MG DR capsule Take 1 capsule (20 mg) by mouth daily for 90 days Start day after surgery, open contents and sprinkle on food for first 6 weeks. Take daily for 3 months after surgery.  at Not started yet    pantoprazole (PROTONIX) 40 MG EC tablet Take 1 tablet by mouth daily 6/2/2024 at AFT    [START ON 6/4/2024] Pediatric Multivitamins-Iron  (MULTIVITAMINS PLUS IRON CHILD) 18 MG CHEW Take 1 chew tab by mouth 2 times daily Ok to substitute with any chewable that contains 18 mg of iron, Vitamin A, Thiamine and Zinc.  at Not started    venlafaxine (EFFEXOR-XR) 150 MG 24 hr capsule [VENLAFAXINE (EFFEXOR-XR) 150 MG 24 HR CAPSULE] Take by mouth. 6/2/2024 at AFT

## 2024-06-03 NOTE — PLAN OF CARE
"  Problem: Adult Inpatient Plan of Care  Goal: Plan of Care Review  Description: The Plan of Care Review/Shift note should be completed every shift.  The Outcome Evaluation is a brief statement about your assessment that the patient is improving, declining, or no change.  This information will be displayed automatically on your shift  note.  Outcome: Progressing   Goal Outcome Evaluation:      Patient arrived on P2 @ 1315 via cart, and AMB into room to bed.   Remains very lethargic and drowsy. Unable to begin sips at this time.  Denies abdominal pain. Has not voided yet. Dressings CDI. Reporting \"Itchiness\" and BP/Pulse elevated. Dr. Rizo came on unit and was updated.                 "

## 2024-06-03 NOTE — ANESTHESIA POSTPROCEDURE EVALUATION
Patient: Pinky Post    Procedure: Procedure(s):  CREATION, GASTRIC BYPASS, LISA-EN-Y, LAPAROSCOPIC, EXTENSIVE LYSIS OF ADHESIONS       Anesthesia Type:  General    Note:  Disposition: Inpatient; Admission   Postop Pain Control: Uneventful            Sign Out: Well controlled pain   PONV: No   Neuro/Psych: Uneventful            Sign Out: Acceptable/Baseline neuro status   Airway/Respiratory: Uneventful            Sign Out: Acceptable/Baseline resp. status   CV/Hemodynamics: Uneventful            Sign Out: Acceptable CV status; No obvious hypovolemia; No obvious fluid overload   Other NRE: NONE   DID A NON-ROUTINE EVENT OCCUR? No           Last vitals:  Vitals Value Taken Time   /95 06/03/24 1245   Temp 36.9  C (98.4  F) 06/03/24 1229   Pulse 115 06/03/24 1248   Resp 14 06/03/24 1245   SpO2 94 % 06/03/24 1248   Vitals shown include unfiled device data.    Electronically Signed By: Peyton Martin MD  Lizette 3, 2024  2:36 PM

## 2024-06-03 NOTE — ANESTHESIA CARE TRANSFER NOTE
Patient: Pinky Post    Procedure: Procedure(s):  CREATION, GASTRIC BYPASS, LISA-EN-Y, LAPAROSCOPIC, EXTENSIVE LYSIS OF ADHESIONS       Diagnosis: Obesity, Class III, BMI 40-49.9 (morbid obesity) (H) [E66.01]  Gastroesophageal reflux disease without esophagitis [K21.9]  Diagnosis Additional Information: No value filed.    Anesthesia Type:   General     Note:    Oropharynx: oropharynx clear of all foreign objects  Level of Consciousness: awake and drowsy  Oxygen Supplementation: face mask  Level of Supplemental Oxygen (L/min / FiO2): 6  Independent Airway: airway patency satisfactory and stable  Dentition: dentition unchanged  Vital Signs Stable: post-procedure vital signs reviewed and stable  Report to RN Given: handoff report given  Patient transferred to: PACU    Handoff Report: Identifed the Patient, Identified the Reponsible Provider, Reviewed the pertinent medical history, Discussed the surgical course, Reviewed Intra-OP anesthesia mangement and issues during anesthesia, Set expectations for post-procedure period and Allowed opportunity for questions and acknowledgement of understanding    Vitals:  Vitals Value Taken Time   /97 06/03/24 1147   Temp 99.0F    Pulse 104 06/03/24 1150   Resp 20 06/03/24 1150   SpO2 95 % 06/03/24 1150   Vitals shown include unfiled device data.    Electronically Signed By: CARLI Joseph Jr, CRNA  Lizette 3, 2024  11:51 AM

## 2024-06-03 NOTE — H&P
HISTORY AND PHYSICAL UPDATE:    I have assessed the patient and evaluated the chart and and verify the patient's clinical status has not changed since our last documentation.  The patient is ready to move forward with the planned surgery.  Lungs: Clear to auscultation  Heart: Regular rate and rhythm    HPI: Pinky Post is a 54 year old female here today for consideration of metabolic and bariatric surgery.       Weight Loss History Reviewed with Patient   How long have you been overweight? Since early childhood   What is the most that you have ever weighed 236   What is the most weight you have lost? 60   I have tried the following methods to lose weight Watching portions or calories    Exercise    Weight Watchers    Atkins type diet (low carb/high protein)    Yadira Driver    Pre packaged meals ex: [employer]concert    [employer]    Physician directed program   I have tried the following weight loss medications? (Check all that apply) None       This pt does not have Hypertention.   The pt has GERD which is controlled with meds.   The pt does not have Sleep Apnea.   This pt does not have diabetes.  .    She is interested in having a RNY GBP but she has a history of tobacco abuse.     Allergies:Escitalopram, Gabapentin, and Nortriptyline     Past Medical History        Past Medical History:   Diagnosis Date    Anxiety      Depression      Dyslipidemia      Fatigue      Fibromyalgia 01/24/2024    Gastroesophageal reflux disease      Morbid obesity (H)      Morbid obesity (H) 01/24/2024    Multiple joint pain      PONV (postoperative nausea and vomiting)      Tobacco use disorder              Past Surgical History         Past Surgical History:   Procedure Laterality Date    ABDOMINOPLASTY        BREAST SURGERY        CHOLECYSTECTOMY        CREATION, VAGINAL WALL SLING N/A 9/28/2023     Procedure: TRANSVAGINAL TAPE SLING, HYSTEROSCOPY WITH DILATION AND CURETTAGE WITH ENDOMETRIAL ABLATION;  Surgeon: Nader Deal,  "MD;  Location: [facility]    CYSTOURETHROSCOPY N/A 3/30/2021     Procedure: CYSTOSCOPY WITH BLADDER HYDRODYSTENSION, , PUDENDAL NERVE BLOCK;  Surgeon: Sheila Lewis MD;  Location: [facility];  Service: Gynecology    OVARIAN CYST REMOVAL        TONSILLECTOMY                CURRENT MEDS:  Inpatient Administered Meds   No current facility-administered medications for this visit.            Family History   No family history on file.         reports that she quit smoking about 3 months ago. Her smoking use included cigarettes. She started smoking about 24 years ago. She has a 24 pack-year smoking history. She has never used smokeless tobacco. She reports that she does not currently use alcohol. She reports that she does not currently use drugs.     Review of Systems - 12 point Review of Systems is negative except for the issues mentioned above.     PSYCHIATRIC: she has undergone a lifestyle assessment and has been deemed a good candidate for bariatric surgery by the psychologist.     Vitals: BP (!) 168/75   Pulse 89   Temp 97.6  F (36.4  C) (Oral)   Resp 16   Ht 1.626 m (5' 4\")   Wt 106.3 kg (234 lb 6.4 oz)   SpO2 99%   BMI 40.23 kg/m    BMI= Body mass index is 40.23 kg/m .      EXAM:  GENERAL: This is a well-developed 54 year old female who appears her stated age  HEAD & NECK: Grossly normal.  No palpable thyroid lesions  CARDIAC: RRR without murmur  CHEST/LUNG:  Clear to auscultation  ABDOMEN: Obese.  Nontender.  No hernias or masses appreciated.  LYMPHATIC:  No significant adenopathy appreciated.    EXTREMITIES: Grossly normal.  No evidence of chronic venous stasis.    NEUROLOGIC: Focally intact  INTEGUMENT: No open lesions or ulcers  PSYCHIATRIC: Normal affect. she has a good grasp on the nature of her obesity and the treatment options.     LABS:        Lab Results   Component Value Date     WBC 9.1 01/24/2024     HGB 12.8 01/24/2024     HCT 39.9 01/24/2024     MCV 92 01/24/2024    "   01/24/2024        Component  Ref Range & Units 3 mo ago      Hemoglobin A1C  0.0 - 5.6 % 5.4        Component  Ref Range & Units 3 mo ago 7 yr ago 8 yr ago 9 yr ago      Sodium  135 - 145 mmol/L 137         Comment: Reference intervals for this test were updated on 09/26/2023 to more accurately reflect our healthy population. There may be differences in the flagging of prior results with similar values performed with this method. Interpretation of those prior results can be made in the context of the updated reference intervals.    Potassium  3.4 - 5.3 mmol/L 4.0          Carbon Dioxide (CO2)  22 - 29 mmol/L 23          Anion Gap  7 - 15 mmol/L 10          Urea Nitrogen  6.0 - 20.0 mg/dL 18.8          Creatinine  0.51 - 0.95 mg/dL 0.71 0.70 VC, R, CM 0.72 R, CM 0.69 R, CM    GFR Estimate  >60 mL/min/1.73m2 >90          Calcium  8.6 - 10.0 mg/dL 9.0          Chloride  98 - 107 mmol/L 104          Glucose  70 - 99 mg/dL 98          Alkaline Phosphatase  40 - 150 U/L 67         Comment: Reference intervals for this test were updated on 11/14/2023 to more accurately reflect our healthy population. There may be differences in the flagging of prior results with similar values performed with this method. Interpretation of those prior results can be made in the context of the updated reference intervals.    AST  0 - 45 U/L 25         Comment: Reference intervals for this test were updated on 6/12/2023 to more accurately reflect our healthy population. There may be differences in the flagging of prior results with similar values performed with this method. Interpretation of those prior results can be made in the context of the updated reference intervals.    ALT  0 - 50 U/L 38         Comment: Reference intervals for this test were updated on 6/12/2023 to more accurately reflect our healthy population. There may be differences in the flagging of prior results with similar values performed with this method.  Interpretation of those prior results can be made in the context of the updated reference intervals.      Protein Total  6.4 - 8.3 g/dL 6.9          Albumin  3.5 - 5.2 g/dL 4.2          Bilirubin Total  <=1.2 mg/dL <0.2         Resulting Agency [facility]                  Assessment/Plan: 54 year old female who is an excellent candidate for bariatric and metabolic surgery.  After a careful conversation with the patient it was decided that a  Laparoscopic Farheen-en-Y Gastric Bypass would be her best option. She has a history of smoking and we discussed in detail that a RNY-GBP is an ABSOLUTE CONTRAINDICATION for smoking.    Arnold Rizo  [facility]; surgeons  [phone]

## 2024-06-03 NOTE — ANESTHESIA PROCEDURE NOTES
"Intrathecal injection Procedure Note    Pre-Procedure   Staff -        Anesthesiologist:  Peyton Martin MD       Performed By: anesthesiologist       Location: pre-op       Procedure Start/Stop Times: 6/3/2024 7:10 AM and 6/3/2024 7:15 AM       Pre-Anesthestic Checklist: patient identified, IV checked, risks and benefits discussed, informed consent, monitors and equipment checked, pre-op evaluation, at physician/surgeon's request and post-op pain management  Timeout:       Correct Patient: Yes        Correct Procedure: Yes        Correct Site: Yes        Correct Position: Yes   Procedure Documentation  Procedure: intrathecal injection       Patient Position: sitting       Patient Prep/Sterile Barriers: sterile gloves, mask, patient draped       Skin prep: Chloraprep       Insertion Site: L3-4. (midline approach).       Needle Gauge: 24.        Needle Length (Inches): 4        Spinal Needle Type: Pencan       Introducer used       # of attempts: 2 and  # of redirects:     Assessment/Narrative         Paresthesias: No.       CSF fluid: clear.    Medication(s) Administered   Medication Administration Time: 6/3/2024 7:10 AM      FOR [facility] ([facility] ONLY:   Pain Team Contact information: please page the Pain Team Via [employer]. Search \"Pain\". During daytime hours, please page the attending first. At night please page the resident first.      "

## 2024-06-03 NOTE — ANESTHESIA PROCEDURE NOTES
Airway       Patient location during procedure: OR       Procedure Start/Stop Times: 6/3/2024 7:35 AM  Staff -        Anesthesiologist:  Salina Yoder MD       CRNA: Shayy Mccloud APRN CRNA       Performed By: anesthesiologistIndications and Patient Condition       Indications for airway management: mike-procedural       Induction type:intravenous       Mask difficulty assessment: 2 - vent by mask + OA or adjuvant +/- NMBA    Final Airway Details       Final airway type: endotracheal airway       Successful airway: ETT - single  Endotracheal Airway Details        ETT size (mm): 7.5       Cuffed: yes       Successful intubation technique: direct laryngoscopy       DL Blade Type: Magallon 2       Grade View of Cords: 1       Adjucts: stylet       Position: Right       Measured from: lips       Secured at (cm): 21       Bite block used: None    Post intubation assessment        Placement verified by: capnometry, equal breath sounds and chest rise        Number of attempts at approach: 1       Number of other approaches attempted: 0       Secured with: tape       Ease of procedure: easy       Dentition: Intact and Unchanged       Dental guard used and removed. Dental Guard Type: Standard White.    Medication(s) Administered   Medication Administration Time: 6/3/2024 7:35 AM

## 2024-06-03 NOTE — ANESTHESIA PREPROCEDURE EVALUATION
Anesthesia Pre-Procedure Evaluation    Patient: Pinky Post   MRN: [id number] : 1970        Procedure : Procedure(s):  CREATION, GASTRIC BYPASS, LISA-EN-Y, LAPAROSCOPIC          Past Medical History:   Diagnosis Date    Adrenal nodule (H24)     Anxiety     Benzodiazepine dependence (H)     Cholecystitis     Depression     Dyslipidemia     Fatigue     Fibromyalgia 2024    Gastroesophageal reflux disease     Migraine     Morbid obesity (H)     Morbid obesity (H) 2024    Multiple joint pain     PTSD (post-traumatic stress disorder)     Tobacco use disorder       Past Surgical History:   Procedure Laterality Date    ABDOMINOPLASTY      BREAST SURGERY      CHOLECYSTECTOMY      COLONOSCOPY      CREATION, VAGINAL WALL SLING N/A 2023    Procedure: TRANSVAGINAL TAPE SLING, HYSTEROSCOPY WITH DILATION AND CURETTAGE WITH ENDOMETRIAL ABLATION;  Surgeon: Nader Deal MD;  Location: [facility]    CYSTOURETHROSCOPY N/A 2021    Procedure: CYSTOSCOPY WITH BLADDER HYDRODYSTENSION, , PUDENDAL NERVE BLOCK;  Surgeon: Sheila Lewis MD;  Location: [facility];  Service: Gynecology    OVARIAN CYST REMOVAL      TONSILLECTOMY      WISDOM TOOTH EXTRACTION        Allergies   Allergen Reactions    Escitalopram Hives and Itching    Gabapentin Other (See Comments)    Nortriptyline Other (See Comments)     Migraine at 50 mg dose    Varenicline Anxiety      Social History     Tobacco Use    Smoking status: Former     Current packs/day: 0.00     Average packs/day: 1 pack/day for 24.0 years (24.0 ttl pk-yrs)     Types: Cigarettes     Start date: 2000     Quit date: 2024     Years since quittin.3    Smokeless tobacco: Never    Tobacco comments:     Age 22. Quit for 7 years at one time. 24yrs total up to 1ppd   Substance Use Topics    Alcohol use: Not Currently     Alcohol/week: 0.0 - 4.0 standard drinks of alcohol     Comment: 0-4      Wt Readings from Last 1  "Encounters:   06/03/24 106.3 kg (234 lb 6.4 oz)        Anesthesia Evaluation   Pt has had prior anesthetic.     No history of anesthetic complications       ROS/MED HX  ENT/Pulmonary:     (+)                tobacco use, Past use,                       Neurologic:     (+)    no peripheral neuropathy  migraines,                          Cardiovascular:     (+) Dyslipidemia - -   -  - -                                      METS/Exercise Tolerance: >4 METS    Hematologic:  - neg hematologic  ROS     Musculoskeletal:       GI/Hepatic:     (+) GERD,                   Renal/Genitourinary:  - neg Renal ROS     Endo:     (+)               Obesity,       Psychiatric/Substance Use:     (+) psychiatric history anxiety and depression (On multiple medications - UPDATE: Off Amytripltaline)       Infectious Disease:  - neg infectious disease ROS     Malignancy:    (-) malignancy   Other:      (+)  , H/O Chronic Pain,         Physical Exam    Airway        Mallampati: IV   TM distance: > 3 FB   Neck ROM: full   Mouth opening: > 3 cm    Respiratory Devices and Support         Dental       (+) Minor Abnormalities - some fillings, tiny chips      Cardiovascular          Rhythm and rate: regular     Pulmonary           breath sounds clear to auscultation           OUTSIDE LABS:  CBC:   Lab Results   Component Value Date    WBC 9.1 01/24/2024    HGB 12.8 01/24/2024    HCT 39.9 01/24/2024     01/24/2024     BMP:   Lab Results   Component Value Date     01/24/2024    POTASSIUM 4.0 01/24/2024    CHLORIDE 104 01/24/2024    CO2 23 01/24/2024    BUN 18.8 01/24/2024    CR 0.71 01/24/2024    CR 0.70 05/23/2016     (H) 06/03/2024    GLC 98 01/24/2024     COAGS: No results found for: \"PTT\", \"INR\", \"FIBR\"  POC: No results found for: \"BGM\", \"HCG\", \"HCGS\"  HEPATIC:   Lab Results   Component Value Date    ALBUMIN 4.2 01/24/2024    PROTTOTAL 6.9 01/24/2024    ALT 38 01/24/2024    AST 25 01/24/2024    ALKPHOS 67 01/24/2024    " "BILITOTAL <0.2 01/24/2024     OTHER:   Lab Results   Component Value Date    A1C 5.4 01/24/2024    CIERA 9.0 01/24/2024    TSH 2.02 01/24/2024       Anesthesia Plan    ASA Status:  2    NPO Status:  NPO Appropriate    Anesthesia Type: General.     - Airway: ETT   Induction: Intravenous, Propofol.   Maintenance: Balanced.        Consents    Anesthesia Plan(s) and associated risks, benefits, and realistic alternatives discussed. Questions answered and patient/representative(s) expressed understanding.     - Discussed:     - Discussed with:  Patient       - Patient is DNR/DNI Status: No          Postoperative Care    Pain management: intrathecal morphine, Multi-modal analgesia.   PONV prophylaxis: Ondansetron (or other 5HT-3), Dexamethasone or Solumedrol     Comments:               Peyton Martin MD                # Severe Obesity: Estimated body mass index is 40.23 kg/m  as calculated from the following:    Height as of this encounter: 1.626 m (5' 4\").    Weight as of this encounter: 106.3 kg (234 lb 6.4 oz).      "

## 2024-06-04 LAB
ANION GAP SERPL CALCULATED.3IONS-SCNC: 10 MMOL/L (ref 7–15)
BUN SERPL-MCNC: 15.6 MG/DL (ref 6–20)
CALCIUM SERPL-MCNC: 8.3 MG/DL (ref 8.6–10)
CHLORIDE SERPL-SCNC: 100 MMOL/L (ref 98–107)
CREAT SERPL-MCNC: 0.87 MG/DL (ref 0.51–0.95)
DEPRECATED HCO3 PLAS-SCNC: 22 MMOL/L (ref 22–29)
EGFRCR SERPLBLD CKD-EPI 2021: 80 ML/MIN/1.73M2
ERYTHROCYTE [DISTWIDTH] IN BLOOD BY AUTOMATED COUNT: 13.2 % (ref 10–15)
GLUCOSE SERPL-MCNC: 172 MG/DL (ref 70–99)
HCT VFR BLD AUTO: 36.4 % (ref 35–47)
HGB BLD-MCNC: 12 G/DL (ref 11.7–15.7)
MCH RBC QN AUTO: 30.1 PG (ref 26.5–33)
MCHC RBC AUTO-ENTMCNC: 33 G/DL (ref 31.5–36.5)
MCV RBC AUTO: 91 FL (ref 78–100)
PLATELET # BLD AUTO: 226 10E3/UL (ref 150–450)
POTASSIUM SERPL-SCNC: 5.2 MMOL/L (ref 3.4–5.3)
RBC # BLD AUTO: 3.99 10E6/UL (ref 3.8–5.2)
SODIUM SERPL-SCNC: 132 MMOL/L (ref 135–145)
WBC # BLD AUTO: 17.8 10E3/UL (ref 4–11)

## 2024-06-04 PROCEDURE — 36415 COLL VENOUS BLD VENIPUNCTURE: CPT | Performed by: NURSE PRACTITIONER

## 2024-06-04 PROCEDURE — 80048 BASIC METABOLIC PNL TOTAL CA: CPT | Performed by: NURSE PRACTITIONER

## 2024-06-04 PROCEDURE — 250N000011 HC RX IP 250 OP 636: Performed by: SURGERY

## 2024-06-04 PROCEDURE — 120N000001 HC R&B MED SURG/OB

## 2024-06-04 PROCEDURE — 250N000013 HC RX MED GY IP 250 OP 250 PS 637: Performed by: NURSE PRACTITIONER

## 2024-06-04 PROCEDURE — 250N000011 HC RX IP 250 OP 636: Performed by: NURSE PRACTITIONER

## 2024-06-04 PROCEDURE — 85027 COMPLETE CBC AUTOMATED: CPT | Performed by: NURSE PRACTITIONER

## 2024-06-04 RX ORDER — HYDROXYZINE HCL 10 MG/5 ML
25 SOLUTION, ORAL ORAL EVERY 6 HOURS PRN
Status: DISCONTINUED | OUTPATIENT
Start: 2024-06-04 | End: 2024-06-05 | Stop reason: HOSPADM

## 2024-06-04 RX ADMIN — DIPHENHYDRAMINE HYDROCHLORIDE 25 MG: 50 INJECTION, SOLUTION INTRAMUSCULAR; INTRAVENOUS at 09:19

## 2024-06-04 RX ADMIN — VENLAFAXINE 50 MG: 25 TABLET ORAL at 09:15

## 2024-06-04 RX ADMIN — DIPHENHYDRAMINE HYDROCHLORIDE 25 MG: 50 INJECTION, SOLUTION INTRAMUSCULAR; INTRAVENOUS at 15:29

## 2024-06-04 RX ADMIN — BUPROPION HYDROCHLORIDE 75 MG: 75 TABLET, FILM COATED ORAL at 09:15

## 2024-06-04 RX ADMIN — BUPROPION HYDROCHLORIDE 75 MG: 75 TABLET, FILM COATED ORAL at 21:10

## 2024-06-04 RX ADMIN — KETOROLAC TROMETHAMINE 15 MG: 15 INJECTION, SOLUTION INTRAMUSCULAR; INTRAVENOUS at 15:29

## 2024-06-04 RX ADMIN — Medication 1 TABLET: at 09:14

## 2024-06-04 RX ADMIN — DIPHENHYDRAMINE HYDROCHLORIDE 25 MG: 50 INJECTION, SOLUTION INTRAMUSCULAR; INTRAVENOUS at 00:45

## 2024-06-04 RX ADMIN — ACETAMINOPHEN 975 MG: 325 SOLUTION ORAL at 10:07

## 2024-06-04 RX ADMIN — ACETAMINOPHEN 975 MG: 325 SOLUTION ORAL at 21:10

## 2024-06-04 RX ADMIN — TRAMADOL HYDROCHLORIDE 100 MG: 50 TABLET, COATED ORAL at 14:37

## 2024-06-04 RX ADMIN — Medication 1000 MCG: at 09:15

## 2024-06-04 RX ADMIN — VENLAFAXINE 50 MG: 25 TABLET ORAL at 17:17

## 2024-06-04 RX ADMIN — KETOROLAC TROMETHAMINE 15 MG: 15 INJECTION, SOLUTION INTRAMUSCULAR; INTRAVENOUS at 09:19

## 2024-06-04 RX ADMIN — OMEPRAZOLE 20 MG: 20 CAPSULE, DELAYED RELEASE ORAL at 09:14

## 2024-06-04 RX ADMIN — NALBUPHINE HYDROCHLORIDE 5 MG: 20 INJECTION, SOLUTION INTRAMUSCULAR; INTRAVENOUS; SUBCUTANEOUS at 17:17

## 2024-06-04 RX ADMIN — NALBUPHINE HYDROCHLORIDE 5 MG: 20 INJECTION, SOLUTION INTRAMUSCULAR; INTRAVENOUS; SUBCUTANEOUS at 02:19

## 2024-06-04 RX ADMIN — ACETAMINOPHEN 975 MG: 325 SOLUTION ORAL at 02:35

## 2024-06-04 RX ADMIN — ACETAMINOPHEN 975 MG: 325 SOLUTION ORAL at 14:30

## 2024-06-04 RX ADMIN — NALBUPHINE HYDROCHLORIDE 5 MG: 20 INJECTION, SOLUTION INTRAMUSCULAR; INTRAVENOUS; SUBCUTANEOUS at 12:07

## 2024-06-04 RX ADMIN — Medication 1 TABLET: at 21:10

## 2024-06-04 RX ADMIN — VENLAFAXINE 50 MG: 25 TABLET ORAL at 14:40

## 2024-06-04 RX ADMIN — NALBUPHINE HYDROCHLORIDE 5 MG: 20 INJECTION, SOLUTION INTRAMUSCULAR; INTRAVENOUS; SUBCUTANEOUS at 23:39

## 2024-06-04 RX ADMIN — KETOROLAC TROMETHAMINE 15 MG: 15 INJECTION, SOLUTION INTRAMUSCULAR; INTRAVENOUS at 02:36

## 2024-06-04 RX ADMIN — ENOXAPARIN SODIUM 40 MG: 40 INJECTION SUBCUTANEOUS at 21:09

## 2024-06-04 RX ADMIN — PIPERACILLIN AND TAZOBACTAM 3.38 G: 3; .375 INJECTION, POWDER, FOR SOLUTION INTRAVENOUS at 04:45

## 2024-06-04 ASSESSMENT — ACTIVITIES OF DAILY LIVING (ADL)
ADLS_ACUITY_SCORE: 23

## 2024-06-04 NOTE — PLAN OF CARE
Alert and oriented. Up and walking to bathroom and damian independently. Pt. Itchy on back, benadryl little effect. Waiting for pharmacy to send Nubain. Tolerating sips, slowing down when feeling full. Taught pt. How to use pill cutter. Drinking protein shake. Abdominal binder on.  at bedside. Voiding well. Saline locked IV. Itching continuing. Contacted provider about increasing frequency.

## 2024-06-04 NOTE — CARE PLAN
Care Plan Progress Note:    Name: Pinky Post  :   1970  MRN:   [id number]    Problem: Bariatric Procedure  Goal: Prevent post-op bariatric complications.  Appropriate oral intake.  Discharge needs are met.  Intervention:   Post Op Day 0/1 (progress as patient tolerates)   -Notify MD of excessive nausea   -NPO per MD orders; read exceptions to the order   -Toothette with 1/2 inch warm water at bedside until able to take PO   -Do not give ice chips, straws, or carbonation    -Whenever drinking liquids, patient must be upright with both feet on the floor   -No more than 30mL per sip   -All liquids must be at approximately room temperature (no extreme temperatures).   -After 2 hours of 30mL PO q30min, you may take 30mL as tolerated and advance to a clear liquid diet    -No oral pills until after the patient tolerates the 2 hours of 30mL sips (exceptions: sublingual medications can be given anytime; liquid gabapentin can be given after 1 hours of sips)   -Strict intake and output   -Bladder scan every 4 hours until voiding freely   -If tolerating sips, start bariatric clear liquid diet   -If tolerating bariatric clears, advance to a bariatric full liquid diet  Discharge Planning   -Educate patient about pill size   -Patient should take no pill greater than 1/4 inch   -Send pill cutter home with patient   -Nurse to review home discharge instructions  Outcome:    Shift  Intake:450cc(PO);FVI=624vn  Output:550cc  Bladder Scan: na  Pain:Managed by scheduled TY and Toradol.   Incision:cdi except for geovani dressing(it was leaking subsequently changed once)  Nausea:denies  Activity:Ambulate in the hallways.  Incentive Spirometry:reached to 3000ml  Abdominal Binder:available at bed side. Reminded to use it.    Mahsa Milian, RN  2024  12:07 AM

## 2024-06-04 NOTE — PLAN OF CARE
Goal Outcome Evaluation:       .    Care Plan Progress Note:    Name: Pinky Post  :   1970  MRN:   [id number]    Problem: Bariatric Procedure  Goal: Prevent post-op bariatric complications.  Appropriate oral intake.  Discharge needs are met.  Intervention:   Post Op Day 0/1 (progress as patient tolerates)   -Notify MD of excessive nausea   -NPO per MD orders; read exceptions to the order   -Toothette with 1/2 inch warm water at bedside until able to take PO   -Do not give ice chips, straws, or carbonation    -Whenever drinking liquids, patient must be upright with both feet on the floor   -No more than 30mL per sip   -All liquids must be at approximately room temperature (no extreme temperatures).   -After 2 hours of 30mL PO q30min, you may take 30mL as tolerated and advance to a clear liquid diet    -No oral pills until after the patient tolerates the 2 hours of 30mL sips (exceptions: sublingual medications can be given anytime; liquid gabapentin can be given after 1 hours of sips)   -Strict intake and output   -Bladder scan every 4 hours until voiding freely   -If tolerating sips, start bariatric clear liquid diet   -If tolerating bariatric clears, advance to a bariatric full liquid diet  Discharge Planning   -Educate patient about pill size   -Patient should take no pill greater than 1/4 inch   -Send pill cutter home with patient   -Nurse to review home discharge instructions  Outcome:    Shift  Intake: 150ml sips, ivf: 992  Output: 700  Bladder Scan: na  Pain: given scheduled Tylenol and Toradol  Incision: CDI, small drainage. JONES leaking at site  Nausea: Denies  Activity: up to the bathroom 2x  Incentive Spirometry: using independently  Abdominal Binder: Available at bedside    Kareem Mancuso RN  2024  6:24 AM

## 2024-06-04 NOTE — PROGRESS NOTES
General Surgery Progress Note    POST OP DAY  # 1, status post Laparoscopic LISA--EN-Y GASTRIC BYPASS    Subjective:   Pt is feeling well, pain is well controlled, She has really stuggled with itching since the surgery.  She has been treated with Nubain, but she has breakthrough itching.  Pt is urinating without a catheter    Vitals:    06/03/24 1459 06/03/24 1511 06/03/24 2015 06/03/24 2338   BP: 123/87 135/89 102/72 120/86   BP Location: Left arm Left arm Left arm Left arm   Pulse: 96 90 97 85   Resp: 16 16 16 16   Temp: 98  F (36.7  C) 98.1  F (36.7  C) 97.9  F (36.6  C) 97.6  F (36.4  C)   TempSrc: Oral Oral Oral Oral   SpO2: 94% 94% 93% 97%   Weight:       Height:           Physical Exam:  Lungs:  CTA  CV:       RRR  Ab:       Soft, + BS, dressings are clean dry and intact,  The drain is putting out sero-sanguinous fluid.              Component  Ref Range & Units 12:17 AM 4 mo ago    WBC Count  4.0 - 11.0 10e3/uL 17.8 High  9.1    RBC Count  3.80 - 5.20 10e6/uL 3.99 4.33    Hemoglobin  11.7 - 15.7 g/dL 12.0 12.8    Hematocrit  35.0 - 47.0 % 36.4 39.9    MCV  78 - 100 fL 91 92    MCH  26.5 - 33.0 pg 30.1 29.6    MCHC  31.5 - 36.5 g/dL 33.0 32.1    RDW  10.0 - 15.0 % 13.2 14.5    Platelet Count  150 - 450 10e3/uL 226 265          Component  Ref Range & Units 12:17 AM  (6/4/24) 4 mo ago  (1/24/24) 8 yr ago  (5/23/16) 8 yr ago  (7/10/15) 10 yr ago  (5/28/14)    Sodium  135 - 145 mmol/L 132 Low  137 CM      Comment: Reference intervals for this test were updated on 09/26/2023 to more accurately reflect our healthy population. There may be differences in the flagging of prior results with similar values performed with this method. Interpretation of those prior results can be made in the context of the updated reference intervals.    Potassium  3.4 - 5.3 mmol/L 5.2 4.0       Chloride  98 - 107 mmol/L 100 104       Carbon Dioxide (CO2)  22 - 29 mmol/L 22 23       Anion Gap  7 - 15 mmol/L 10 10       Urea Nitrogen  6.0  - 20.0 mg/dL 15.6 18.8       Creatinine  0.51 - 0.95 mg/dL 0.87 0.71 0.70 VC, R, CM 0.72 R, CM 0.69 R, CM    GFR Estimate  >60 mL/min/1.73m2 80 >90       Calcium  8.6 - 10.0 mg/dL 8.3 Low  9.0       Glucose  70 - 99 mg/dL 172 High  98          Assessment:  Pt is progressing well.  Their pain is well controlled.  They have advanced through their clear liquids and are working on their full liquid diet without nausea or vomiting.    Plan: She is doing well but with the leukocytosis and her need for extensive Lysis of bowel adhesions I would like to keep her another day to monitor.      Arnold Rizo MD  [facility]  [phone]

## 2024-06-04 NOTE — PROVIDER NOTIFICATION
Paged Surgery on-call ( Dr Chaparro)per RN request due to pt has itching that has not resolved with medication.

## 2024-06-05 ENCOUNTER — TELEPHONE (OUTPATIENT)
Dept: PHARMACY | Facility: HOSPITAL | Age: 54
End: 2024-06-05
Payer: COMMERCIAL

## 2024-06-05 VITALS
OXYGEN SATURATION: 96 % | HEART RATE: 82 BPM | SYSTOLIC BLOOD PRESSURE: 121 MMHG | RESPIRATION RATE: 18 BRPM | HEIGHT: 64 IN | WEIGHT: 236.8 LBS | TEMPERATURE: 98 F | BODY MASS INDEX: 40.43 KG/M2 | DIASTOLIC BLOOD PRESSURE: 86 MMHG

## 2024-06-05 LAB
ERYTHROCYTE [DISTWIDTH] IN BLOOD BY AUTOMATED COUNT: 13.9 % (ref 10–15)
HCT VFR BLD AUTO: 33.3 % (ref 35–47)
HGB BLD-MCNC: 10.9 G/DL (ref 11.7–15.7)
MCH RBC QN AUTO: 30.4 PG (ref 26.5–33)
MCHC RBC AUTO-ENTMCNC: 32.7 G/DL (ref 31.5–36.5)
MCV RBC AUTO: 93 FL (ref 78–100)
PLATELET # BLD AUTO: 198 10E3/UL (ref 150–450)
RBC # BLD AUTO: 3.58 10E6/UL (ref 3.8–5.2)
WBC # BLD AUTO: 12.7 10E3/UL (ref 4–11)

## 2024-06-05 PROCEDURE — 85027 COMPLETE CBC AUTOMATED: CPT | Performed by: NURSE PRACTITIONER

## 2024-06-05 PROCEDURE — 250N000013 HC RX MED GY IP 250 OP 250 PS 637: Performed by: NURSE PRACTITIONER

## 2024-06-05 PROCEDURE — 36415 COLL VENOUS BLD VENIPUNCTURE: CPT | Performed by: NURSE PRACTITIONER

## 2024-06-05 RX ORDER — BUPROPION HYDROCHLORIDE 75 MG/1
75 TABLET ORAL 2 TIMES DAILY
COMMUNITY
Start: 2024-06-05

## 2024-06-05 RX ORDER — VENLAFAXINE HYDROCHLORIDE 150 MG/1
CAPSULE, EXTENDED RELEASE ORAL DAILY
COMMUNITY
Start: 2024-07-15

## 2024-06-05 RX ORDER — OXYCODONE HYDROCHLORIDE 5 MG/1
5 TABLET ORAL ONCE
Status: COMPLETED | OUTPATIENT
Start: 2024-06-05 | End: 2024-06-05

## 2024-06-05 RX ORDER — BUPROPION HYDROCHLORIDE 150 MG/1
150 TABLET ORAL EVERY MORNING
COMMUNITY
Start: 2024-07-15

## 2024-06-05 RX ORDER — VENLAFAXINE 50 MG/1
50 TABLET ORAL
COMMUNITY
Start: 2024-06-05

## 2024-06-05 RX ORDER — ACETAMINOPHEN 500 MG
1000 TABLET ORAL EVERY 6 HOURS PRN
COMMUNITY
Start: 2024-06-05

## 2024-06-05 RX ORDER — BUPROPION HYDROCHLORIDE 75 MG/1
75 TABLET ORAL 2 TIMES DAILY
Qty: 84 TABLET | Refills: 0 | Status: SHIPPED | OUTPATIENT
Start: 2024-06-05 | End: 2024-06-05

## 2024-06-05 RX ORDER — VENLAFAXINE 50 MG/1
50 TABLET ORAL
Qty: 126 TABLET | Refills: 0 | Status: SHIPPED | OUTPATIENT
Start: 2024-06-05 | End: 2024-06-05

## 2024-06-05 RX ORDER — OXYCODONE HYDROCHLORIDE 5 MG/1
5 TABLET ORAL EVERY 6 HOURS PRN
Qty: 12 TABLET | Refills: 0 | Status: SHIPPED | OUTPATIENT
Start: 2024-06-05 | End: 2024-06-07

## 2024-06-05 RX ADMIN — OXYCODONE HYDROCHLORIDE 5 MG: 5 TABLET ORAL at 11:16

## 2024-06-05 RX ADMIN — Medication 1000 MCG: at 09:52

## 2024-06-05 RX ADMIN — ACETAMINOPHEN 975 MG: 325 SOLUTION ORAL at 06:06

## 2024-06-05 RX ADMIN — HYDROXYZINE HYDROCHLORIDE 25 MG: 10 SOLUTION ORAL at 09:59

## 2024-06-05 RX ADMIN — OMEPRAZOLE 20 MG: 20 CAPSULE, DELAYED RELEASE ORAL at 09:52

## 2024-06-05 RX ADMIN — VENLAFAXINE 50 MG: 25 TABLET ORAL at 09:52

## 2024-06-05 RX ADMIN — Medication 1 TABLET: at 09:52

## 2024-06-05 RX ADMIN — ACETAMINOPHEN 975 MG: 325 SOLUTION ORAL at 10:00

## 2024-06-05 RX ADMIN — BUPROPION HYDROCHLORIDE 75 MG: 75 TABLET, FILM COATED ORAL at 09:52

## 2024-06-05 ASSESSMENT — ACTIVITIES OF DAILY LIVING (ADL)
ADLS_ACUITY_SCORE: 23

## 2024-06-05 NOTE — PLAN OF CARE
Goal Outcome Evaluation:      Care Plan Progress Note:    Name: Pinky Post  :   1970  MRN:   [id number]    Problem: Bariatric Procedure  Goal: Prevent post-op bariatric complications.  Appropriate oral intake.  Discharge needs are met.  Intervention:   Post Op Day 0/1 (progress as patient tolerates)   -Notify MD of excessive nausea   -NPO per MD orders; read exceptions to the order   -Toothette with 1/2 inch warm water at bedside until able to take PO   -Do not give ice chips, straws, or carbonation    -Whenever drinking liquids, patient must be upright with both feet on the floor   -No more than 30mL per sip   -All liquids must be at approximately room temperature (no extreme temperatures).   -After 2 hours of 30mL PO q30min, you may take 30mL as tolerated and advance to a clear liquid diet    -No oral pills until after the patient tolerates the 2 hours of 30mL sips (exceptions: sublingual medications can be given anytime; liquid gabapentin can be given after 1 hours of sips)   -Strict intake and output   -Bladder scan every 4 hours until voiding freely   -If tolerating sips, start bariatric clear liquid diet   -If tolerating bariatric clears, advance to a bariatric full liquid diet  Discharge Planning   -Educate patient about pill size   -Patient should take no pill greater than 1/4 inch   -Send pill cutter home with patient   -Nurse to review home discharge instructions  Outcome:    Shift  Intake: 150ml  Output: 100  Bladder Scan: na  Pain: given scheduled Tylenol  Incision: CDI, small old drainage, JONES leaking at site, dressing changed  Nausea: denies  Activity: resting this shift  Incentive Spirometry: 1500  Abdominal Binder: in room    Kareem Mancuso RN  2024  6:26 AM

## 2024-06-05 NOTE — PROGRESS NOTES
Time in: 1:00 pm  /Time out: 1:30 pm    Pt presents for 1 week post op dietitian follow up. Educated pt on pureed and soft to bariatric regular diets. Provided grocery list and sample meal plan for each diet stage.  Pt will begin pureed diet on 6/11/2024 and advance as tolerated to softs/bariatric regular on 6/25/2024. Instructed pt to begin 500 mg calcium citrate BID and 5000IU Vitamin D3 3 weeks post op. Pt to follow up with RD at 3 months post op.     Juliana Sherwood RD

## 2024-06-05 NOTE — DISCHARGE SUMMARY
Bariatric Surgery Discharge Summary    Primary Care Physician:  Pratibha Pastrana    Discharge Provider: CARLI Alcocer CNP  Admission Date: 6/3/2024  Discharge Date: June 5, 2024     Primary Diagnosis at Discharge:   Patient Active Problem List   Diagnosis    Fibromyalgia    Multiple joint pain    Fatigue    Dyslipidemia    Morbid obesity (H)    Morbid (severe) obesity due to excess calories (H)      Disposition: Home   Condition at Discharge: Stable     Surgery: Laparoscopic Farheen-en-y Gastric Bypass     Hospital Summary:   Pinky Post was admitted for morbid obesity.  she underwent an uncomplicated laparoscopic Farheen-en-y gastric bypass.  Following a brief recovery in the PACU, she was transferred to the Med/Surg floor for the remainder of her stay.  her post operative course was uneventful.  On POD # 2 she was discharged home tolerating a full liquid diet, ambulating without assistance, and pain controlled with oral analgesics. Drain in place to be removed in clinic on Friday    Discharge Medications:      Medication List        Started      oxyCODONE 5 MG tablet  Commonly known as: ROXICODONE  5 mg, Oral, EVERY 6 HOURS PRN            Modified      * acetaminophen 500 MG tablet  Commonly known as: TYLENOL  What changed: additional instructions     * Tylenol Dissolve Packs 500 MG Pack  Generic drug: Acetaminophen  1,000 mg, Oral, EVERY 6 HOURS  What changed: You were already taking a medication with the same name, and this prescription was added. Make sure you understand how and when to take each.     * buPROPion 75 MG tablet  Commonly known as: WELLBUTRIN  What changed: You were already taking a medication with the same name, and this prescription was added. Make sure you understand how and when to take each.     * buPROPion 150 MG 24 hr tablet  Commonly known as: WELLBUTRIN XL  Start taking on: July 15, 2024  What changed:   additional instructions  These instructions start on July 15, 2024. If you  are unsure what to do until then, ask your doctor or other care provider.     * venlafaxine 50 MG tablet  Commonly known as: EFFEXOR  What changed: You were already taking a medication with the same name, and this prescription was added. Make sure you understand how and when to take each.     * venlafaxine 150 MG 24 hr capsule  Commonly known as: EFFEXOR XR  Start taking on: July 15, 2024  What changed:   when to take this  additional instructions  These instructions start on July 15, 2024. If you are unsure what to do until then, ask your doctor or other care provider.           * This list has 6 medication(s) that are the same as other medications prescribed for you. Read the directions carefully, and ask your doctor or other care provider to review them with you.                Discontinued      pantoprazole 40 MG EC tablet  Commonly known as: PROTONIX              Discharge Instructions:  Follow up appointment with Primary Care Physician: Pratibha Pastrana  Follow up appointment with Dr. Rizo in 2 weeks  Attend the post-op dietary class as scheduled    Post operative instructions:   Diet:     For one week following your surgery or until you meet with the dietician, you will be on a full liquid diet.  It is extremely important to follow the liquid diet to allow for optimal healing and to prevent food from becoming lodged at the gastric outlet.    Protein is an important part of the diet after surgery; therefore, it is necessary to drink fluids that are high in protein.  Proteins are building blocks that help you heal after surgery and help preserve your muscle mass while you are losing weight.      Including carbohydrates in the diet is also important after surgery to prevent your body from burning fat for energy (Ketosis).  These carbohydrates include: unsweetened applesauce, blended canned fruit (in its own juice), Stage I baby food fruit, thin cream of wheat, light yogurt (no fruit chunks), or 100% fruit  juice diluted (50% juice/50% water).     Remember to take 1 Multivitamin with Iron 2 times daily and 1000 mcg of Sublingual B-12 daily.       Aim for 40-50 grams of protein daily during this week.    Sip 48-64 ounces of liquid per day in addition to full liquid meals/supplements.    After 2 weeks of the full liquid diet, you will advance to the pureed diet, as instructed.    Activity: You should continue to be active at home including ambulating frequently.  If possible try to limit the amount of time spent in bed.    Restrictions: you should avoid lifting anything more than 20 pounds or strenuous physical activity for a total of 2 weeks.        CARLI Fitch CNP  [phone]  [facility]

## 2024-06-05 NOTE — DISCHARGE INSTRUCTIONS
If you are worried about whether or not you need to be seen or if something is wrong, please call your bariatric nurse line at [phone] or the bariatric clinic at [phone]. If you need to be seen in the emergency department for any reason in the next 30 days, please return to [facility]. The bariatric team should be involved in your care/diet even if the problem is unrelated to your surgery.

## 2024-06-05 NOTE — PROGRESS NOTES
Care Plan Progress Note:    Name: Pinky Post  :   1970  MRN:   [id number]    Problem: Bariatric Procedure  Goal: Prevent post-op bariatric complications.  Appropriate oral intake.  Discharge needs are met.  Intervention:   Post Op Day 0/1 (progress as patient tolerates)   -Notify MD of excessive nausea   -NPO per MD orders; read exceptions to the order   -Toothette with 1/2 inch warm water at bedside until able to take PO   -Do not give ice chips, straws, or carbonation    -Whenever drinking liquids, patient must be upright with both feet on the floor   -No more than 30mL per sip   -All liquids must be at approximately room temperature (no extreme temperatures).   -After 2 hours of 30mL PO q30min, you may take 30mL as tolerated and advance to a clear liquid diet    -No oral pills until after the patient tolerates the 2 hours of 30mL sips (exceptions: sublingual medications can be given anytime; liquid gabapentin can be given after 1 hours of sips)   -Strict intake and output   -Bladder scan every 4 hours until voiding freely   -If tolerating sips, start bariatric clear liquid diet   -If tolerating bariatric clears, advance to a bariatric full liquid diet  Discharge Planning   -Educate patient about pill size   -Patient should take no pill greater than 1/4 inch   -Send pill cutter home with patient   -Nurse to review home discharge instructions  Outcome:    Shift  Intake: 450  Output: 400  Bladder Scan: voiding freely  Pain: managed with Tylenol  Incision: lap sites CDI, JONES site changed x1  Nausea: none  Activity: ambulated in damian and room  Incentive Spirometry: 1500  Abdominal Binder: on pt    Brett Asif RN  2024  9:21 PM

## 2024-06-05 NOTE — PROGRESS NOTES
Bariatric Surgery Progress Note    2 Days Post-Op    Procedure(s):  CREATION, GASTRIC BYPASS, LISA-EN-Y, LAPAROSCOPIC,  EXTENSIVE LYSIS OF ADHESIONS    Subjective:   Patient reports pain is controlled but a bit worse today. Patient is tolerating bariatric full liquid diet, ambulating and voiding. Patient denies fever, chills, dizziness, blurred vision, headache, nausea, vomiting, SOB, CP, and leg pain.    Patient Vitals for the past 24 hrs:   BP Temp Temp src Pulse Resp SpO2   06/05/24 0709 121/86 98  F (36.7  C) Oral 82 18 96 %   06/04/24 2345 (!) 121/90 97.7  F (36.5  C) Oral 80 18 99 %   06/04/24 1959 128/84 98  F (36.7  C) -- 81 18 97 %   06/04/24 1508 120/81 97.9  F (36.6  C) Oral 88 17 98 %       Physical Exam:  General: A/O, NAD  Ab:       Soft, + BS, expected ttp POD 2; The wound/ dressings are clean, dry, and intact; drain serosanguineous  :      Patient is voiding adequate amount    Admission on 06/03/2024   Component Date Value    GLUCOSE BY METER POCT 06/03/2024 125 (H)     Sodium 06/04/2024 132 (L)     Potassium 06/04/2024 5.2     Chloride 06/04/2024 100     Carbon Dioxide (CO2) 06/04/2024 22     Anion Gap 06/04/2024 10     Urea Nitrogen 06/04/2024 15.6     Creatinine 06/04/2024 0.87     GFR Estimate 06/04/2024 80     Calcium 06/04/2024 8.3 (L)     Glucose 06/04/2024 172 (H)     WBC Count 06/04/2024 17.8 (H)     RBC Count 06/04/2024 3.99     Hemoglobin 06/04/2024 12.0     Hematocrit 06/04/2024 36.4     MCV 06/04/2024 91     MCH 06/04/2024 30.1     MCHC 06/04/2024 33.0     RDW 06/04/2024 13.2     Platelet Count 06/04/2024 226     WBC Count 06/05/2024 12.7 (H)     RBC Count 06/05/2024 3.58 (L)     Hemoglobin 06/05/2024 10.9 (L)     Hematocrit 06/05/2024 33.3 (L)     MCV 06/05/2024 93     MCH 06/05/2024 30.4     MCHC 06/05/2024 32.7     RDW 06/05/2024 13.9     Platelet Count 06/05/2024 198        Assessment/Plan:   Patient is progressing well after surgery. She should be able to discharge home today and  follow up Friday with Dr Rizo for drain removal. Discharge orders and instructions are placed    Discussed discharge instructions with the patient along with signs and symptoms to watch for post-op.  Patient verbalized understanding of the plan, including:    - Use of incentive spirometer and walking as tolerated   - Use of abdominal binder if needed   - Importance of getting 48-64 ounces of water in daily for hydration    - Use of medication cups for measuring out sips for the next 3-4 days.   - Bariatric full liquid diet for the next week.   - Drink a protein shake providing 20-30 grams of protein in addition to meals daily; aim for a total of 40 grams of protein daily   - Attend the post-op dietary class next week   - Take omeprazole daily for the next 3 months and avoid/eliminate NSAID usage   - Take chewable MVI with 18mg iron twice a day and SL B12 1,000-2,500 mcg daily.    - Call Bariatric clinic with any questions or concerns   - If patient needs to be seen in the emergency department for any reason, she needs to return to [facility].    Pt is scheduled to follow up at Bariatric Clinic next week.  Patient verbalized understanding of the plan. Bariatric nurse clinician will call her in a couple days when she gets home to check in with her.    Greater than 45 minutes were spent with this patient with more than 50% of that time spent on education.    Lluvia Gutierrez, CNP  [phone]  [facility]

## 2024-06-07 ENCOUNTER — TELEPHONE (OUTPATIENT)
Dept: SURGERY | Facility: CLINIC | Age: 54
End: 2024-06-07

## 2024-06-07 ENCOUNTER — OFFICE VISIT (OUTPATIENT)
Dept: SURGERY | Facility: CLINIC | Age: 54
End: 2024-06-07
Payer: COMMERCIAL

## 2024-06-07 VITALS
DIASTOLIC BLOOD PRESSURE: 84 MMHG | HEIGHT: 64 IN | SYSTOLIC BLOOD PRESSURE: 120 MMHG | BODY MASS INDEX: 40.27 KG/M2 | WEIGHT: 235.9 LBS

## 2024-06-07 DIAGNOSIS — Z98.84 S/P GASTRIC BYPASS: ICD-10-CM

## 2024-06-07 DIAGNOSIS — Z98.84 STATUS POST GASTRIC BYPASS FOR OBESITY: Primary | ICD-10-CM

## 2024-06-07 DIAGNOSIS — E66.01 MORBID (SEVERE) OBESITY DUE TO EXCESS CALORIES (H): ICD-10-CM

## 2024-06-07 PROCEDURE — 99024 POSTOP FOLLOW-UP VISIT: CPT | Performed by: SURGERY

## 2024-06-07 RX ORDER — OXYCODONE HYDROCHLORIDE 5 MG/1
5 TABLET ORAL EVERY 6 HOURS PRN
Qty: 12 TABLET | Refills: 0 | Status: SHIPPED | OUTPATIENT
Start: 2024-06-07

## 2024-06-07 NOTE — LETTER
"6/7/2024      Pinky Post  [address]  [place] [address]      Dear Colleague,    Thank you for referring your patient, Pinky Post, to the [facility] [place]. Please see a copy of my visit note below.    HPI: Pt is here for follow up of a Laparoscopic Farheen-en-Y Gastric Bypass. she is doing well. Taking po well. No vomiting. No fevers or chills. Ambulating without problems.       /84   Ht 1.613 m (5' 3.5\")   Wt 107 kg (235 lb 14.4 oz)   BMI 41.13 kg/m      Body mass index is 41.13 kg/m .    EXAM:  GENERAL:Appears well  ABDOMEN: Incisions healing well, peritoneal drain that is putting out 100 cc of serous looking fluid each day.  There is no evidence of succus seen in this drainage fluid.        Assessment/Plan: Pt s/p Laparoscopic Farheen-en-Y Gastric Bypass.  She is doing well and does not appear to need this drain any longer.  I removed this drain in clinic today.  She will f/u with us at the Bariatric Center per our post bariatric surgery protocol.    Arnold Rizo MD ,MD  [facility] Department of Surgery       Again, thank you for allowing me to participate in the care of your patient.        Sincerely,        Arnold Rizo MD  "

## 2024-06-07 NOTE — TELEPHONE ENCOUNTER
Post-Op Phone Call  [facility]    Surgeon: Arnold Rizo M.D.  Date of Surgery: 6/3/2024  Discharge Date: 6/5/2024    Date/Time Called:   Date: 6/7/2024 Time: 10:54 AM   Attempt: First    Pain Control:  Intensity: Moderate (4 - 5)  Duration/Location/Explain: certain movements and still not super easy to sleep without the Oxy.  What makes it better/worse? The drain was uncomfortable, but was removed this morning by Dr. Rizo    Medications:  Narcotic Use - Yes - Oxycodone - can't rest without it  Drug type: Gabapentin  Frequency: Oxy every 6 hours and is trying to skip the afternoon doses    Non-prescription pain control: Tylenol    Other medications currently taking: See medication list for details - medication list reviewed    Complete Multivitamin + Iron BID? Yes    Vitamin B12? sublingual daily    Incisions:  Drainage? clean and dry  Comment: drainage site dressing intact.    Intake/Output:  Fluid Intake(oz/day)? 22 oz yesterday and 20 oz so far today already  Fluid type? Gatorade, and power-aid zero    Heartburn? No  Counseling: Call if reflux, or pain with eating and drinking start  Nausea? No  Vomiting? No  BM? Yes  Gas? Yes    Voiding Frequency? 4 or more/day     Are you using your incentive spirometer? If yes, how often? 3+/day    Any fever type symptoms? No    Walking activity? Yes  Frequency/Type: walking    In Preparation for Surgery:    Is there anything you would have changed about your preparation for surgery from our clinic? If yes, what? good    On a scale of 1-5, with 5 being the highest, how was the service while you were in the hospital?  It was good !    Is/was there anyone in particular; nurse, aide, hospital staff, that did a great job and you would like us to recognize? If yes, whom. Lucas, very knowledgeable and helpful    Reminder of plan and expectations for follow-up visits given to the patient.    Thank you for your time. Please do not hesitate to call us with any  questions or concerns.    Call completed by: Sheila Hernández RN

## 2024-06-07 NOTE — PROGRESS NOTES
"HPI: Pt is here for follow up of a Laparoscopic Farheen-en-Y Gastric Bypass. she is doing well. Taking po well. No vomiting. No fevers or chills. Ambulating without problems.       /84   Ht 1.613 m (5' 3.5\")   Wt 107 kg (235 lb 14.4 oz)   BMI 41.13 kg/m      Body mass index is 41.13 kg/m .    EXAM:  GENERAL:Appears well  ABDOMEN: Incisions healing well, peritoneal drain that is putting out 100 cc of serous looking fluid each day.  There is no evidence of succus seen in this drainage fluid.        Assessment/Plan: Pt s/p Laparoscopic Farheen-en-Y Gastric Bypass.  She is doing well and does not appear to need this drain any longer.  I removed this drain in clinic today.  She will f/u with us at the Bariatric Center per our post bariatric surgery protocol.    Arnold Rizo MD ,MD  [facility] Department of Surgery   "

## 2024-06-11 ENCOUNTER — VIRTUAL VISIT (OUTPATIENT)
Dept: SURGERY | Facility: CLINIC | Age: 54
End: 2024-06-11
Payer: COMMERCIAL

## 2024-06-11 DIAGNOSIS — Z98.84 BARIATRIC SURGERY STATUS: Primary | ICD-10-CM

## 2024-06-11 PROCEDURE — 99207 PR PREOP VISIT IN GLOBAL PKG: CPT | Mod: 25

## 2024-06-14 ENCOUNTER — VIRTUAL VISIT (OUTPATIENT)
Dept: SURGERY | Facility: CLINIC | Age: 54
End: 2024-06-14
Payer: COMMERCIAL

## 2024-06-14 VITALS — BODY MASS INDEX: 39.95 KG/M2 | WEIGHT: 234 LBS | HEIGHT: 64 IN

## 2024-06-14 DIAGNOSIS — Z98.84 S/P GASTRIC BYPASS: Primary | ICD-10-CM

## 2024-06-14 PROCEDURE — 99024 POSTOP FOLLOW-UP VISIT: CPT | Mod: 95 | Performed by: SURGERY

## 2024-06-14 NOTE — PROGRESS NOTES
Pinky Post is 54 year old  female who presents for a billable video visit today.    How would you like to obtain your AVS? MyChart  If dropped from the video visit, the video invitation should be resent by: Send to e-mail at: [email or web address]  Will anyone else be joining your video visit? No      Video Start Time: 8:15 AM    Are there any specific questions or needs that you would like addressed at your visit today? 2 week post-op LSG    Provider Notes:   HPI: Pt is here for follow up of a Lap RNY GBP. she is doing well. Taking po well. No vomiting. No fevers or chills. Ambulating without problems.       Weight:  224 lbs today;   She weighed 234 on the day of surgery.    EXAM :  GENERAL:Appears well  ABDOMEN: Incisions healing well      Assessment/Plan: Pt s/p Lap RNY-GBP. She is doing well. Diet and activity discussed.  She is drinking 60 oz a day.   she will f/u with us at the Bariatric Center in 3 months.    Arnold Rizo MD ,MD  [facility] Department of Surgery       Video-Visit Details    Type of service:  Video Visit    Platform used for Video Visit: Tripcover    Video End Time (time video stopped): 8:46 AM    Originating Location (pt. Location): Home      Distant Location (provider location):  On-site    Distant Location (provider location):  [facility] [place]

## 2024-06-14 NOTE — LETTER
6/14/2024      Pinky Post  [address] [place] [address]      Dear Colleague,    Thank you for referring your patient, Pinky Post, to the [facility]. Please see a copy of my visit note below.    Pinky Post is 54 year old  female who presents for a billable video visit today.    How would you like to obtain your AVS? MyChart  If dropped from the video visit, the video invitation should be resent by: Send to e-mail at: [email or web address]  Will anyone else be joining your video visit? No      Video Start Time: 8:15 AM    Are there any specific questions or needs that you would like addressed at your visit today? 2 week post-op LSG    Provider Notes:   HPI: Pt is here for follow up of a Lap RNY GBP. she is doing well. Taking po well. No vomiting. No fevers or chills. Ambulating without problems.       Weight:  224 lbs today;   She weighed 234 on the day of surgery.    EXAM :  GENERAL:Appears well  ABDOMEN: Incisions healing well      Assessment/Plan: Pt s/p Lap RNY-GBP. She is doing well. Diet and activity discussed.  She is drinking 60 oz a day.   she will f/u with us at the [facility] in 3 months.    Arnold Rizo MD ,MD  [facility] Department of Surgery       Video-Visit Details    Type of service:  Video Visit    Platform used for Video Visit: [employer]    Video End Time (time video stopped): 8:46 AM    Originating Location (pt. Location): Home      Distant Location (provider location):  On-site    Distant Location (provider location):  [facility]       Again, thank you for allowing me to participate in the care of your patient.        Sincerely,        Arnold Rizo MD

## 2024-07-08 ENCOUNTER — VIRTUAL VISIT (OUTPATIENT)
Dept: SURGERY | Facility: CLINIC | Age: 54
End: 2024-07-08
Payer: COMMERCIAL

## 2024-07-08 DIAGNOSIS — Z98.84 S/P GASTRIC BYPASS: Primary | ICD-10-CM

## 2024-07-08 NOTE — LETTER
7/8/2024      Pinky Post  [address]  [place] [address]      Dear Colleague,    Thank you for referring your patient, Pinky Post, to the [facility]. Please see a copy of my visit note below.    Patient presents for 1 month post op dietitian follow up visit. Reports tolerating soft food diet well. Denies reflux, nausea/vomiting, constipation/diarrhea, or significant pain. Taking MVI and Vitamin B12 appropriately. Instructed patient to begin taking 500 mg calcium citrate BID and 5000 IU Vitamin D3. Educated patient on soft to bariatric regular diets, medications, developing an exercise regimen, and other dietary points of care. Provided education handout on items discussed. Patient to follow up with RD at 3 months post op.     Have you been to the hospital or seen by a doctor for any reason since your surgery? No      Elizabeth Arredondo RD, LD      Again, thank you for allowing me to participate in the care of your patient.        Sincerely,        Elizabeth Arredondo RD

## 2024-07-08 NOTE — PATIENT INSTRUCTIONS
"[facility] Bariatric Care  Nutritional Guidelines  1 Month Post Op    Congratulations on making it to the one month post-operative check in! You have worked very hard in the past month to maintain new lifestyle skills and diet habits. We know the road hasn't been easy, but there is light at the end of the tunnel.    Soft/Regular Diet   Remember, your diet plan is a nutrition prescription and must be followed. Food and supplements provide essential nutrients your body needs to heal and function. The goal at one month is to eat nutrient-dense whole foods. Remember to eat three meals per day to help you meet your protein and other nutrient needs.     Soft/solid foods:    Protein Choices:  Egg/egg substitute  Moist ground beef/turkey  Ground round/sirloin  Lean, tender chicken, turkey, or pork loin  Baked, unbreaded fish  Low fat/fat free cottage cheese  Fat free yogurt/Greek yogurt  Low fat string cheese  Vegan crumbles  Tofu  Hummus      Vegetables (cooked)  Carrots  Peeled zucchini  Green beans  Asparagus  Avoid- stringy/fibrous vegetables      Fruit  Peeled apples   Cantaloupe  Watermelon  Canned fruit packed in 100% juice or water, drained  Avoid- fruits with skins, seeds or membranes that could potentially \"get stuck\"    Starches  It is encouraged to consume adequate amounts of protein, non-starchy vegetables and fruit prior to eating starches.  The fiber in whole grains may not be tolerated 30 days post op.   Slowly add small portions of whole grain food into diet  Unsweetened cream of wheat  2-3 whole wheat crackers  Baked sweet or mashed potato (no skin)    Medicine/Vitamins  Vitamins  At this point, start taking 500-600mg calcium citrate twice daily (chewable/crushed)  Take 5000 IU vitamin D3 daily  Continue taking 1 multivitamin twice daily  (chewable/crushed)  Continue taking B12 daily (under the tongue)    Medicine  Actigall- if prescribed, continue taking for 5 more months  Omeprazole- ok to stop " "splitting now. Continue taking for 2 more months. If at the end of the 3rd month, you don't have any pain or reflux symptoms then you can stop taking this medication. Contact the clinic if you continue to have the above symptoms, otherwise the refill will be denied.  Constipation -It is common to take a stool softener (miralax) if experiencing constipation  If your medications are larger than an eraser (or 1/4 inch), cut in half for the first 6 weeks following surgery.    Sleep  After one month post op surgery, you should notice improved sleep quality  Sleep apnea patients:    -Your CPAP mask may need adjustment and you need to schedule and appointment    -It is imperative for you to be your own advocate if you notice changes in your sleeping habits and report any concerns to your doctor or sleep center.    Exercise   It is important to exercise most days of the week for 30-60 min. Set a goal for yourself to work toward.    Frequently Asked Questions  What if I am not hungry? Should I still eat?  If you do not feel hungry, you should still take a few bites of food during a meal time (eat 3x per day, 4-6 hours between meals) to stay on track with your eating schedule and help to meet your daily nutrient needs and protein goal. Avoid skipping meals. It is important, however, to stop eating when feeling full, even in food is left on the plate. Fullness can feel different and exhibit in various ways (e.g. hiccups, runny nose, headache, tightness in shoulders, etc.)    2. How many calories should I be eating now?   The short answer is: there is not an amount you \"should be eating\". Your new stomach is drastically reducing the amount of food you are able to eat and therefore you are losing weight because you are eating less calories than what your body needs to maintain its current weight. Following all the bariatric eating and nutrition guidelines helps you meet your nutrition needs and goals while feeling your best when " "eating and drinking. You do not NEED to track your calorie intake.   With that being said, it can be helpful to have a ballpark estimate of how many calories people tend to eat after surgery. We estimate people are eating around 500-800 calories by 1 month post op, 700-1000 calories from 1-3 months post op, and 800-1300 calories 3-12 months post op.    3. When can I eat a salad?   Always avoid skins, peels, membranes until 3 months. Some people tolerate leafy greens better after 3 months post op as well. When adding salad back into your diet, it can be helpful to finely chop/dice/mince all your raw veggies into very small pieces to make it easier to chew to an applesauce consistency. Start with the \"softest\" raw veggies, chopped or minced small to make your salad. For example, mix finely chopped cucumber (peel and seeds removed, or seedless kind), jicama, mushrooms, tomatoes (seeds and peel removed), avocado, and/or zuchinni together with a little salad dressing to start.    4. What are my fluid goals?   You should be aiming for 64oz fluids per day to prevent dehydration. Symptoms of dehydration include extreme thirst, less frequent urination, dark colored urine, fatigue, dizziness, and confusion.    5. Can I swallow pills yet?   You cannot swallow your vitamin supplements until the 3 month post op point. All other medications are acceptable to swallow after 6 weeks.     6. How much weight can I expect to lose?   In a typical patient, bariatric surgery can cause, on average, a 50-70% reduction in excess body weight over three years. For example, if you are 100 lbs overweight, you could expect to lose 50-70 lbs.     7. My weight loss has stalled, is that normal?   Weight loss after bariatric surgery may not be perfectly linear after surgery. It is normal to see varying amounts of weight loss from week to week and for weight loss to slow over the first year. It is also possible to see your weight maintain for a week " "(or maybe two) in the early months after surgery. If you ever have concerns about your weight loss or your weight maintains for longer than 2 weeks, reach out to your dietitian. It is very helpful to keep a log or record of your weight loss after surgery to be able monitor your weight loss trend. Weight loss can sometimes be very gradual that it can be hard to decipher the trend with out numbers to look at over several months time. Also, make note of what your weight was before starting the pre-surgery liquid diet 2 weeks before surgery. Include that in your total weight loss as that weight loss occurred right before surgery.    Bariatric nutrition guidelines at 1 month post op  Practice mindful eating  Chew all foods to applesauce consistency, swallow and breathe  Stop eating as soon as you know the next bite will be too much  Make sure food, especially meat, is soft and moist enough to swallow without \"sticking\"  Marinated meats are well tolerated  Add chicken broth to dry or fibrous meats when reheating them  Cooking meat in a slow cooker makes it moist and tender  Add low-fat condiments, such as low-fat mayonnaise, mustard, and low fat grav to dry meats  If food feels \"stuck\"do not try to push it down by swallowing fluids. Instead, get up and walk around.  Do not drink fluids before or during meals and wait 30 min after eating to resume drinking  Each day, eat 3 meals and 1 protein shake  Take your vitamin/mineral supplement on schedule  Avoid bread, rice and pasta until you can comfortably consume adequate protein, vegetables and fruit     "

## 2024-07-08 NOTE — PROGRESS NOTES
Patient presents for 1 month post op dietitian follow up visit. Reports tolerating soft food diet well. Denies reflux, nausea/vomiting, constipation/diarrhea, or significant pain. Taking MVI and Vitamin B12 appropriately. Instructed patient to begin taking 500 mg calcium citrate BID and 5000 IU Vitamin D3. Educated patient on soft to bariatric regular diets, medications, developing an exercise regimen, and other dietary points of care. Provided education handout on items discussed. Patient to follow up with RD at 3 months post op.     Have you been to the hospital or seen by a doctor for any reason since your surgery? No      Elizabeth Arredondo RD, LD

## 2024-07-12 ENCOUNTER — HOSPITAL ENCOUNTER (EMERGENCY)
Facility: OTHER | Age: 54
Discharge: HOME | End: 2024-07-12
Payer: MEDICARE

## 2024-07-12 ENCOUNTER — APPOINTMENT (OUTPATIENT)
Dept: GENERAL RADIOLOGY | Facility: OTHER | Age: 54
End: 2024-07-12
Attending: FAMILY MEDICINE
Payer: MEDICARE

## 2024-07-12 VITALS
TEMPERATURE: 97 F | SYSTOLIC BLOOD PRESSURE: 132 MMHG | HEIGHT: 64 IN | RESPIRATION RATE: 18 BRPM | HEART RATE: 103 BPM | BODY MASS INDEX: 40.17 KG/M2 | DIASTOLIC BLOOD PRESSURE: 85 MMHG | OXYGEN SATURATION: 98 %

## 2024-07-12 DIAGNOSIS — S42.294A OTHER CLOSED NONDISPLACED FRACTURE OF PROXIMAL END OF RIGHT HUMERUS, INITIAL ENCOUNTER: ICD-10-CM

## 2024-07-12 LAB
ANION GAP SERPL CALCULATED.3IONS-SCNC: 14 MMOL/L (ref 7–15)
BASOPHILS # BLD AUTO: 0 10E3/UL (ref 0–0.2)
BASOPHILS NFR BLD AUTO: 0 %
BUN SERPL-MCNC: 12 MG/DL (ref 6–20)
CALCIUM SERPL-MCNC: 9.8 MG/DL (ref 8.6–10)
CHLORIDE SERPL-SCNC: 102 MMOL/L (ref 98–107)
CREAT SERPL-MCNC: 0.74 MG/DL (ref 0.51–0.95)
DEPRECATED HCO3 PLAS-SCNC: 24 MMOL/L (ref 22–29)
EGFRCR SERPLBLD CKD-EPI 2021: >90 ML/MIN/1.73M2
EOSINOPHIL # BLD AUTO: 0.1 10E3/UL (ref 0–0.7)
EOSINOPHIL NFR BLD AUTO: 1 %
ERYTHROCYTE [DISTWIDTH] IN BLOOD BY AUTOMATED COUNT: 14.6 % (ref 10–15)
GLUCOSE SERPL-MCNC: 99 MG/DL (ref 70–99)
HCT VFR BLD AUTO: 42.5 % (ref 35–47)
HGB BLD-MCNC: 13.6 G/DL (ref 11.7–15.7)
IMM GRANULOCYTES # BLD: 0 10E3/UL
IMM GRANULOCYTES NFR BLD: 0 %
LYMPHOCYTES # BLD AUTO: 2.1 10E3/UL (ref 0.8–5.3)
LYMPHOCYTES NFR BLD AUTO: 27 %
MCH RBC QN AUTO: 29.2 PG (ref 26.5–33)
MCHC RBC AUTO-ENTMCNC: 32 G/DL (ref 31.5–36.5)
MCV RBC AUTO: 91 FL (ref 78–100)
MONOCYTES # BLD AUTO: 0.8 10E3/UL (ref 0–1.3)
MONOCYTES NFR BLD AUTO: 10 %
NEUTROPHILS # BLD AUTO: 4.7 10E3/UL (ref 1.6–8.3)
NEUTROPHILS NFR BLD AUTO: 61 %
NRBC # BLD AUTO: 0 10E3/UL
NRBC BLD AUTO-RTO: 0 /100
PLATELET # BLD AUTO: 226 10E3/UL (ref 150–450)
POTASSIUM SERPL-SCNC: 4 MMOL/L (ref 3.4–5.3)
RBC # BLD AUTO: 4.66 10E6/UL (ref 3.8–5.2)
SODIUM SERPL-SCNC: 140 MMOL/L (ref 135–145)
WBC # BLD AUTO: 7.7 10E3/UL (ref 4–11)

## 2024-07-12 PROCEDURE — 96376 TX/PRO/DX INJ SAME DRUG ADON: CPT | Performed by: FAMILY MEDICINE

## 2024-07-12 PROCEDURE — 99283 EMERGENCY DEPT VISIT LOW MDM: CPT | Performed by: FAMILY MEDICINE

## 2024-07-12 PROCEDURE — 80048 BASIC METABOLIC PNL TOTAL CA: CPT | Performed by: FAMILY MEDICINE

## 2024-07-12 PROCEDURE — 99284 EMERGENCY DEPT VISIT MOD MDM: CPT | Mod: 25 | Performed by: FAMILY MEDICINE

## 2024-07-12 PROCEDURE — 250N000011 HC RX IP 250 OP 636: Performed by: FAMILY MEDICINE

## 2024-07-12 PROCEDURE — 96374 THER/PROPH/DIAG INJ IV PUSH: CPT | Performed by: FAMILY MEDICINE

## 2024-07-12 PROCEDURE — 36415 COLL VENOUS BLD VENIPUNCTURE: CPT | Performed by: FAMILY MEDICINE

## 2024-07-12 PROCEDURE — 73060 X-RAY EXAM OF HUMERUS: CPT | Mod: RT

## 2024-07-12 PROCEDURE — 85025 COMPLETE CBC W/AUTO DIFF WBC: CPT | Performed by: FAMILY MEDICINE

## 2024-07-12 RX ORDER — HYDROMORPHONE HYDROCHLORIDE 1 MG/ML
0.5 INJECTION, SOLUTION INTRAMUSCULAR; INTRAVENOUS; SUBCUTANEOUS EVERY 30 MIN PRN
Status: DISCONTINUED | OUTPATIENT
Start: 2024-07-12 | End: 2024-07-12 | Stop reason: HOSPADM

## 2024-07-12 RX ADMIN — HYDROMORPHONE HYDROCHLORIDE 0.5 MG: 1 INJECTION, SOLUTION INTRAMUSCULAR; INTRAVENOUS; SUBCUTANEOUS at 17:37

## 2024-07-12 RX ADMIN — HYDROMORPHONE HYDROCHLORIDE 0.5 MG: 1 INJECTION, SOLUTION INTRAMUSCULAR; INTRAVENOUS; SUBCUTANEOUS at 19:22

## 2024-07-12 ASSESSMENT — ACTIVITIES OF DAILY LIVING (ADL)
ADLS_ACUITY_SCORE: 38
ADLS_ACUITY_SCORE: 38
ADLS_ACUITY_SCORE: 36

## 2024-07-12 ASSESSMENT — COLUMBIA-SUICIDE SEVERITY RATING SCALE - C-SSRS
2. HAVE YOU ACTUALLY HAD ANY THOUGHTS OF KILLING YOURSELF IN THE PAST MONTH?: NO
1. IN THE PAST MONTH, HAVE YOU WISHED YOU WERE DEAD OR WISHED YOU COULD GO TO SLEEP AND NOT WAKE UP?: NO
6. HAVE YOU EVER DONE ANYTHING, STARTED TO DO ANYTHING, OR PREPARED TO DO ANYTHING TO END YOUR LIFE?: NO
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
## 2024-07-16 ENCOUNTER — OFFICE VISIT (OUTPATIENT)
Dept: FAMILY MEDICINE | Facility: OTHER | Age: 54
End: 2024-07-16
Attending: NURSE PRACTITIONER
Payer: COMMERCIAL

## 2024-07-16 VITALS
RESPIRATION RATE: 14 BRPM | BODY MASS INDEX: 37.08 KG/M2 | SYSTOLIC BLOOD PRESSURE: 128 MMHG | HEART RATE: 90 BPM | OXYGEN SATURATION: 96 % | WEIGHT: 217.2 LBS | DIASTOLIC BLOOD PRESSURE: 78 MMHG | TEMPERATURE: 95.6 F | HEIGHT: 64 IN

## 2024-07-16 DIAGNOSIS — R52 SEVERE PAIN: ICD-10-CM

## 2024-07-16 DIAGNOSIS — S42.201D CLOSED FRACTURE OF PROXIMAL END OF RIGHT HUMERUS WITH ROUTINE HEALING, UNSPECIFIED FRACTURE MORPHOLOGY, SUBSEQUENT ENCOUNTER: Primary | ICD-10-CM

## 2024-07-16 PROCEDURE — 99213 OFFICE O/P EST LOW 20 MIN: CPT | Mod: 24 | Performed by: NURSE PRACTITIONER

## 2024-07-16 PROCEDURE — G0463 HOSPITAL OUTPT CLINIC VISIT: HCPCS

## 2024-07-16 RX ORDER — OXYCODONE HYDROCHLORIDE 5 MG/1
5 TABLET ORAL EVERY 6 HOURS PRN
Qty: 15 TABLET | Refills: 0 | Status: SHIPPED | OUTPATIENT
Start: 2024-07-16 | End: 2024-07-20

## 2024-07-16 ASSESSMENT — PAIN SCALES - GENERAL: PAINLEVEL: SEVERE PAIN (7)

## 2024-07-16 ASSESSMENT — PATIENT HEALTH QUESTIONNAIRE - PHQ9
SUM OF ALL RESPONSES TO PHQ QUESTIONS 1-9: 8
SUM OF ALL RESPONSES TO PHQ QUESTIONS 1-9: 8
10. IF YOU CHECKED OFF ANY PROBLEMS, HOW DIFFICULT HAVE THESE PROBLEMS MADE IT FOR YOU TO DO YOUR WORK, TAKE CARE OF THINGS AT HOME, OR GET ALONG WITH OTHER PEOPLE: VERY DIFFICULT

## 2024-07-16 NOTE — PROGRESS NOTES
Assessment & Plan   Problem List Items Addressed This Visit    None  Visit Diagnoses       Closed fracture of proximal end of right humerus with routine healing, unspecified fracture morphology, subsequent encounter    -  Primary    Relevant Medications    oxyCODONE (ROXICODONE) 5 MG tablet    Other Relevant Orders    Orthopedic  Referral    Severe pain        Relevant Medications    oxyCODONE (ROXICODONE) 5 MG tablet           1. Closed fracture of proximal end of right humerus with routine healing, unspecified fracture morphology, subsequent encounter  - oxyCODONE (ROXICODONE) 5 MG tablet; Take 1 tablet (5 mg) by mouth every 6 hours as needed for pain  Dispense: 15 tablet; Refill: 0  - Orthopedic  Referral; Future  Patient was seen on 7/12/2024 in the ER here at [facility] following an accidental fall and it was identified on xr that she sustained a proximal humerus fracture. Since then, she has been in severe pain not managed by tylenol 1000 mg every 6 hours, ice, and elevation. She did not receive a prescription for pain medication from the ER and has not been eating or sleeping due to the severity of the pain despite hoping to manage with tylenol. The sling seemed to be ill fitting and she unfortunately is unable to take nsaid medications due to recent gastric bypass surgery on 6/3/2024. I have provided patient with a small prescription of oxycodone to help get her through the upcoming days including her 4 hour long car ride home.  reviewed; no past history of substance misuse or addiction. She will not take her klonopin while she takes oxycodone due to risks of respiratory depression. Follow with orthopedic provider closer to home is recommended. A new sling was provided today with abdominal stabilization support which she verbalized seem to fit better and help with discomfort and positioning. Continue with ice, elevation, and tylenol 1000 mg every 6 hours for pain management.     2. Severe  "pain  - oxyCODONE (ROXICODONE) 5 MG tablet; Take 1 tablet (5 mg) by mouth every 6 hours as needed for pain  Dispense: 15 tablet; Refill: 0  See above.        BMI  Estimated body mass index is 37.28 kg/m  as calculated from the following:    Height as of this encounter: 1.626 m (5' 4\").    Weight as of this encounter: 98.5 kg (217 lb 3.2 oz).   Weight management plan: patient recently had gastric bypass surgery - 6 weeks post op.     CONSULTATION/REFERRAL to new referral placed for clinic nearby [place] as she lives in [place]. Ortho had previously been placed by ER physician.     No follow-ups on file.      Mk Vance is a 54 year old, presenting for the following health issues:  Arm Pain    History of Present Illness       Reason for visit:  Pain    She eats 0-1 servings of fruits and vegetables daily.She consumes 0 sweetened beverage(s) daily.She exercises with enough effort to increase her heart rate 10 to 19 minutes per day.  She exercises with enough effort to increase her heart rate 4 days per week.   She is taking medications regularly.     She is here to follow up on fracture from 4 days ago. She has not had any additional injury or falls. The arm is severely painful. She is tearful and not sleeping or eating due to severe pain. She has been attempting to manage the pain by taking 1000 mg of tylenol every 6 hours. She is here at her [facility] on vacation and has a 4.5 hour drive back home to [place] on Sunday. She also is status post gastric bypass surgery and cannot take nsaid medication. She is having a very hard time due to the pain. Now she reports other joints/muscles in her body hurt due to the pain of her shoulder. She has no past history of substance abuse or addition problems. She does take clonazepam at night but is able to hold off on this. She reports when she has pain medication along with this she itches like crazy.     Was seen in the ER on 7/12/2024 and did not receive a " "prescription for pain medications and verbalizes she was trying to tough it out but is miserable.      She had gastric bypass on 6/3/2024. She had complications from this. She tolerated oxycodone during the hospitalization.     Review of Systems  Constitutional, HEENT, cardiovascular, pulmonary, GI, , musculoskeletal, neuro, skin, endocrine and psych systems are negative, except as otherwise noted.      Objective    /78   Pulse 90   Temp (!) 95.6  F (35.3  C)   Resp 14   Ht 1.626 m (5' 4\")   Wt 98.5 kg (217 lb 3.2 oz)   SpO2 96%   BMI 37.28 kg/m    Body mass index is 37.28 kg/m .  Physical Exam  Vitals and nursing note reviewed.   Constitutional:       Appearance: Normal appearance.   Cardiovascular:      Rate and Rhythm: Normal rate and regular rhythm.      Heart sounds: Normal heart sounds.   Pulmonary:      Effort: Pulmonary effort is normal. No respiratory distress.      Breath sounds: Normal breath sounds.   Musculoskeletal:      Comments: Right shoulder is in sling. CMS of right hand is intact.    Skin:     General: Skin is warm and dry.   Neurological:      General: No focal deficit present.      Mental Status: She is alert and oriented to person, place, and time.   Psychiatric:         Thought Content: Thought content normal.         Judgment: Judgment normal.      Comments: Tearful; in pain and verbalizing distress from this           Recent xray from 7/12 reviewed in chart confirming proximal humerus fracture.             Signed Electronically by: Gabriela Post NP    "

## 2024-07-16 NOTE — NURSING NOTE
Patient presents to clinic with arm pain. SHe does have a closed nondisplaced fracture of proximal end of right humerus. Patient does have a referral to see ortho.  Jesenia Solo LPN ....................  7/16/2024   3:24 PM  EXT [id number]

## 2024-09-03 ENCOUNTER — VIRTUAL VISIT (OUTPATIENT)
Dept: SURGERY | Facility: CLINIC | Age: 54
End: 2024-09-03
Payer: COMMERCIAL

## 2024-09-03 ENCOUNTER — TELEPHONE (OUTPATIENT)
Dept: SURGERY | Facility: CLINIC | Age: 54
End: 2024-09-03

## 2024-09-03 DIAGNOSIS — K90.9 INTESTINAL MALABSORPTION, UNSPECIFIED TYPE: ICD-10-CM

## 2024-09-03 DIAGNOSIS — E66.9 OBESITY (BMI 30-39.9): Primary | ICD-10-CM

## 2024-09-03 DIAGNOSIS — Z98.84 S/P BARIATRIC SURGERY: ICD-10-CM

## 2024-09-03 PROCEDURE — 97803 MED NUTRITION INDIV SUBSEQ: CPT | Mod: 95 | Performed by: DIETITIAN, REGISTERED

## 2024-09-03 NOTE — LETTER
9/3/2024      Pinky Post  [address]  [place] [address]      Dear Colleague,    Thank you for referring your patient, Pinky Post, to the [facility] [place]. Please see a copy of my visit note below.    Pinky Post is a 54 year old who is being evaluated via a billable video visit.      How would you like to obtain your AVS? MyChart  If the video visit is dropped, the invitation should be resent by: Text to cell phone: [phone]  Will anyone else be joining your video visit? No  {If patient encounters technical issues they should call [phone]      Post-op Surgical Weight Loss Diet Evaluation     Assessment:  Pt presents for 3 months post-op RD visit, s/p RNY on 6/3/2024 with Dr. Rizo. Today we reviewed current eating habits and level of physical activity, and instructed on the changes that are required for successful bariatric outcomes.    Patient Progress: Patient stated that she broken her arm (re-checked Thursday) and leg (will have initial visit this Thursday). Patient stated that she has been throwing up but has not been mindful with her intake of certain goods. Patient is noticing that she is only able to have 1/4 - 1/2 cup of protein per meal. Patient reported that she was unaware that she could start incorporating fruit to her diet. Discussed the importance of protein first, then a fibrous item and then a starch. Encouraged patient to be mindful about  fluids from meals. Patient stated that she has been taking pain medications on an empty stomach - discussed the cause of nausea and vomiting from pain medication without a food buffer in the stomach. Patient allowed RD to call bariatric RN to let them know about the broken bones, pain medications, and reactions to pain medications - VM left with RN.     Initial weight: 231.5 lbs  Current weight: 204 lbs   Weight change: 27.5 lbs, 11.9% weight loss    BMI:  35.02    Patient Active Problem List   Diagnosis     Fibromyalgia     Multiple joint pain     Fatigue     Dyslipidemia     Morbid obesity (H)     Morbid (severe) obesity due to excess calories (H)   Diabetes: no, A1C of 5.4% on 1/24/2024    Vitamins   Multi Vit with Iron: yes  Calcium Citrate: yes  B12: yes  D3: yes    Nausea: yes  Vomiting: yes    Diet Recall/Time:   Breakfast: protein coffee (decaf) Or will have an egg  Lunch: leftovers  Dinner: ground beef or ground meat with gravy/sauce    Proteins/Veg/Fruits/CHO (NOT well tolerated): chicken if it is too dry, soup, juice    Estimated protein intake: ~60 grams    Estimated portion size per meals: 1/4-1/2 cup/meal    Fluid-meal separation:  Fluids are not always  30min before and 30 minutes after meals.    Fluid Intake  Water: 64 ounce - plain water, sometimes a zero sugar juice  Caffeine: decaf coffee  Carbonation: pop (sometimes) - one will last patient three days    Exercise: limited d/t broken arm and leg      PES statement:      (NC-1.4) Altered GI Function related to Alteration in gastrointestinal tract structure and/or function/ Decreased functional length of the GI tract as evidenced by Weight loss of 11.9% initial body weight; Gastric bypass surgery    (NB-1.7) Undesirable food choices related to Failure to adjust for lifestyle changes as evidenced by low protein intake at meals;  mindless eating ; drinking with meals, not chewing each bite to applesauce consistency; consuming caffeine/carbonation daily, and no structured activity regimen    (NI-5.7.1) Inadequate protein intake related to Gastric bypass causing increased nutrient needs due to malabsorption/ Decreased ability to consume sufficient protein as evidenced by Estimated intake of protein insufficient to meet requirements    Intervention    Discussion  Discussed 3 Months Post-Op Nutritional Guidelines for RNY  Recommended to consume 15-20gm protein at 3 meals daily, along with protein  "supplement/\"planned protein containing snack\" of 15-30gm protein, to reach goal of 60-80 gm protein daily.  Educated on post-op vitamin regimen: Multi Vit + iron 2x/day, calcium citrate 400-600 mg 2x/day, 6639-5967 mcg of Sublingual B-12 daily, and 5000 IU Vitamin D3 daily (MVI and calcium can be taken at the same time BID)  Reviewed lean protein sources  Bariatric Plate Method-  including lean/low fat protein at each meal, including a vegetable/fruit, and limiting carbohydrate intake to less than 25% of plate volume.     Instructions  Include 15-20gm protein at each meal, along with protein supplement/\"planned protein containing snack\" of 15-30gm protein, to reach goal of 60-80 gm protein daily.  Increase fluid intake to 64oz daily: choose plain or calorie/carbonation-free beverages.  Incorporate daily structured activity, 30-60 minutes most days of the week  Recommended pt to start taking: Multi Vit + iron 2x/day, calcium citrate 400-600 mg 2x/day, 3150-7171 mcg of Sublingual B-12 daily, and 5000 IU Vitamin D3 daily. (MVI and calcium can be taken at the same time)  Read food labels more consistently: keeping total fat grams <10, total sugar grams <10, fiber >3gm per serving.  Increase vegetable/fruit intake, by having a vegetable or fruit with each meal daily.  Practice plate method: 1/2 plate lean/low fat protein source, vegetable/fruit, <25% of plate complex carbohydrates.  Separate fluids 30 minutes before/after meal times.  Practice eating off of smaller plates/bowls, chewing to applesauce consistency, taking 20-30 minutes to eat in a calm/relaxed environment without distractions of tv/email/cell phone.    Handouts provided:  3 months Post-Op Nutritional Guidelines for RNY  Protein Sources  Bariatric Cookbooks and Blogs    Assessment/Plan:    Pt to follow up for 6 months post-op visit with bariatrician     Video-Visit Details    Type of service:  Video Visit    Video Start Time (time video started):  11:25 " AM    Video End Time (time video stopped):  11:51 AM    Originating Location (pt. Location): Home      Distant Location (provider location):  Off-site    Mode of Communication:  Video Conference via [facility]    Physician has received verbal consent for a Video Visit from the patient? Yes    Juliana Sherwood RD             Again, thank you for allowing me to participate in the care of your patient.        Sincerely,        Juliana Sherwood RD

## 2024-09-03 NOTE — PROGRESS NOTES
Pinky Post is a 54 year old who is being evaluated via a billable video visit.      How would you like to obtain your AVS? MyChart  If the video visit is dropped, the invitation should be resent by: Text to cell phone: [phone]  Will anyone else be joining your video visit? No  {If patient encounters technical issues they should call [phone]      Post-op Surgical Weight Loss Diet Evaluation     Assessment:  Pt presents for 3 months post-op RD visit, s/p RNY on 6/3/2024 with Dr. Rizo. Today we reviewed current eating habits and level of physical activity, and instructed on the changes that are required for successful bariatric outcomes.    Patient Progress: Patient stated that she broken her arm (re-checked Thursday) and leg (will have initial visit this Thursday). Patient stated that she has been throwing up but has not been mindful with her intake of certain goods. Patient is noticing that she is only able to have 1/4 - 1/2 cup of protein per meal. Patient reported that she was unaware that she could start incorporating fruit to her diet. Discussed the importance of protein first, then a fibrous item and then a starch. Encouraged patient to be mindful about  fluids from meals. Patient stated that she has been taking pain medications on an empty stomach - discussed the cause of nausea and vomiting from pain medication without a food buffer in the stomach. Patient allowed RD to call bariatric RN to let them know about the broken bones, pain medications, and reactions to pain medications - VM left with RN.     Initial weight: 231.5 lbs  Current weight: 204 lbs   Weight change: 27.5 lbs, 11.9% weight loss    BMI: 35.02    Patient Active Problem List   Diagnosis    Fibromyalgia    Multiple joint pain    Fatigue    Dyslipidemia    Morbid obesity (H)    Morbid (severe) obesity due to excess calories (H)   Diabetes: no, A1C of 5.4% on 1/24/2024    Vitamins   Multi Vit with Iron:  "yes  Calcium Citrate: yes  B12: yes  D3: yes    Nausea: yes  Vomiting: yes    Diet Recall/Time:   Breakfast: protein coffee (decaf) Or will have an egg  Lunch: leftovers  Dinner: ground beef or ground meat with gravy/sauce    Proteins/Veg/Fruits/CHO (NOT well tolerated): chicken if it is too dry, soup, juice    Estimated protein intake: ~60 grams    Estimated portion size per meals: 1/4-1/2 cup/meal    Fluid-meal separation:  Fluids are not always  30min before and 30 minutes after meals.    Fluid Intake  Water: 64 ounce - plain water, sometimes a zero sugar juice  Caffeine: decaf coffee  Carbonation: pop (sometimes) - one will last patient three days    Exercise: limited d/t broken arm and leg      PES statement:      (NC-1.4) Altered GI Function related to Alteration in gastrointestinal tract structure and/or function/ Decreased functional length of the GI tract as evidenced by Weight loss of 11.9% initial body weight; Gastric bypass surgery    (NB-1.7) Undesirable food choices related to Failure to adjust for lifestyle changes as evidenced by low protein intake at meals;  mindless eating ; drinking with meals, not chewing each bite to applesauce consistency; consuming caffeine/carbonation daily, and no structured activity regimen    (NI-5.7.1) Inadequate protein intake related to Gastric bypass causing increased nutrient needs due to malabsorption/ Decreased ability to consume sufficient protein as evidenced by Estimated intake of protein insufficient to meet requirements    Intervention    Discussion  Discussed 3 Months Post-Op Nutritional Guidelines for RNY  Recommended to consume 15-20gm protein at 3 meals daily, along with protein supplement/\"planned protein containing snack\" of 15-30gm protein, to reach goal of 60-80 gm protein daily.  Educated on post-op vitamin regimen: Multi Vit + iron 2x/day, calcium citrate 400-600 mg 2x/day, 4676-9727 mcg of Sublingual B-12 daily, and 5000 IU Vitamin D3 daily " "(MVI and calcium can be taken at the same time BID)  Reviewed lean protein sources  Bariatric Plate Method-  including lean/low fat protein at each meal, including a vegetable/fruit, and limiting carbohydrate intake to less than 25% of plate volume.     Instructions  Include 15-20gm protein at each meal, along with protein supplement/\"planned protein containing snack\" of 15-30gm protein, to reach goal of 60-80 gm protein daily.  Increase fluid intake to 64oz daily: choose plain or calorie/carbonation-free beverages.  Incorporate daily structured activity, 30-60 minutes most days of the week  Recommended pt to start taking: Multi Vit + iron 2x/day, calcium citrate 400-600 mg 2x/day, 5269-8056 mcg of Sublingual B-12 daily, and 5000 IU Vitamin D3 daily. (MVI and calcium can be taken at the same time)  Read food labels more consistently: keeping total fat grams <10, total sugar grams <10, fiber >3gm per serving.  Increase vegetable/fruit intake, by having a vegetable or fruit with each meal daily.  Practice plate method: 1/2 plate lean/low fat protein source, vegetable/fruit, <25% of plate complex carbohydrates.  Separate fluids 30 minutes before/after meal times.  Practice eating off of smaller plates/bowls, chewing to applesauce consistency, taking 20-30 minutes to eat in a calm/relaxed environment without distractions of tv/email/cell phone.    Handouts provided:  3 months Post-Op Nutritional Guidelines for RNY  Protein Sources  Bariatric Cookbooks and Blogs    Assessment/Plan:    Pt to follow up for 6 months post-op visit with bariatrician     Video-Visit Details    Type of service:  Video Visit    Video Start Time (time video started):  11:25 AM    Video End Time (time video stopped):  11:51 AM    Originating Location (pt. Location): Home      Distant Location (provider location):  Off-site    Mode of Communication:  Video Conference via [employer]    Physician has received verbal consent for a Video Visit from " the patient? Yes    Juliana Sherwood RD

## 2024-09-03 NOTE — PATIENT INSTRUCTIONS
[facility]  Nutritional Guidelines  Gastric Bypass 3 Months Post Op    General Guidelines and Helpful Hints:  Eat 3 meals per day + protein supplement(s). No snacks between meals.  Do not skip meals.  This can cause overeating at the next meal and will prevent adequate protein and nutritional intake.  Aim for 60-80 grams of protein per day.   Always eat your protein first. This assists with optimal nutrition and helps you stay full longer.  To achieve daily protein goals, it is recommended to drink approved protein supplement between meals.  Follow appropriate portion size: Use measuring cups to be accurate.    Months Post Op Portion Size per Meal   3 months   cup   4-5 months 1/3 cup   5-6 months   cup     Continue to use saucer/salad plates, infant/toddler silverware to keep portion sizes small and take small bites.  Eat S-L-O-W-L-Y to make each meal last 20-30 minutes. Always stop eating when satisfied.  Use caution with foods containing skins, peels or membranes. Chew well!  Aim for 64 oz. of calorie-free fluids daily.  Continue to avoid caffeine, carbonation and alcohol.  Remember to avoid drinking during meals, 15-30 minutes before and 30 minutes after.  Aim for 30-60 minutes of physical activity most days of the week.  If having trouble tolerating meat, try using a crock-pot, tinfoil tent, steamer or other moist cooking method to create tender meats. Add broth or low-fat gravy to help meat stay moist.   Avoid high sugar and high fat foods to prevent dumping syndrome.  Check nutrition labels for less than 10 grams of sugar and less than 10 grams of fat per serving.  Continue Taking Vitamins/Minerals:  1625-6612 mcg of Sublingual B-12 daily  1 Multivitamin with Iron twice daily (chewable or swallow tabs)  500-600 mg Calcium Citrate twice daily (chewable or swallow tabs)  5000 IU Vitamin D3 daily    Grocery List  Protein:  Fat free Greek or light yogurt (less than 10 grams sugar)  Fat free  or low-fat cottage cheese  String cheese or reduced fat cheese slices  Tuna, salmon, crab, egg, or chicken salad made with light or fat free mayonnaise  Egg or Egg Substitute  Lean/extra lean turkey, beef, bison, venison (ground, sirloin, round, flank)  Pork loin or tenderloin (grilled, baked, broiled)  Fish such as salmon, tuna, trout, tilapia, etc. (grilled, baked, broiled)  Tender cuts of lean (skinless) turkey or chicken  Lean deli meats: turkey, lean ham, chicken, lean roast beef  Beans such as kidney, garbanzo, black, murray, or low-fat/fat free refried beans  Peanut butter (natural preferred). Limit to 1 Tbsp. per day.  Low-fat meatloaf (made with lean ground beef or turkey)  Sloppy Joes made with low-sugar ketchup and lean ground beef or turkey  Soy or vegetable protein (i.e. vegan crumbles, soy/veggie burger, tofu)  Hummus    Vegetables:  Fresh: cooked or raw (as tolerated)  Frozen vegetables  Canned vegetables (low sodium or no salt added, rinse before cooking/eating)  (Ok to have skins/peels/membranes/seeds - just chew well)    Fruits:  Fresh fruit  Frozen fruit (no sugar added)  Canned fruit (packed in its own juice, NOT syrup)  (Ok to have skins/peels/membranes/seeds - just chew well)    Starch:  Unsweetened whole-grain hot cereal (or high fiber cold cereal, dry)  Toasted whole wheat bread or [place] Thins  Whole grain crackers  Baked/boiled/mashed potato/sweet potato  Cooked whole grain pasta, brown rice, or other cooked whole grains  Starchy vegetables: corn, peas, winter squash    Protein Supplement:   Ready to drink protein shake with:  15-30 grams protein per serving  Less than 10 grams total carbohydrate per serving   Protein powder mixed with:   Skim or 1% milk  Low fat or fat free Lactaid milk, plain or no sugar added soymilk  Water     Fats: (use in moderation)  1 teaspoon of soft tub margarine  1 teaspoon olive oil, canola oil, or peanut oil  1 tablespoon of low-fat keith or salad dressing      Sample Menu for 3 months after Gastric Bypass    You do NOT need to eat/drink the full portion sizes listed below  Always stop when you are satisfied    Breakfast   cup of 1% cottage cheese    Lunch 3 Tbsp. tuna salad made with 1 tsp. light keith  1 Tbsp. green beans   Supplement Approved Protein Shake   (Have between meals throughout the day)   Dinner 3 Tbsp. moist cooked chicken breast   1 Tbsp. canned peaches in light syrup     Breakfast 1 egg or   cup egg substitute, scrambled    Lunch 3 Tbsp. sirloin steak  1 Tbsp. baked potato   Supplement Approved Protein Shake   (Have between meals throughout the day)   Dinner 3 Tbsp. grilled, broiled, or baked lemon pepper salmon  1 Tbsp. asparagus     Breakfast   cup light yogurt with 1-2 tsp. whole grain cereal   Lunch   cup thick lentil soup   Dinner 3 Tbsp. pork loin made in a crock pot for added moisture  1 Tbsp. cooked broccoli   Supplement Approved Protein Shake   (Have between meals throughout the day)     Breakfast 1 oz. turkey or low fat breakfast sausage   Lunch 3 Tbsp. 1% cottage cheese  1 Tbsp. peaches   Dinner 3 Tbsp. chili made with ground turkey breast and beans  1 Tbsp. cooked zucchini   Supplement Approved Protein Shake   (Have between meals throughout the day)     Breakfast 1 egg, scrambled with 1-2 tsp. shredded low fat cheese   Lunch 3 Tbsp. low fat lunch meat  2-3 Wheat Thins   Supplement Approved Protein Shake   (Have between meals throughout the day)   Dinner 1 oz. tender skinless/boneless chicken breast       Breakfast   piece whole wheat toast with 1 Tbsp. natural peanut butter   Lunch   cup lean hamburger mixed with marinara sauce (10 grams of sugar or less per serving)   Dinner 3 Tbsp. low fat turkey meat loaf   1 Tbsp. cooked sweet potato   Supplement Approved Protein Shake   (Have between meals throughout the day)     Breakfast 3 Tbsp. scrambled egg or   cup of egg substitute  1 Tbsp. sautéed vegetables   Lunch 3 Tbsp. canned (in water)  chicken made with light mayonnaise  3  Wheat  Thins   Supplement Approved Protein Shake   (Have between meals throughout the day)   Dinner 3 Tbsp. low-fat sloppy joes (made with low sugar ketchup)  1 Tbsp. squash        Lean Protein Sources    Protein Source Portion Calories Grams of Protein                           Nonfat, plain Greek yogurt    (10 grams sugar or less) 3/4 cup (6 oz)  12-17   Light Yogurt (10 grams sugar or less) 3/4 cup (6 oz)  6-8   Protein Shake 1 shake 110-180 15-30   Skim/1% Milk or lactose-free milk 1 cup ( 8 oz)  8   Plain or light, flavored soymilk 1 cup  7-8   Plain or light, hemp milk 1 cup 110 6   Fat Free or 1% Cottage Cheese 1/2 cup 90 15   Part skim ricotta cheese 1/2 cup 100 14   Part skim or reduced fat cheese slices 1/4cup, 3 dice 65-80 8     Mozzarella String Cheese 1 80 8   Canned tuna, chicken, crab or salmon  (canned in water)  1/2 cup 100 15-20   White fish (broiled, grilled, baked) 3 ounces 100 21   [place]/Tuna (broiled, grilled, baked) 3 ounces 150-180 21   Shrimp, Scallops, Lobster, Crab 3 ounces 100 21   Pork loin, Pork Tenderloin 3 ounces 150 21   Boneless, skinless chicken /turkey breast                          (broiled, grilled, baked) 3 ounces 120 21   [place], [place], [place], and Venison 3 ounces 120 21   Lean cuts of red meat and pork (sirloin,   round, tenderloin, flank, ground 93%-96%) 3 ounces 170 21   Lean or Extra Lean Ground Turkey 1/2 cup 150 20   90-95% Lean [place] Burger 1 byr 140-180 21   Low-fat casserole with lean meat 3/4 cup 200 17   Luncheon Meats                                                        (turkey, lean ham, roast beef, chicken) 3 ounces    100 21   Egg (boiled, poached, scrambled) 1 Egg 60 7   Egg Substitute 1/2 cup 70 10   Nuts (limit to 1 serving per day)  3 Tbsp. 150 7   Nut [place] (peanut, almond)  Limit to 1 serving or less daily 1 Tbsp. 90 4   Soy Burger (varies) 1  10-15   Danilo Kramer Pinto  Beans/Edamame 1/2 cup 110 7-9   Refried Beans 1/2 cup 100 7   Kidney and Lima beans 1/2 cup 110 7   Tempeh 3 oz 175 18   Vegan crumbles 1/2 cup 100 14   Tofu 1/2 cup 110 14   Chili (beans and extra lean beef or turkey) 1 cup 200 23   Lentils 1/2 cup 115 9   Black Bean Soup 1 cup 175 12          Fresh Start Bariatric Cookbook by Rut Traylor    The Gastric Sleeve Bariatric Cookbook by Rut Traylor    The Complete Bariatric Cookbook and Meal Plan by Mary Dong    The Easy 5 Ingredient Bariatric Cookbook by Mary Sauer    The High Protein Bariatric Cookbook by Nita Gonzalez    [email or web address]/[email or web address]-[email or web address]    [email or web address]    [email or web address]://[email or web address]/mbd-recipes

## 2024-09-03 NOTE — TELEPHONE ENCOUNTER
Patient had a visit with Juliana VAZQUEZ RD who mentioned that the patient has been having some difficulties with tolerating foods recently.  Some things are due to not following the dietary recommendations which they discussed during their visit (drinking with meals, dry meats, not being mindful with certain foods and amounts, etc.), but some have been due to pain medications needed due to recent arm fracture and rolled ankle which may also be broken.  She is finding the support that she needs but feels like she has been thrown off by some of these extra things since her surgery.  She feels confident after her conversation with Juliana that she can navigate the food aspect of things now and has some things to put her focus on.  She was also pleased with the results she has had so far even though acknowledging that she has had some stalling in her weight loss likely due to the healing her body has been doing on her arm and now foot.  She is also meeting with Zeb Arriaga, PhD tomorrow for her three month check in which she is happy about.  She will reach out if she has any specific questions for us, but at this time believes that making the changes discussed with Juliana are the most important for where she is at right now.    Sheila Hernández RN

## 2024-09-04 ENCOUNTER — VIRTUAL VISIT (OUTPATIENT)
Dept: SURGERY | Facility: CLINIC | Age: 54
End: 2024-09-04
Payer: COMMERCIAL

## 2024-09-04 DIAGNOSIS — E66.01 MORBID OBESITY (H): Primary | ICD-10-CM

## 2024-09-04 NOTE — PROGRESS NOTES
Virtual Visit Details    Type of service:  Video Visit   Video Start Time:  11:25 AM  Video End Time: 11:50 AM    Originating Location (pt. Location): Home    Distant Location (provider location):  Off-site  Platform used for Video Visit: Deana Vance is 3 months s/p RNY with Dr. Rizo. She admits that she has had a challenging outcome because she injured herself twice and broke bones in her body. This has limited her ability to be active and has frustrated herself. As a result, her eating behaviors have not been particularly good recently. She is hoping to get back on track. She also has felt more distressed than usual, but this will likely improve once she is able to feel more healed. She was encouraged to come to support group and follow up with this clinician as needed. F32.9; F43.9; E66.01

## 2024-09-10 ENCOUNTER — TELEPHONE (OUTPATIENT)
Dept: SURGERY | Facility: CLINIC | Age: 54
End: 2024-09-10
Payer: COMMERCIAL

## 2024-09-10 NOTE — TELEPHONE ENCOUNTER
Pt called in with concerns about her hair falling out. She is 12 weeks s/p RNY and is already starting to lose her hair. She has also broken 2 bones since since surgery. She is taking all vitamins as instructed and will start taking a hair, skin and nails supplement that she bought. We discussed stress on her body and the normal progression of hair loss following bariatric surgery. Pt is concerned and would like to get 's thoughts so I will route the message to her. Pt verbalized understanding.    Serenity Miller RN, CBN  [facility]  P [phone]  F [phone]

## 2024-11-04 ENCOUNTER — MYC MEDICAL ADVICE (OUTPATIENT)
Dept: SURGERY | Facility: CLINIC | Age: 54
End: 2024-11-04
Payer: COMMERCIAL

## 2024-11-04 DIAGNOSIS — Z98.84 S/P BARIATRIC SURGERY: Primary | ICD-10-CM

## 2024-11-04 DIAGNOSIS — K90.9 INTESTINAL MALABSORPTION, UNSPECIFIED TYPE: ICD-10-CM

## 2024-11-04 NOTE — TELEPHONE ENCOUNTER
Serenity Miller, RN  P Bariatric Surgery [facility]  6 mos s/p RNY post op visit w/Sund on 12/3/2024

## 2024-12-03 ENCOUNTER — VIRTUAL VISIT (OUTPATIENT)
Dept: SURGERY | Facility: CLINIC | Age: 54
End: 2024-12-03
Payer: COMMERCIAL

## 2024-12-03 VITALS — BODY MASS INDEX: 32.64 KG/M2 | WEIGHT: 191.2 LBS | HEIGHT: 64 IN

## 2024-12-03 DIAGNOSIS — R11.0 NAUSEA: ICD-10-CM

## 2024-12-03 DIAGNOSIS — K91.2 POSTOPERATIVE MALABSORPTION: Primary | ICD-10-CM

## 2024-12-03 PROCEDURE — 99215 OFFICE O/P EST HI 40 MIN: CPT | Mod: 95 | Performed by: FAMILY MEDICINE

## 2024-12-03 PROCEDURE — G2211 COMPLEX E/M VISIT ADD ON: HCPCS | Mod: 95 | Performed by: FAMILY MEDICINE

## 2024-12-03 RX ORDER — ONDANSETRON 4 MG/1
4 TABLET, ORALLY DISINTEGRATING ORAL EVERY 8 HOURS PRN
Qty: 30 TABLET | Refills: 1 | Status: SHIPPED | OUTPATIENT
Start: 2024-12-03 | End: 2025-03-03

## 2024-12-03 ASSESSMENT — PAIN SCALES - GENERAL: PAINLEVEL_OUTOF10: NO PAIN (0)

## 2024-12-03 NOTE — LETTER
12/3/2024      Pinky Post  [address]  [place] [address]      Dear Colleague,    Thank you for referring your patient, Pinky Post, to the [facility] [place]. Please see a copy of my visit note below.    Virtual Visit Details    Type of service:  Video Visit   Video Start Time:  11:00  Video End Time: 11:40    Originating Location (pt. Location): Home    Distant Location (provider location):  On-site  Platform used for Video Visit: Deana change to Doximity due to echo on patient side      Bariatric Follow Up Visit with a History of Previous Bariatric Surgery     Date of visit: 12/3/2024  Physician: Fawn Young MD, MD  Primary Care Provider:  Pratibha Pastrana   54 year old  female    Date of Surgery: 6/3/2024  Initial Weight: 231#  Initial BMI: 40.37  Today's Weight:   Wt Readings from Last 1 Encounters:   12/03/24 86.7 kg (191 lb 3.2 oz)     Body mass index is 32.8 kg/m .      Assessment and Plan     Assessment: Pinky is a 54 year old year old female who is 1 yr s/p  Farheen en Y Gastric Bypass with Dr. Rizo  Pinky TAYLOR Sincere feels as if she has not achieved the goals she hoped to accomplish through bariatric surgery and weight loss.    Encounter Diagnoses   Name Primary?     Postoperative malabsorption Yes     Nausea          Current Outpatient Medications:      ondansetron (ZOFRAN ODT) 4 MG ODT tab, Take 1 tablet (4 mg) by mouth every 8 hours as needed for nausea., Disp: 30 tablet, Rfl: 1     acetaminophen (TYLENOL) 500 MG tablet, Take 2 tablets (1,000 mg) by mouth every 6 hours as needed Do not start taking after surgery until advised by dietician in post-op class., Disp: , Rfl:      buPROPion (WELLBUTRIN XL) 150 MG 24 hr tablet, Take 1 tablet (150 mg) by mouth every morning Can restart 6 weeks after surgery on (Patient not taking: Reported on 12/3/2024), Disp: , Rfl:      buPROPion (WELLBUTRIN) 75 MG tablet,  Take 1 tablet (75 mg) by mouth 2 times daily, Disp: , Rfl:      clonazePAM (KLONOPIN) 1 MG tablet, Take 2 mg by mouth nightly as needed for sleep, Disp: , Rfl:      cyanocobalamin (VITAMIN B-12) 1000 MCG sublingual tablet, Place 1 tablet (1,000 mcg) under the tongue daily Start right after surgery., Disp: 90 tablet, Rfl: 3     fluticasone propionate (FLONASE) 50 mcg/actuation nasal spray, [FLUTICASONE PROPIONATE (FLONASE) 50 MCG/ACTUATION NASAL SPRAY] 2 sprays into each nostril., Disp: , Rfl:      hydrOXYzine HCL (ATARAX) 25 MG tablet, Take 1 tablet by mouth daily at bedtime. May take one tablet as needed for anxiety, Disp: , Rfl:      oxyCODONE (ROXICODONE) 5 MG tablet, Take 1 tablet (5 mg) by mouth every 6 hours as needed for pain or severe pain, Disp: 12 tablet, Rfl: 0     Pediatric Multivitamins-Iron (MULTIVITAMINS PLUS IRON CHILD) 18 MG CHEW, Take 1 chew tab by mouth 2 times daily Ok to substitute with any chewable that contains 18 mg of iron, Vitamin A, Thiamine and Zinc., Disp: 180 tablet, Rfl: 3     venlafaxine (EFFEXOR XR) 150 MG 24 hr capsule, Take by mouth daily Can restart in 6 weeks when able to take medications whole (Patient not taking: Reported on 12/3/2024), Disp: , Rfl:      venlafaxine (EFFEXOR) 50 MG tablet, Take 1 tablet (50 mg) by mouth 3 times daily (with meals), Disp: , Rfl:     Plan: Suspect eating too much too fast, advancing diet quickly as a reward for suffering. Return to lab to have rest of labs drawn. Retry to change back to XR meds at 1 year post op. Recommend support groups. Nausea with smells-zofran OK.     RD 3 mo and annual 12 mo with labs    Bariatric Surgery Review     Interim History/LifeChanges: Maintaining a 40# weight loss. She had been falling immediately after surgery. She has not been falling recently. She has had multiple fractures. She cannot get under 190# and is frustrated with the plateau. She can walk now so is exercising more. July slipped and broke her arm on a  rock. September rolled her ankle, stepping in a pot hole and fractured her ankle. She had to do a lot of PT and continues. She went to have her labs drawn and the vitamin levels were not drawn. Dry heaves with smell of grease    Patient Concerns: plateau.  Appetite (1-10): rewarding with food  GERD: no    Medication changes: no    Vitamin Intake:   B-12   SL most days   MVI  Sadler's  one most days   Vitamin D  D3  125mcg   Calcium   Citrate 2 daily     Other                LABS: ordered    Nausea no  Vomiting yes  Constipation no  Diarrhea no  Rashes   Hair Loss yes  Calf tenderness no  Breathing difficulty no  Reactive Hypoglycemia hasn't had  Light Headedness better now   Moods euthymic    12 point ROS as above and otherwise negative      Habits:  Alcohol: no  Tobacco: no  Caffeine coffee  NSAIDS no  Exercise Routine: no exercise routine. Going to [facility] twice a week for her shoulder, has walked twice on her treadmill/walking pad  3 meals/day B: oikios yogurt, protein coffee L: roll up with turkey and low fat keith and cheese, lettuce, tomato D: lean ground beef S: occ  Protein first typically  60grams/day  Water Separate from meals yes  Calorie Containing Beverages no  Restaurant eating/wk not at all  Sleeping nocturnal-up all night  Stress getting better-was   CPAP: NA  Contraception: vasectomy, ablation  DEXA:NA    Social History     Social History     Socioeconomic History     Marital status: Single     Spouse name: Not on file     Number of children: Not on file     Years of education: Not on file     Highest education level: Not on file   Occupational History     Not on file   Tobacco Use     Smoking status: Former     Current packs/day: 0.00     Average packs/day: 1 pack/day for 24.0 years (24.0 ttl pk-yrs)     Types: Cigarettes     Start date: 2000     Quit date: 2024     Years since quittin.8     Smokeless tobacco: Never     Tobacco comments:     Age 22. Quit for 7 years at one time. 24yrs  total up to 1ppd   Vaping Use     Vaping status: Never Used   Substance and Sexual Activity     Alcohol use: Not Currently     Alcohol/week: 0.0 - 4.0 standard drinks of alcohol     Comment: 0-4     Drug use: Not Currently     Sexual activity: Not on file   Other Topics Concern     Not on file   Social History Narrative    Lives with her pro [employer]. 2 grown daughters 31 and 35. Disabled due to fibromyalgia, chronic fatigue. Prior worked for the [employer]      Social Drivers of Health     Financial Resource Strain: Low Risk  (6/12/2023)    [facility]    Financial Resource Strain      Difficulty of Paying Living Expenses: 3      Difficulty of Paying Living Expenses: Not on file   Food Insecurity: No Food Insecurity (6/12/2023)    [facility]    Food Insecurity      Worried About Running Out of Food in the Last Year: 1   Transportation Needs: No Transportation Needs (6/12/2023)    [facility]    Transportation Needs      Lack of Transportation (Medical): 1   Physical Activity: Not on file   Stress: Not on file   Social Connections: Unknown (6/14/2024)    Received from [facility]    Social Connections      Frequency of Communication with Friends and Family: Not on file   Interpersonal Safety: Low Risk  (7/16/2024)    Interpersonal Safety      Do you feel physically and emotionally safe where you currently live?: Yes      Within the past 12 months, have you been hit, slapped, kicked or otherwise physically hurt by someone?: No      Within the past 12 months, have you been humiliated or emotionally abused in other ways by your partner or ex-partner?: No   Housing Stability: Low Risk   (6/12/2023)    [facility]    Housing Stability      Unable to Pay for Housing in the Last Year: 1       Past Medical History     Past Medical History:   Diagnosis Date     Adrenal nodule (H)      Anxiety      Benzodiazepine dependence (H)      Cholecystitis      Depression      Dyslipidemia      Fatigue      Fibromyalgia 01/24/2024     Gastroesophageal reflux disease      Migraine      Morbid obesity (H)      Morbid obesity (H) 01/24/2024     Multiple joint pain      PTSD (post-traumatic stress disorder)      Tobacco use disorder      Problem List     Patient Active Problem List   Diagnosis     Fibromyalgia     Multiple joint pain     Fatigue     Dyslipidemia     Morbid obesity (H)     Morbid (severe) obesity due to excess calories (H)     Medications       Current Outpatient Medications:      ondansetron (ZOFRAN ODT) 4 MG ODT tab, Take 1 tablet (4 mg) by mouth every 8 hours as needed for nausea., Disp: 30 tablet, Rfl: 1     acetaminophen (TYLENOL) 500 MG tablet, Take 2 tablets (1,000 mg) by mouth every 6 hours as needed Do not start taking after surgery until advised by dietician in post-op class., Disp: , Rfl:      buPROPion (WELLBUTRIN XL) 150 MG 24 hr tablet, Take 1 tablet (150 mg) by mouth every morning Can restart 6 weeks after surgery on (Patient not taking: Reported on 12/3/2024), Disp: , Rfl:      buPROPion (WELLBUTRIN) 75 MG tablet, Take 1 tablet (75 mg) by mouth 2 times daily, Disp: , Rfl:      clonazePAM (KLONOPIN) 1 MG tablet, Take 2 mg by mouth nightly as needed for sleep, Disp: , Rfl:      cyanocobalamin (VITAMIN B-12) 1000 MCG sublingual tablet, Place 1 tablet (1,000 mcg) under the tongue daily Start right after surgery., Disp: 90 tablet, Rfl: 3     fluticasone propionate (FLONASE) 50 mcg/actuation nasal spray, [FLUTICASONE PROPIONATE (FLONASE) 50 MCG/ACTUATION NASAL SPRAY] 2 sprays into each nostril., Disp:  ", Rfl:      hydrOXYzine HCL (ATARAX) 25 MG tablet, Take 1 tablet by mouth daily at bedtime. May take one tablet as needed for anxiety, Disp: , Rfl:      oxyCODONE (ROXICODONE) 5 MG tablet, Take 1 tablet (5 mg) by mouth every 6 hours as needed for pain or severe pain, Disp: 12 tablet, Rfl: 0     Pediatric Multivitamins-Iron (MULTIVITAMINS PLUS IRON CHILD) 18 MG CHEW, Take 1 chew tab by mouth 2 times daily Ok to substitute with any chewable that contains 18 mg of iron, Vitamin A, Thiamine and Zinc., Disp: 180 tablet, Rfl: 3     venlafaxine (EFFEXOR XR) 150 MG 24 hr capsule, Take by mouth daily Can restart in 6 weeks when able to take medications whole (Patient not taking: Reported on 12/3/2024), Disp: , Rfl:      venlafaxine (EFFEXOR) 50 MG tablet, Take 1 tablet (50 mg) by mouth 3 times daily (with meals), Disp: , Rfl:    Surgical History     Past Surgical History  She has a past surgical history that includes Cholecystectomy; Tonsillectomy; Ovarian Cyst Removal; Breast surgery; Abdominoplasty; cystourethroscopy (N/A, 03/30/2021); Creation, Vaginal Wall Sling (N/A, 09/28/2023); colonoscopy; Dumas Tooth Extraction; Creation, Gastric Bypass, Farheen-En-Y, Laparoscopic (N/A, 6/3/2024); and Laparoscopic lysis adhesions (N/A, 6/3/2024).    Objective-Exam     Constitutional:  Ht 1.626 m (5' 4.02\")   Wt 86.7 kg (191 lb 3.2 oz)   BMI 32.80 kg/m    General:  Pleasant and in no acute distress     Psychiatric: alert and oriented X3, mood and affect normal    Counseling     We reviewed the important post op bariatric recommendations:  -eating 3 meals daily  -eating protein first, getting >60gm protein daily  -eating slowly, chewing food well  -avoiding/limiting calorie containing beverages  -drinking water 15-30 minutes before or after meals  -choosing wheat, not white with breads, crackers, pastas, esthela, bagels, tortillas, rice  -limiting restaurant or cafeteria eating to twice a week or less    We discussed the importance of " restorative sleep and stress management in maintaining a healthy weight.  We discussed the National Weight Control Registry healthy weight maintenance strategies and ways to optimize metabolism.  We discussed the importance of physical activity including cardiovascular and strength training in maintaining a healthier weight.    We discussed the importance of life-long vitamin supplementation and life-long  follow-up.    Pinky was reminded that, to avoid marginal ulcers she should avoid tobacco at all, alcohol in excess, caffeine in excess, and NSAIDS (unless indicated for cardioprotection or othewise and opposed by a PPI).    Fawn Young MD, MD, [facility].  12/3/2024  11:11 AM  Total time spent on the date of this encounter doing: chart review, review of test results, patient visit, physical exam, education, counseling, developing plan of care, and documenting = 30 minutes.      Again, thank you for allowing me to participate in the care of your patient.        Sincerely,        Fawn Young MD

## 2024-12-03 NOTE — PROGRESS NOTES
Virtual Visit Details    Type of service:  Video Visit   Video Start Time:  11:00  Video End Time: 11:40    Originating Location (pt. Location): Home    Distant Location (provider location):  On-site  Platform used for Video Visit: Deana change to Doximity due to echo on patient side      Bariatric Follow Up Visit with a History of Previous Bariatric Surgery     Date of visit: 12/3/2024  Physician: Fawn Young MD, MD  Primary Care Provider:  Pratibha Pastrana   54 year old  female    Date of Surgery: 6/3/2024  Initial Weight: 231#  Initial BMI: 40.37  Today's Weight:   Wt Readings from Last 1 Encounters:   12/03/24 86.7 kg (191 lb 3.2 oz)     Body mass index is 32.8 kg/m .      Assessment and Plan     Assessment: Pinky is a 54 year old year old female who is 1 yr s/p  Farheen en Y Gastric Bypass with Dr. Darrius Post feels as if she has not achieved the goals she hoped to accomplish through bariatric surgery and weight loss.    Encounter Diagnoses   Name Primary?    Postoperative malabsorption Yes    Nausea          Current Outpatient Medications:     ondansetron (ZOFRAN ODT) 4 MG ODT tab, Take 1 tablet (4 mg) by mouth every 8 hours as needed for nausea., Disp: 30 tablet, Rfl: 1    acetaminophen (TYLENOL) 500 MG tablet, Take 2 tablets (1,000 mg) by mouth every 6 hours as needed Do not start taking after surgery until advised by dietician in post-op class., Disp: , Rfl:     buPROPion (WELLBUTRIN XL) 150 MG 24 hr tablet, Take 1 tablet (150 mg) by mouth every morning Can restart 6 weeks after surgery on (Patient not taking: Reported on 12/3/2024), Disp: , Rfl:     buPROPion (WELLBUTRIN) 75 MG tablet, Take 1 tablet (75 mg) by mouth 2 times daily, Disp: , Rfl:     clonazePAM (KLONOPIN) 1 MG tablet, Take 2 mg by mouth nightly as needed for sleep, Disp: , Rfl:     cyanocobalamin (VITAMIN B-12) 1000 MCG sublingual tablet, Place 1 tablet (1,000 mcg) under the tongue daily  Start right after surgery., Disp: 90 tablet, Rfl: 3    fluticasone propionate (FLONASE) 50 mcg/actuation nasal spray, [FLUTICASONE PROPIONATE (FLONASE) 50 MCG/ACTUATION NASAL SPRAY] 2 sprays into each nostril., Disp: , Rfl:     hydrOXYzine HCL (ATARAX) 25 MG tablet, Take 1 tablet by mouth daily at bedtime. May take one tablet as needed for anxiety, Disp: , Rfl:     oxyCODONE (ROXICODONE) 5 MG tablet, Take 1 tablet (5 mg) by mouth every 6 hours as needed for pain or severe pain, Disp: 12 tablet, Rfl: 0    Pediatric Multivitamins-Iron (MULTIVITAMINS PLUS IRON CHILD) 18 MG CHEW, Take 1 chew tab by mouth 2 times daily Ok to substitute with any chewable that contains 18 mg of iron, Vitamin A, Thiamine and Zinc., Disp: 180 tablet, Rfl: 3    venlafaxine (EFFEXOR XR) 150 MG 24 hr capsule, Take by mouth daily Can restart in 6 weeks when able to take medications whole (Patient not taking: Reported on 12/3/2024), Disp: , Rfl:     venlafaxine (EFFEXOR) 50 MG tablet, Take 1 tablet (50 mg) by mouth 3 times daily (with meals), Disp: , Rfl:     Plan: Suspect eating too much too fast, advancing diet quickly as a reward for suffering. Return to lab to have rest of labs drawn. Retry to change back to XR meds at 1 year post op. Recommend support groups. Nausea with smells-zofran OK.     RD 3 mo and annual 12 mo with labs    Bariatric Surgery Review     Interim History/LifeChanges: Maintaining a 40# weight loss. She had been falling immediately after surgery. She has not been falling recently. She has had multiple fractures. She cannot get under 190# and is frustrated with the plateau. She can walk now so is exercising more. July slipped and broke her arm on a rock. September rolled her ankle, stepping in a pot hole and fractured her ankle. She had to do a lot of PT and continues. She went to have her labs drawn and the vitamin levels were not drawn. Dry heaves with smell of grease    Patient Concerns: plateau.  Appetite (1-10):  rewarding with food  GERD: no    Medication changes: no    Vitamin Intake:   B-12   SL most days   MVI  [place]  one most days   Vitamin D  D3  125mcg   Calcium   Citrate 2 daily     Other                LABS: ordered    Nausea no  Vomiting yes  Constipation no  Diarrhea no  Rashes   Hair Loss yes  Calf tenderness no  Breathing difficulty no  Reactive Hypoglycemia hasn't had  Light Headedness better now   Moods euthymic    12 point ROS as above and otherwise negative      Habits:  Alcohol: no  Tobacco: no  Caffeine coffee  NSAIDS no  Exercise Routine: no exercise routine. Going to [facility] twice a week for her shoulder, has walked twice on her treadmill/walking pad  3 meals/day B: oikios yogurt, protein coffee L: roll up with turkey and low fat keith and cheese, lettuce, tomato D: lean ground beef S: occ  Protein first typically  60grams/day  Water Separate from meals yes  Calorie Containing Beverages no  Restaurant eating/wk not at all  Sleeping nocturnal-up all night  Stress getting better-was   CPAP: NA  Contraception: vasectomy, ablation  DEXA:NA    Social History     Social History     Socioeconomic History    Marital status: Single     Spouse name: Not on file    Number of children: Not on file    Years of education: Not on file    Highest education level: Not on file   Occupational History    Not on file   Tobacco Use    Smoking status: Former     Current packs/day: 0.00     Average packs/day: 1 pack/day for 24.0 years (24.0 ttl pk-yrs)     Types: Cigarettes     Start date: 2000     Quit date: 2024     Years since quittin.8    Smokeless tobacco: Never    Tobacco comments:     Age 22. Quit for 7 years at one time. 24yrs total up to 1ppd   Vaping Use    Vaping status: Never Used   Substance and Sexual Activity    Alcohol use: Not Currently     Alcohol/week: 0.0 - 4.0 standard drinks of alcohol     Comment: 0-4    Drug use: Not Currently    Sexual activity: Not on file   Other Topics Concern    Not  on file   Social History Narrative    Lives with her pro [employer]. 2 grown daughters 31 and 35. Disabled due to fibromyalgia, chronic fatigue. Prior worked for the [employer]      Social Drivers of Health     Financial Resource Strain: Low Risk  (6/12/2023)    Received from [employer] [facility], [facility]    Financial Resource Strain     Difficulty of Paying Living Expenses: 3     Difficulty of Paying Living Expenses: Not on file   Food Insecurity: No Food Insecurity (6/12/2023)    Received from [employer] [facility], [facility]    Food Insecurity     Worried About Running Out of Food in the Last Year: 1   Transportation Needs: No Transportation Needs (6/12/2023)    Received from [employer] [facility], [facility]    Transportation Needs     Lack of Transportation (Medical): 1   Physical Activity: Not on file   Stress: Not on file   Social Connections: Unknown (6/14/2024)    [facility]    Social Connections     Frequency of Communication with Friends and Family: Not on file   Interpersonal Safety: Low Risk  (7/16/2024)    Interpersonal Safety     Do you feel physically and emotionally safe where you currently live?: Yes     Within the past 12 months, have you been hit, slapped, kicked or otherwise physically hurt by someone?: No     Within the past 12 months, have you been humiliated or emotionally abused in other ways by your partner or ex-partner?: No   Housing Stability: Low Risk  (6/12/2023)    Received from [employer] [facility], [facility]    Housing Stability     Unable to Pay for Housing in the Last Year: 1       Past Medical History     Past Medical History:   Diagnosis Date    Adrenal nodule (H)     Anxiety      Benzodiazepine dependence (H)     Cholecystitis     Depression     Dyslipidemia     Fatigue     Fibromyalgia 01/24/2024    Gastroesophageal reflux disease     Migraine     Morbid obesity (H)     Morbid obesity (H) 01/24/2024    Multiple joint pain     PTSD (post-traumatic stress disorder)     Tobacco use disorder      Problem List     Patient Active Problem List   Diagnosis    Fibromyalgia    Multiple joint pain    Fatigue    Dyslipidemia    Morbid obesity (H)    Morbid (severe) obesity due to excess calories (H)     Medications       Current Outpatient Medications:     ondansetron (ZOFRAN ODT) 4 MG ODT tab, Take 1 tablet (4 mg) by mouth every 8 hours as needed for nausea., Disp: 30 tablet, Rfl: 1    acetaminophen (TYLENOL) 500 MG tablet, Take 2 tablets (1,000 mg) by mouth every 6 hours as needed Do not start taking after surgery until advised by dietician in post-op class., Disp: , Rfl:     buPROPion (WELLBUTRIN XL) 150 MG 24 hr tablet, Take 1 tablet (150 mg) by mouth every morning Can restart 6 weeks after surgery on (Patient not taking: Reported on 12/3/2024), Disp: , Rfl:     buPROPion (WELLBUTRIN) 75 MG tablet, Take 1 tablet (75 mg) by mouth 2 times daily, Disp: , Rfl:     clonazePAM (KLONOPIN) 1 MG tablet, Take 2 mg by mouth nightly as needed for sleep, Disp: , Rfl:     cyanocobalamin (VITAMIN B-12) 1000 MCG sublingual tablet, Place 1 tablet (1,000 mcg) under the tongue daily Start right after surgery., Disp: 90 tablet, Rfl: 3    fluticasone propionate (FLONASE) 50 mcg/actuation nasal spray, [FLUTICASONE PROPIONATE (FLONASE) 50 MCG/ACTUATION NASAL SPRAY] 2 sprays into each nostril., Disp: , Rfl:     hydrOXYzine HCL (ATARAX) 25 MG tablet, Take 1 tablet by mouth daily at bedtime. May take one tablet as needed for anxiety, Disp: , Rfl:     oxyCODONE (ROXICODONE) 5 MG tablet, Take 1 tablet (5 mg) by mouth every 6 hours as needed for pain or severe pain, Disp: 12 tablet, Rfl: 0    Pediatric Multivitamins-Iron  "(MULTIVITAMINS PLUS IRON CHILD) 18 MG CHEW, Take 1 chew tab by mouth 2 times daily Ok to substitute with any chewable that contains 18 mg of iron, Vitamin A, Thiamine and Zinc., Disp: 180 tablet, Rfl: 3    venlafaxine (EFFEXOR XR) 150 MG 24 hr capsule, Take by mouth daily Can restart in 6 weeks when able to take medications whole (Patient not taking: Reported on 12/3/2024), Disp: , Rfl:     venlafaxine (EFFEXOR) 50 MG tablet, Take 1 tablet (50 mg) by mouth 3 times daily (with meals), Disp: , Rfl:    Surgical History     Past Surgical History  She has a past surgical history that includes Cholecystectomy; Tonsillectomy; Ovarian Cyst Removal; Breast surgery; Abdominoplasty; cystourethroscopy (N/A, 03/30/2021); Creation, Vaginal Wall Sling (N/A, 09/28/2023); colonoscopy; [place] Tooth Extraction; Creation, Gastric Bypass, Farheen-En-Y, Laparoscopic (N/A, 6/3/2024); and Laparoscopic lysis adhesions (N/A, 6/3/2024).    Objective-Exam     Constitutional:  Ht 1.626 m (5' 4.02\")   Wt 86.7 kg (191 lb 3.2 oz)   BMI 32.80 kg/m    General:  Pleasant and in no acute distress     Psychiatric: alert and oriented X3, mood and affect normal    Counseling     We reviewed the important post op bariatric recommendations:  -eating 3 meals daily  -eating protein first, getting >60gm protein daily  -eating slowly, chewing food well  -avoiding/limiting calorie containing beverages  -drinking water 15-30 minutes before or after meals  -choosing wheat, not white with breads, crackers, pastas, esthela, bagels, tortillas, rice  -limiting restaurant or cafeteria eating to twice a week or less    We discussed the importance of restorative sleep and stress management in maintaining a healthy weight.  We discussed the National Weight Control Registry healthy weight maintenance strategies and ways to optimize metabolism.  We discussed the importance of physical activity including cardiovascular and strength training in maintaining a healthier " weight.    We discussed the importance of life-long vitamin supplementation and life-long  follow-up.    Pinky was reminded that, to avoid marginal ulcers she should avoid tobacco at all, alcohol in excess, caffeine in excess, and NSAIDS (unless indicated for cardioprotection or othewise and opposed by a PPI).    Fawn Young MD, MD, [facility].  12/3/2024  11:11 AM  Total time spent on the date of this encounter doing: chart review, review of test results, patient visit, physical exam, education, counseling, developing plan of care, and documenting = 30 minutes.

## 2024-12-03 NOTE — NURSING NOTE
Current patient location: [place] [address]    Is the patient currently in the state of [place]? YES    Visit mode:VIDEO    If the visit is dropped, the patient can be reconnected by:VIDEO VISIT: Text to cell phone:   Telephone Information:   Mobile [phone]       Will anyone else be joining the visit? NO  (If patient encounters technical issues they should call [phone]    Are changes needed to the allergy or medication list? No    Are refills needed on medications prescribed by this physician? NO    Rooming Documentation:  Questionnaire(s) completed    Reason for visit: RECHNAINA RAMIREZ

## 2024-12-03 NOTE — PATIENT INSTRUCTIONS
[facility] Bariatric Care  Nutritional Guidelines  Gastric Bypass 6 Months Post Op    General Guidelines and Helpful Hints:  Eat 3 meals per day + protein supplement(s). No snacks between meals.  Do not skip meals.  This can cause overeating at the next meal and will prevent adequate protein and nutritional intake.  Aim for 60-80 grams of protein per day.   Always eat your protein first. This assists with optimal nutrition and helps you stay full longer.  To achieve daily protein goals, it is recommended to drink approved protein supplement between meals.  Follow appropriate portion size: Use measuring cups to be accurate.    Months Post Op Portion Size per Meal   6 months 1/2 cup   7-8 months 1/2 - 2/3 cup   8-9 months 2/3 - 3/4 cup   10-12 months 3/4 - 1 cup   12 months and beyond 1 cup maximum     Continue to use saucer/salad plates, infant/toddler silverware to keep portion sizes small and take small bites.  Eat S-L-O-W-L-Y to make each meal last 20-30 minutes. Always stop eating when satisfied.  Continue to use caution with foods containing skins, peels or membranes. Chew well!  Aim for 64 oz. of calorie-free fluids daily.  Continue to avoid caffeine, carbonation and alcohol.  Remember to avoid drinking during meals, 15-30 minutes before and 30 minutes after.  Aim for 30-60 minutes of physical activity most days of the week.  If having trouble tolerating meat, try using a crock-pot, tinfoil tent, steamer or other moist cooking method to create tender meats. Add broth or low-fat gravy to help meat stay moist.   Avoid high sugar and high fat foods to prevent dumping syndrome.  Check nutrition labels for less than 10 grams of sugar and less than 10 grams of fat per serving.  Continue Taking Vitamins/Minerals:  9430-6213 mcg of Sublingual B-12 daily  1 Multivitamin with Iron twice daily (chewable or swallow tabs)  500-600 mg Calcium Citrate twice daily (chewable or swallow tabs)  5000 IU Vitamin D3 daily    Sample  Grocery List    Protein:  Fat free Greek or light yogurt (less than 10 grams sugar)  Fat free or low-fat cottage cheese  String cheese or reduced fat cheese slices  Tuna, salmon, crab, egg, or chicken salad made with light or fat free mayonnaise  Egg or Egg Substitute  Lean/extra lean turkey, beef, bison, venison (ground, sirloin, round, flank)  Pork loin or tenderloin (grilled, baked, broiled)  Fish such as salmon, tuna, trout, tilapia, etc. (grilled, baked, broiled)  Tender cuts of lean (skinless) turkey or chicken  Lean deli meats: turkey, lean ham, chicken, lean roast beef  Beans such as kidney, garbanzo, black, murray, or low-fat/fat free refried beans  Peanut butter (natural preferred). Limit to 1 Tbsp. per day.  Low-fat meatloaf (made with lean ground beef or turkey)  Sloppy Joes made with low-sugar ketchup and lean ground beef or turkey  Soy or vegetable protein (i.e. vegan crumbles, soy/veggie burger, tofu)  Hummus    Vegetables:  Fresh: cooked or raw (as tolerated)  Frozen vegetables  Canned vegetables (low sodium or no salt added, rinse before cooking/eating)  (Ok to have skins/peels/membranes/seeds - just chew well)    Fruits:  Fresh fruit  Frozen fruit (no sugar added)  Canned fruit (packed in its own juice, NOT syrup)  (Ok to have skins/peels/membranes/seeds - just chew well)    Starch:  Unsweetened whole-grain hot cereal (or high fiber cold cereal, dry)  Toasted whole wheat bread or [place] Thins  Whole grain crackers  Baked/boiled/mashed potato/sweet potato  Cooked whole grain pasta, brown rice, or other cooked whole grains  Starchy vegetables: corn, peas, winter squash      Protein Supplement:   Ready to drink protein shake with:  15-30 grams protein per serving  Less than 10 grams total carbohydrate per serving   Protein powder mixed with:   Skim or 1% milk  Low fat or fat free Lactaid milk, plain or no sugar added soymilk  Water     Fats: (use in moderation)  1 teaspoon of soft tub margarine  1  teaspoon olive oil, canola oil, or peanut oil  1 tablespoon of low-fat keith or salad dressing     Sample Menu for 6 months after Gastric Bypass    You do NOT need to eat/drink the full portion sizes listed below  Always stop when you are satisfied  Breakfast 6 Tbsp. 1% cottage cheese   2 Tbsp. peaches    Lunch 2 oz. lean hamburger or veggie burger  1-2 tsp. salsa   Supplement Approved Protein Shake   (Have between meals throughout the day)   Dinner 6 Tbsp. chicken breast  1-2 Tbsp. green beans     Breakfast 2 Eggs or   cup scrambled egg substitute product   Lunch 7 Tbsp. low-fat Sloppy Filipe mixture   2 high fiber crackers   Supplement Approved Protein Shake   (Have between meals throughout the day)   Dinner 6 Tbsp. grilled, broiled, or baked lemon pepper salmon  2 Tbsp. asparagus     Breakfast 6 Tbsp. light yogurt with 2 Tbsp. Grape Nuts or high fiber cereal    Lunch   cup of chili made with extra lean ground beef and kidney beans   Dinner 5 Tbsp. pork loin made in a crock pot  2 Tbsp. cooked broccoli  1 Tbsp. baked potato with 2 sprays of spray margarine    Supplement Approved Protein Shake   (Have between meals throughout the day)     Breakfast 6 Tbsp. lean ham  2 Tbsp. seedless melon   Lunch 7 Tbsp. diced turkey with 1 teaspoon low-fat gravy  1 Tbsp. green beans   Dinner 6 Tbsp. extra lean ground beef mixed with low sugar spaghetti sauce  2 Tbsp. whole wheat pasta   Supplement Approved Protein Shake   (Have between meals throughout the day)     Breakfast 2 oz turkey or soy based sausage bry    Lunch 6 Tbsp. low-fat cottage cheese  2 Tbsp. pineapple   Supplement Approved Protein Shake   (Have between meals throughout the day)   Dinner 7 Tbsp. beef tenderloin  1 Tbsp. asparagus     Breakfast 1/2 of a whole wheat English Muffin (toasted)  1 Tbsp. creamy natural peanut butter   Lunch   cup of black bean soup with 1 Tbsp. low fat shredded cheese   Dinner 7 Tbsp. low-fat turkey meat loaf   1 Tbsp. cooked carrots    Supplement Approved Protein Shake   (Have between meals throughout the day)     Breakfast 6 Tbsp. scrambled egg or scrambled egg substitute  1 Tbsp. finely chopped bell pepper  1 Tbsp. low fat shredded cheese   Lunch 7 Tbsp. lean diced ham  1 Tbsp. mandarin oranges   Supplement Approved Protein Shake   (Have between meals throughout the day)   Dinner 6 Tbsp. flaked fish   2 Tbsp. mashed sweet potato       COMPREHENSIVE WEIGHT MANAGEMENT PROGRAM  VIRTUAL SUPPORT GROUPS    At [facility], our Comprehensive Weight Management program offers on-line support groups for patients who are working on weight loss and considering, preparing for, or have had weight loss surgery.     There is no cost for this opportunity.  You are invited to attend the?Virtual Support Groups?provided by any of the following locations:    [facility] via Microsoft Teams with Amber Reeves RD, RN  2.   [place] via PeerReach with Zeb Arriaga, PhD, LP  3.   [place] via PeerReach with Sheila Hernández RN  4.   [facility] via a Zoom Meeting with ARTHUR Moreno    The following Support Group information can also be found on our website:  [email or web address]      [facility]   WEIGHT LOSS SURGERY SUPPORT GROUP  The support group is a patient-lead forum that meets monthly to share experiences, encouragement and education. It is open to those who have had weight loss surgery, are scheduled for surgery, or are considering surgery.   WHEN: 3rd Wednesday of each month from 5:00PM - 6:00PM using Microsoft Teams.   FACILITATOR: Led by Amber Pace RD, LD, RN, the program's Clinical Coordinator.   TO REGISTER: Please contact the clinic via [employer] or call the nurse line directly at [phone] to inform our staff that you would like an invite sent to you and to let us know the email you would like the invite sent to. Prior to the  "meeting, a link with directions on how to join the meeting will be sent to you.    2024 Meetings September 18: Rosie Anderson, PhD, Outpatient Clinical Therapist, \"Pro Tips for Habit Change\".  October 16:  Let's Talk  This will be a time of group support.??   November 20:  Let's Talk  about How to Navigate the Upcoming Holiday Season.  December 18:  Let's Talk  and Celebrate the past year.       [facility] BARIATRIC CARE SUPPORT GROUP  This is open to all pre- and post- operative bariatric surgery patients as well as their support system.   WHEN: 3rd Tuesday of each month from 6:30 PM - 8:00 PM using Microsoft Teams.   FACILITATOR: Led by Zeb Arriaga, Ph.D who is a Licensed Psychologist with the [facility] Comprehensive Weight Management Program.   TO REGISTER: Please send an email to Zeb Arriaga, Ph.D., LP at?[email or web address]?if you would like an invitation to the group. Prior to the meeting, a link with directions on how to join the meeting will be sent to you.    2024 Meetings September 17: IN PERSON MEETING. [facility], [facility] [place], [address], 1st floor Conference Room.  October 15: Date changed to OCTOBER 8th (2nd Tuesday).   November 19: \"Holiday Eating\", Juliana Sherwood, ITZEL, LD.  December 17: \"Hospital Stay and Compliance\", Sheila Hernández RN, CBN.      [facility] POST-OPERATIVE BARIATRIC SURGERY SUPPORT GROUP  This is a support group for [facility] bariatric patients (and those external to [facility]) who have had bariatric surgery and are at least 3 months post-surgery.  WHEN: 4th Thursday of the month from 11:00 AM - 12:00 PM using Microsoft Teams.   FACILITATOR: Led by Certified Bariatric Nurse, Sheila Hernández RN, CBN.   TO REGISTER: Please send an email to Sheila at [email or web address] if you would like an invitation to the " group.  Prior to the meeting, a link with directions on how to join the meeting will be sent to you.    2024 Meetings September 26 October 24 November 28: No meeting  December 26    [facility]   HEALTHY LIFESTYLE COACHING GROUP  This is a 60 minute virtual coaching group for those who want to lead a healthier lifestyle. Come together to set goals and overcome barriers in a supportive group environment. We will address the four pillars of health: nutrition, exercise, sleep, and emotional well-being. This group is highly recommended for those who are participating in the 24 week Healthy Lifestyle Plan and our Health Coaching sessions.   WHEN: 1st Friday of the month, 12:30 PM - 1:30 PM   using a Zoom meeting.     FACILITATOR: Led by National Board-Certified Health and , Sheila Del Rio [facility].  TO REGISTER: Please call the [employer] at [phone] to register. You will get an appointment to attend in [place]. Fifteen minutes prior to the meeting, complete the e-check in and you will get the link to join the meeting.  There is no charge to attend this group and space is limited.  Please register for each month you wish to attend    2024 Meetings  September 5 No meeting.  October 4 November 1 December 6

## 2024-12-30 ENCOUNTER — TELEPHONE (OUTPATIENT)
Dept: SURGERY | Facility: CLINIC | Age: 54
End: 2024-12-30
Payer: COMMERCIAL

## 2024-12-30 DIAGNOSIS — K21.9 ACID REFLUX: Primary | ICD-10-CM

## 2024-12-30 DIAGNOSIS — K90.9 INTESTINAL MALABSORPTION, UNSPECIFIED TYPE: ICD-10-CM

## 2024-12-30 NOTE — TELEPHONE ENCOUNTER
Patient called and said that she continues to have issues with nausea and vomiting pretty much every day.  She recently met with Dr. Young and they discussed slowing down, paying attention to portion sizes and the types of food choices that she is making.  She notes that if she burps a lot after her meals then she doesn't usually end up throwing up but if she starts coughing instead she knows she is going to vomit.  She is not having any pain in her stomach or anywhere for that matter and often times isn't able to actually throw anything up except some foam like substance. She admits fault with some things she has been eating, especially around the holidays, but there are times when she can tolerate some foods and then not the next day.  We discussed keeping a food and symptom journal to see if there are some patterns to what she is experiencing and also a way of being mindful of what she is eating and her symptoms.  Different smells can definitely be a trigger for her and she feels like she is much more sensitive to smells as well.  The zofran that Dr. Young gave her does seem to help with her symptoms but she wants to get to the bottom of the reason as well.  She is going to try going back on the omeprazole as well as going back to the basics of the liquid diet and see if she can progress forward again with her eating.  She will let us know if the vomiting continues because I stressed that it is not normal to experience regular vomiting after surgery.  Patient verbalized understanding.    Sheila Hernández RN

## 2025-03-24 DIAGNOSIS — R11.0 NAUSEA: ICD-10-CM

## 2025-03-24 RX ORDER — ONDANSETRON 4 MG/1
4 TABLET, ORALLY DISINTEGRATING ORAL EVERY 8 HOURS PRN
Qty: 30 TABLET | Refills: 1 | Status: SHIPPED | OUTPATIENT
Start: 2025-03-24

## 2025-05-05 ENCOUNTER — MYC MEDICAL ADVICE (OUTPATIENT)
Dept: SURGERY | Facility: CLINIC | Age: 55
End: 2025-05-05
Payer: COMMERCIAL

## 2025-05-05 DIAGNOSIS — E78.5 DYSLIPIDEMIA: ICD-10-CM

## 2025-05-05 DIAGNOSIS — K91.2 POSTOPERATIVE MALABSORPTION: ICD-10-CM

## 2025-05-05 DIAGNOSIS — K90.9 INTESTINAL MALABSORPTION, UNSPECIFIED TYPE: Primary | ICD-10-CM

## 2025-06-02 ENCOUNTER — LAB (OUTPATIENT)
Dept: LAB | Facility: CLINIC | Age: 55
End: 2025-06-02
Payer: COMMERCIAL

## 2025-06-02 DIAGNOSIS — K90.9 INTESTINAL MALABSORPTION, UNSPECIFIED TYPE: ICD-10-CM

## 2025-06-02 DIAGNOSIS — E78.5 DYSLIPIDEMIA: ICD-10-CM

## 2025-06-02 DIAGNOSIS — K91.2 POSTOPERATIVE MALABSORPTION: ICD-10-CM

## 2025-06-02 DIAGNOSIS — Z98.84 S/P BARIATRIC SURGERY: ICD-10-CM

## 2025-06-02 LAB
ALBUMIN SERPL BCG-MCNC: 4.3 G/DL (ref 3.5–5.2)
ALP SERPL-CCNC: 85 U/L (ref 40–150)
ALT SERPL W P-5'-P-CCNC: 31 U/L (ref 0–50)
ANION GAP SERPL CALCULATED.3IONS-SCNC: 11 MMOL/L (ref 7–15)
AST SERPL W P-5'-P-CCNC: 37 U/L (ref 0–45)
BILIRUB SERPL-MCNC: 0.4 MG/DL
BUN SERPL-MCNC: 9.1 MG/DL (ref 6–20)
CALCIUM SERPL-MCNC: 9.5 MG/DL (ref 8.8–10.4)
CHLORIDE SERPL-SCNC: 101 MMOL/L (ref 98–107)
CHOLEST SERPL-MCNC: 245 MG/DL
CREAT SERPL-MCNC: 0.72 MG/DL (ref 0.51–0.95)
EGFRCR SERPLBLD CKD-EPI 2021: >90 ML/MIN/1.73M2
ERYTHROCYTE [DISTWIDTH] IN BLOOD BY AUTOMATED COUNT: 12.5 % (ref 10–15)
EST. AVERAGE GLUCOSE BLD GHB EST-MCNC: 97 MG/DL
FASTING STATUS PATIENT QL REPORTED: ABNORMAL
FASTING STATUS PATIENT QL REPORTED: ABNORMAL
FERRITIN SERPL-MCNC: 82 NG/ML (ref 11–328)
FOLATE SERPL-MCNC: 9.7 NG/ML (ref 4.6–34.8)
GLUCOSE SERPL-MCNC: 108 MG/DL (ref 70–99)
HBA1C MFR BLD: 5 % (ref 0–5.6)
HCO3 SERPL-SCNC: 24 MMOL/L (ref 22–29)
HCT VFR BLD AUTO: 40.7 % (ref 35–47)
HDLC SERPL-MCNC: 132 MG/DL
HGB BLD-MCNC: 13.6 G/DL (ref 11.7–15.7)
LDLC SERPL CALC-MCNC: 79 MG/DL
MCH RBC QN AUTO: 30.8 PG (ref 26.5–33)
MCHC RBC AUTO-ENTMCNC: 33.4 G/DL (ref 31.5–36.5)
MCV RBC AUTO: 92 FL (ref 78–100)
NONHDLC SERPL-MCNC: 113 MG/DL
PLATELET # BLD AUTO: 241 10E3/UL (ref 150–450)
POTASSIUM SERPL-SCNC: 4.6 MMOL/L (ref 3.4–5.3)
PROT SERPL-MCNC: 6.9 G/DL (ref 6.4–8.3)
PTH-INTACT SERPL-MCNC: 52 PG/ML (ref 15–65)
RBC # BLD AUTO: 4.42 10E6/UL (ref 3.8–5.2)
SODIUM SERPL-SCNC: 136 MMOL/L (ref 135–145)
TRIGL SERPL-MCNC: 168 MG/DL
VIT B12 SERPL-MCNC: 845 PG/ML (ref 232–1245)
WBC # BLD AUTO: 6.8 10E3/UL (ref 4–11)

## 2025-06-02 PROCEDURE — 82607 VITAMIN B-12: CPT | Performed by: FAMILY MEDICINE

## 2025-06-02 PROCEDURE — 3044F HG A1C LEVEL LT 7.0%: CPT

## 2025-06-02 PROCEDURE — 99000 SPECIMEN HANDLING OFFICE-LAB: CPT

## 2025-06-02 PROCEDURE — 84630 ASSAY OF ZINC: CPT | Mod: 90

## 2025-06-02 PROCEDURE — 84425 ASSAY OF VITAMIN B-1: CPT | Mod: 90

## 2025-06-02 PROCEDURE — 85027 COMPLETE CBC AUTOMATED: CPT

## 2025-06-02 PROCEDURE — 83718 ASSAY OF LIPOPROTEIN: CPT | Performed by: FAMILY MEDICINE

## 2025-06-02 PROCEDURE — 84590 ASSAY OF VITAMIN A: CPT | Mod: 90

## 2025-06-02 PROCEDURE — 82728 ASSAY OF FERRITIN: CPT | Performed by: FAMILY MEDICINE

## 2025-06-02 PROCEDURE — 36415 COLL VENOUS BLD VENIPUNCTURE: CPT

## 2025-06-02 PROCEDURE — 82374 ASSAY BLOOD CARBON DIOXIDE: CPT | Performed by: FAMILY MEDICINE

## 2025-06-02 PROCEDURE — 82746 ASSAY OF FOLIC ACID SERUM: CPT | Performed by: FAMILY MEDICINE

## 2025-06-02 PROCEDURE — 82306 VITAMIN D 25 HYDROXY: CPT | Performed by: FAMILY MEDICINE

## 2025-06-02 PROCEDURE — 83970 ASSAY OF PARATHORMONE: CPT | Performed by: FAMILY MEDICINE

## 2025-06-02 PROCEDURE — 83036 HEMOGLOBIN GLYCOSYLATED A1C: CPT

## 2025-06-03 ENCOUNTER — VIRTUAL VISIT (OUTPATIENT)
Dept: SURGERY | Facility: CLINIC | Age: 55
End: 2025-06-03
Payer: COMMERCIAL

## 2025-06-03 VITALS — HEIGHT: 64 IN | WEIGHT: 178 LBS | BODY MASS INDEX: 30.39 KG/M2

## 2025-06-03 DIAGNOSIS — E66.9 OBESITY (BMI 30-39.9): ICD-10-CM

## 2025-06-03 DIAGNOSIS — K91.2 POSTOPERATIVE MALABSORPTION: Primary | ICD-10-CM

## 2025-06-03 LAB — ZINC SERPL-MCNC: 69.6 UG/DL

## 2025-06-03 PROCEDURE — 1125F AMNT PAIN NOTED PAIN PRSNT: CPT | Performed by: FAMILY MEDICINE

## 2025-06-03 PROCEDURE — 98007 SYNCH AUDIO-VIDEO EST HI 40: CPT | Performed by: FAMILY MEDICINE

## 2025-06-03 ASSESSMENT — PAIN SCALES - GENERAL: PAINLEVEL_OUTOF10: MODERATE PAIN (4)

## 2025-06-03 NOTE — LETTER
6/3/2025      Pinky Post  [address]  [place] [address]      Dear Colleague,    Thank you for referring your patient, Pinky Post, to the [facility] [place]. Please see a copy of my visit note below.    Bariatric Follow Up Visit with a History of Previous Bariatric Surgery     Date of visit: 6/3/2025  Physician: Fawn Young MD, MD  Primary Care Provider:  Pratibha Pastrana  Pinky Post   55 year old  female    Date of Surgery: 6/3/2024  Initial Weight: 241, 231# at intro  Initial BMI: 40.37  Today's Weight:   Wt Readings from Last 1 Encounters:   06/03/25 80.7 kg (178 lb)     Body mass index is 30.55 kg/m .      Assessment and Plan     Assessment: Pinky is a 55 year old year old female who is 1 year s/p  Farheen en Y Gastric Bypass with Dr. Rizo  Pinky VAZQUEZ Post feels as if she has achieved the goals she hoped to accomplish through bariatric surgery and weight loss.    Encounter Diagnosis   Name Primary?     Postoperative malabsorption Yes         Current Outpatient Medications:      acetaminophen (TYLENOL) 500 MG tablet, Take 2 tablets (1,000 mg) by mouth every 6 hours as needed Do not start taking after surgery until advised by dietician in post-op class., Disp: , Rfl:      buPROPion (WELLBUTRIN XL) 150 MG 24 hr tablet, Take 1 tablet (150 mg) by mouth every morning Can restart 6 weeks after surgery on (Patient not taking: Reported on 12/3/2024), Disp: , Rfl:      buPROPion (WELLBUTRIN) 75 MG tablet, Take 1 tablet (75 mg) by mouth 2 times daily, Disp: , Rfl:      clonazePAM (KLONOPIN) 1 MG tablet, Take 2 mg by mouth nightly as needed for sleep, Disp: , Rfl:      cyanocobalamin (VITAMIN B-12) 1000 MCG sublingual tablet, Place 1 tablet (1,000 mcg) under the tongue daily Start right after surgery., Disp: 90 tablet, Rfl: 3     fluticasone propionate (FLONASE) 50 mcg/actuation nasal spray, [FLUTICASONE PROPIONATE (FLONASE) 50  MCG/ACTUATION NASAL SPRAY] 2 sprays into each nostril., Disp: , Rfl:      hydrOXYzine HCL (ATARAX) 25 MG tablet, Take 1 tablet by mouth daily at bedtime. May take one tablet as needed for anxiety, Disp: , Rfl:      omeprazole (PRILOSEC) 20 MG DR capsule, Take 1 capsule (20 mg) by mouth daily., Disp: 30 capsule, Rfl: PRN     ondansetron (ZOFRAN ODT) 4 MG ODT tab, Take 1 tablet (4 mg) by mouth every 8 hours as needed for nausea., Disp: 30 tablet, Rfl: 1     oxyCODONE (ROXICODONE) 5 MG tablet, Take 1 tablet (5 mg) by mouth every 6 hours as needed for pain or severe pain, Disp: 12 tablet, Rfl: 0     Pediatric Multivitamins-Iron (MULTIVITAMINS PLUS IRON CHILD) 18 MG CHEW, Take 1 chew tab by mouth 2 times daily Ok to substitute with any chewable that contains 18 mg of iron, Vitamin A, Thiamine and Zinc., Disp: 180 tablet, Rfl: 3     venlafaxine (EFFEXOR XR) 150 MG 24 hr capsule, Take by mouth daily Can restart in 6 weeks when able to take medications whole (Patient not taking: Reported on 12/3/2024), Disp: , Rfl:      venlafaxine (EFFEXOR) 50 MG tablet, Take 1 tablet (50 mg) by mouth 3 times daily (with meals), Disp: , Rfl:      Plan:Counting steps is helpful. Get a baseline. DEXA next year with family history of  osteoporosis. Discussed optimizing metabolism.  Reminded about support groups, Dr. Bart RD and health coaches prn. Discussed lab results and multiple other concerns. Reassured vitamin levels that are back are OK. Discussed alcohol metabolism after RYGB.     6 months RD and annual with labs    Bariatric Surgery Review     Interim History/LifeChanges: Frustrated with plateau. 13# from 6 months post op Total 63# from initial weight. B: protein shake with coffee. L: Leftover grilled or lunch meat and string cheese D grilled chicken and tomato with low fat keith and salt  Loves salad but cannot digest them. At one point threw up every day. Feels she was eating too much too fast. Has a smell aversion. Smells still  "give her the dry heaves. Taking vitamins with consistency. Has a smart watch and walks at home on her treadmill 2 miles. Broke her arm 6 weeks after surgery and ankle after surgery as well.     Patient Concerns: weight loss plateau  Appetite (1-10): stable  GERD: no    Medication changes: no    Vitamin Intake:   B-12   SL   MVI  2   Vitamin D  5,000 not every time   Calcium        Other                LABS: \"Reviewed  OK  Nausea no  Vomiting yes  Constipation manageable  Diarrhea manageable  Rashes yes  Hair Loss early on  Calf tenderness no  Breathing difficulty no  Reactive Hypoglycemia aboids  Light Headedness no   Moods stable.  and therapist    12 point ROS as above and otherwise negative      Habits:  Alcohol: 0-2  Tobacco: no  Caffeine coffee  NSAIDS no  Exercise Routine: walking on the treadmill 2 miles 2X/wk  3 meals/day yes  Protein first yes  60 grams/day drinks a protein drink with her coffee  Water Separate from meals no  Calorie Containing Beverages no  Restaurant eating/wk 0-1  Sleeping 8-10  Stress high  CPAP: NA  Contraception: ablation, vasectomy  DEXA:next year    Social History     Social History     Socioeconomic History     Marital status: Single     Spouse name: Not on file     Number of children: Not on file     Years of education: Not on file     Highest education level: Not on file   Occupational History     Not on file   Tobacco Use     Smoking status: Former     Current packs/day: 0.00     Average packs/day: 1 pack/day for 24.0 years (24.0 ttl pk-yrs)     Types: Cigarettes     Start date: 2000     Quit date: 2024     Years since quittin.3     Smokeless tobacco: Never     Tobacco comments:     Age 22. Quit for 7 years at one time. 24yrs total up to 1ppd   Vaping Use     Vaping status: Never Used   Substance and Sexual Activity     Alcohol use: Not Currently     Alcohol/week: 0.0 - 4.0 standard drinks of alcohol     Comment: 0-4     Drug use: Not Currently     Sexual " activity: Not on file   Other Topics Concern     Not on file   Social History Narrative    Lives with her fielvira Adler. 2 grown daughters 31 and 35. Disabled due to fibromyalgia, chronic fatigue. Prior worked for the [employer]      Social Drivers of Health     Financial Resource Strain: Low Risk  (6/12/2023)    Received from [facility]    Financial Resource Strain      Difficulty of Paying Living Expenses: 3      Difficulty of Paying Living Expenses: Not on file   Food Insecurity: No Food Insecurity (6/12/2023)    Received from [facility]    Food Insecurity      Worried About Running Out of Food in the Last Year: 1   Transportation Needs: No Transportation Needs (6/12/2023)    Received from [facility]    Transportation Needs      Lack of Transportation (Medical): 1   Physical Activity: Not on file   Stress: Not on file   Social Connections: Unknown (6/14/2024)    Received from [facility]    Social Connections      Frequency of Communication with Friends and Family: Not on file   Interpersonal Safety: Low Risk  (7/16/2024)    Interpersonal Safety      Do you feel physically and emotionally safe where you currently live?: Yes      Within the past 12 months, have you been hit, slapped, kicked or otherwise physically hurt by someone?: No      Within the past 12 months, have you been humiliated or emotionally abused in other ways by your partner or ex-partner?: No   Housing Stability: Low Risk  (6/12/2023)    Received from [facility]    Housing Stability      Unable to Pay for Housing in the Last Year: 1       Past Medical History     Past Medical History:   Diagnosis Date     Adrenal nodule      Anxiety      Benzodiazepine dependence (H)      Cholecystitis      Depression      Dyslipidemia      Fatigue      Fibromyalgia 01/24/2024      Gastroesophageal reflux disease      Migraine      Morbid obesity (H)      Morbid obesity (H) 01/24/2024     Multiple joint pain      PTSD (post-traumatic stress disorder)      Tobacco use disorder      Problem List     Patient Active Problem List   Diagnosis     Fibromyalgia     Multiple joint pain     Fatigue     Dyslipidemia     Morbid obesity (H)     Morbid (severe) obesity due to excess calories (H)     Medications       Current Outpatient Medications:      acetaminophen (TYLENOL) 500 MG tablet, Take 2 tablets (1,000 mg) by mouth every 6 hours as needed Do not start taking after surgery until advised by dietician in post-op class., Disp: , Rfl:      buPROPion (WELLBUTRIN XL) 150 MG 24 hr tablet, Take 1 tablet (150 mg) by mouth every morning Can restart 6 weeks after surgery on (Patient not taking: Reported on 12/3/2024), Disp: , Rfl:      buPROPion (WELLBUTRIN) 75 MG tablet, Take 1 tablet (75 mg) by mouth 2 times daily, Disp: , Rfl:      clonazePAM (KLONOPIN) 1 MG tablet, Take 2 mg by mouth nightly as needed for sleep, Disp: , Rfl:      cyanocobalamin (VITAMIN B-12) 1000 MCG sublingual tablet, Place 1 tablet (1,000 mcg) under the tongue daily Start right after surgery., Disp: 90 tablet, Rfl: 3     fluticasone propionate (FLONASE) 50 mcg/actuation nasal spray, [FLUTICASONE PROPIONATE (FLONASE) 50 MCG/ACTUATION NASAL SPRAY] 2 sprays into each nostril., Disp: , Rfl:      hydrOXYzine HCL (ATARAX) 25 MG tablet, Take 1 tablet by mouth daily at bedtime. May take one tablet as needed for anxiety, Disp: , Rfl:      omeprazole (PRILOSEC) 20 MG DR capsule, Take 1 capsule (20 mg) by mouth daily., Disp: 30 capsule, Rfl: PRN     ondansetron (ZOFRAN ODT) 4 MG ODT tab, Take 1 tablet (4 mg) by mouth every 8 hours as needed for nausea., Disp: 30 tablet, Rfl: 1     oxyCODONE (ROXICODONE) 5 MG tablet, Take 1 tablet (5 mg) by mouth every 6 hours as needed for pain or severe pain, Disp: 12 tablet, Rfl: 0     Pediatric  "Multivitamins-Iron (MULTIVITAMINS PLUS IRON CHILD) 18 MG CHEW, Take 1 chew tab by mouth 2 times daily Ok to substitute with any chewable that contains 18 mg of iron, Vitamin A, Thiamine and Zinc., Disp: 180 tablet, Rfl: 3     venlafaxine (EFFEXOR XR) 150 MG 24 hr capsule, Take by mouth daily Can restart in 6 weeks when able to take medications whole (Patient not taking: Reported on 12/3/2024), Disp: , Rfl:      venlafaxine (EFFEXOR) 50 MG tablet, Take 1 tablet (50 mg) by mouth 3 times daily (with meals), Disp: , Rfl:    Surgical History     Past Surgical History  She has a past surgical history that includes Cholecystectomy; Tonsillectomy; Ovarian Cyst Removal; Breast surgery; Abdominoplasty; cystourethroscopy (N/A, 03/30/2021); Creation, Vaginal Wall Sling (N/A, 09/28/2023); colonoscopy; [place] Tooth Extraction; Creation, Gastric Bypass, Farheen-En-Y, Laparoscopic (N/A, 6/3/2024); and Laparoscopic lysis adhesions (N/A, 6/3/2024).    Objective-Exam     Constitutional:  Ht 1.626 m (5' 4\")   Wt 80.7 kg (178 lb)   BMI 30.55 kg/m      General:  Pleasant and in no acute distress   Respiratory: Normal respiratory effort, no cough, wheezes or crackles  Musculoskeletal: muscle mass OK  Skin: color pink hair full,   Neurological: No tremor,   Psychiatric: alert and oriented X3, mood and affect normal    Counseling     We reviewed the important post op bariatric recommendations:  -eating 3 meals daily  -eating protein first, getting >60gm protein daily  -eating slowly, chewing food well  -avoiding/limiting calorie containing beverages  -drinking water 15-30 minutes before or after meals  -choosing wheat, not white with breads, crackers, pastas, esthela, bagels, tortillas, rice  -limiting restaurant or cafeteria eating to twice a week or less    We discussed the importance of restorative sleep and stress management in maintaining a healthy weight.  We discussed the National Weight Control Registry healthy weight maintenance " strategies and ways to optimize metabolism.  We discussed the importance of physical activity including cardiovascular and strength training in maintaining a healthier weight.    We discussed the importance of life-long vitamin supplementation and life-long  follow-up.    Pinky was reminded that, to avoid marginal ulcers she should avoid tobacco at all, alcohol in excess, caffeine in excess, and NSAIDS (unless indicated for cardioprotection or othewise and opposed by a PPI).    Fawn Young MD, MD, [facility].  6/3/2025  11:47 AM      No images are attached to the encounter.  Total time spent on the date of this encounter doing: chart review, review of test results, patient visit, physical exam, education, counseling, developing plan of care, and documenting = 40 minutes.    Again, thank you for allowing me to participate in the care of your patient.        Sincerely,        Fawn Young MD    Electronically signed

## 2025-06-03 NOTE — PATIENT INSTRUCTIONS
[facility]  Nutritional Guidelines  Gastric Bypass 12 Months Post Op    General Guidelines and Helpful Hints:  Eat 3 meals per day + protein supplement(s). No snacks between meals.  Do not skip meals.  This can cause overeating at the next meal and will prevent adequate protein and nutritional intake.  Aim for 60-80 grams of protein per day.  Always eat your protein first. This assists with optimal nutrition and helps you stay full longer.  Depending on your portion size, you may need to drink approved protein supplement between meals to achieve protein goals. Follow recommendations of your Dietitian.   Eat your protein first, and then follow with fiber.   It is not necessary to count your fiber, but 15-20 grams per day is recommended.    Add fiber by including fruits, vegetables, whole grains, and beans.   Portions should be about 1 cup per meal. Use measuring cups to be accurate.  Continue to use saucer/salad plates, infant/toddler silverware to keep portion sizes small and take small bites.  Eat S-L-O-W-L-Y to make each meal last 20-30 minutes. Always stop eating when satisfied.  Continue to use caution with foods containing skins, peels or membranes. Chew well!  Aim for 64 oz. of calorie-free fluids daily.  Continue to avoid caffeine and carbonation. If you choose to drink alcohol, do so in moderation.   Remember to avoid drinking during meals, 15-30 minutes before and 30 minutes after.  Exercise is manzano for continued weight loss and weight maintenance. Aim for 30-60 minutes of physical activity most days of the week. Include cardiovascular and strength training.  If having trouble tolerating meat, try using a crock-pot, tinfoil tent, steamer or other moist cooking method to create tender meats. Add broth or low-fat gravy to help meat stay moist.   Avoid high sugar and high fat foods to prevent dumping syndrome.  Check nutrition labels for less than 10 grams of sugar and less than 10 grams of fat  per serving.  Continue Taking Vitamins/Minerals:  6311-1150 mcg of Sublingual B-12 daily  1 Multivitamin with Iron twice daily (chewable or swallow tabs)  500-600 mg Calcium Citrate twice daily (chewable or swallow tabs)  5000 IU Vitamin D3 daily    Sample Grocery List    Protein:  Fat free Greek or light yogurt (less than 10 grams sugar)  Fat free or low-fat cottage cheese  String cheese or reduced fat cheese slices  Tuna, salmon, crab, egg, or chicken salad made with light or fat free mayonnaise  Egg or Egg Substitute  Lean/extra lean turkey, beef, bison, venison (ground, sirloin, round, flank)  Pork loin or tenderloin (grilled, baked, broiled)  Fish such as salmon, tuna, trout, tilapia, etc. (grilled, baked, broiled)  Tender cuts of lean (skinless) turkey or chicken  Lean deli meats: turkey, lean ham, chicken, lean roast beef  Beans such as kidney, garbanzo, black, murray, or low-fat/fat free refried beans  Peanut butter (natural preferred). Limit to 1 Tbsp. per day.  Low-fat meatloaf (made with lean ground beef or turkey)  Sloppy Joes made with low-sugar ketchup and lean ground beef or turkey  Soy or vegetable protein (i.e. vegan crumbles, soy/veggie burger, tofu)  Hummus    Vegetables:  Fresh: cooked or raw (as tolerated)  Frozen vegetables  Canned vegetables (low sodium or no salt added, rinse before cooking/eating)  (Ok to have skins/peels/membranes/seeds - just chew well)    Fruits:  Fresh fruit  Frozen fruit (no sugar added)  Canned fruit (packed in its own juice, NOT syrup)  (Ok to have skins/peels/membranes/seeds - just chew well)    Starch:  Unsweetened whole-grain hot cereal (or high fiber cold cereal, dry)  Toasted whole wheat bread or [place] Thins  Whole grain crackers  Baked/boiled/mashed potato/sweet potato  Cooked whole grain pasta, brown rice, or other cooked whole grains  Starchy vegetables: corn, peas, winter squash      Protein Supplement:   Ready to drink protein shake with:  15-30 grams  protein per serving  Less than 10 grams total carbohydrate per serving   Protein powder mixed with:   Skim or 1% milk  Low fat or fat free Lactaid milk, plain or no sugar added soymilk  Water     Fats: (use in moderation)  1 teaspoon of soft tub margarine  1 teaspoon olive oil, canola oil, or peanut oil  1 tablespoon of low-fat keith or salad dressing     Sample Menu for 12 months after Gastric Bypass    You do NOT need to eat/drink the full portion sizes listed below  Always stop when you are satisfied    Breakfast   cup 1% cottage cheese     cup diced peaches   Lunch   slice whole grain bread/toast with 1 tsp. light keith  2 oz. sliced lean turkey, ham, or chicken    cup carrots   Supplement Approved protein supplement (as needed between meals)   Dinner   cup 93% lean ground beef mixed with 2 Tbsp. marinara sauce     cup green beans    cup whole grain pasta     Breakfast   cup egg substitute or 2 egg whites, scrambled   1 oz low fat shredded cheese    cup sautéed chopped vegetables mixed in   Lunch 1 cup chili made with lean ground beef or turkey   Supplement 6 oz light Greek yogurt (as needed)   Dinner 3 oz  grilled, broiled, or baked lemon pepper salmon  2 Tbsp. grilled asparagus  2 Tbsp. baked sweet potato     Breakfast   cup whole grain oatmeal made with skim milk and unflavored protein powder added    cup blueberries       Lunch 3 oz  meatloaf made with lean ground turkey    cup steamed broccoli   Dinner 3 oz pork loin made in a crock pot seasoned with a spice rub    cup cooked carrots   Supplement Approved protein supplement (as needed between meals)     Breakfast   cup egg scramble made with egg substitute and turkey sausage    whole grain English muffin with 1 teaspoon low sugar jelly   Lunch 3 oz seasoned, skinless grilled chicken     cup grilled vegetables   Dinner 2 oz lean beef    cup brown rice    cup strawberries   Supplement 1 string cheese (as needed)     Breakfast 6 ounces light Greek yogurt    cup  unsweetened mixed berries   Lunch 3 oz shrimp, with low-sugar cocktail sauce for dipping    cup pea pods   Supplement   cup fat free cottage cheese (as needed)   Dinner 3 oz tender turkey breast (made in crock pot for extra moisture)    of a small whole wheat dinner roll     Breakfast     cup low fat cottage cheese    cup strawberries   Lunch   cup black bean soup  4 whole grain crackers   Supplement 1 cup skim milk with scoop of protein powder added (as needed)   Dinner 3 oz. grilled tilapia with lemon pepper seasoning    cup grilled bell peppers     Breakfast 2 ounces turkey sausage bry    whole wheat English muffin   Supplement Approved protein supplement (as needed between meals)   Lunch 3 oz lean ham, turkey, or chicken     cup tomatoes   Dinner 2 oz. sirloin steak    cup mixed vegetables    cup brown rice

## 2025-06-03 NOTE — PROGRESS NOTES
Bariatric Follow Up Visit with a History of Previous Bariatric Surgery     Date of visit: 6/3/2025  Physician: Fawn Young MD, MD  Primary Care Provider:  Pratibha Pastranafer TAYLOR Post   55 year old  female    Date of Surgery: 6/3/2024  Initial Weight: 241, 231# at intro  Initial BMI: 40.37  Today's Weight:   Wt Readings from Last 1 Encounters:   06/03/25 80.7 kg (178 lb)     Body mass index is 30.55 kg/m .      Assessment and Plan     Assessment: Pinky is a 55 year old year old female who is 1 year s/p  Farheen en Y Gastric Bypass with Dr. Darrius Post feels as if she has achieved the goals she hoped to accomplish through bariatric surgery and weight loss.    Encounter Diagnosis   Name Primary?    Postoperative malabsorption Yes         Current Outpatient Medications:     acetaminophen (TYLENOL) 500 MG tablet, Take 2 tablets (1,000 mg) by mouth every 6 hours as needed Do not start taking after surgery until advised by dietician in post-op class., Disp: , Rfl:     buPROPion (WELLBUTRIN XL) 150 MG 24 hr tablet, Take 1 tablet (150 mg) by mouth every morning Can restart 6 weeks after surgery on (Patient not taking: Reported on 12/3/2024), Disp: , Rfl:     buPROPion (WELLBUTRIN) 75 MG tablet, Take 1 tablet (75 mg) by mouth 2 times daily, Disp: , Rfl:     clonazePAM (KLONOPIN) 1 MG tablet, Take 2 mg by mouth nightly as needed for sleep, Disp: , Rfl:     cyanocobalamin (VITAMIN B-12) 1000 MCG sublingual tablet, Place 1 tablet (1,000 mcg) under the tongue daily Start right after surgery., Disp: 90 tablet, Rfl: 3    fluticasone propionate (FLONASE) 50 mcg/actuation nasal spray, [FLUTICASONE PROPIONATE (FLONASE) 50 MCG/ACTUATION NASAL SPRAY] 2 sprays into each nostril., Disp: , Rfl:     hydrOXYzine HCL (ATARAX) 25 MG tablet, Take 1 tablet by mouth daily at bedtime. May take one tablet as needed for anxiety, Disp: , Rfl:     omeprazole (PRILOSEC) 20 MG DR capsule, Take 1 capsule (20 mg) by  mouth daily., Disp: 30 capsule, Rfl: PRN    ondansetron (ZOFRAN ODT) 4 MG ODT tab, Take 1 tablet (4 mg) by mouth every 8 hours as needed for nausea., Disp: 30 tablet, Rfl: 1    oxyCODONE (ROXICODONE) 5 MG tablet, Take 1 tablet (5 mg) by mouth every 6 hours as needed for pain or severe pain, Disp: 12 tablet, Rfl: 0    Pediatric Multivitamins-Iron (MULTIVITAMINS PLUS IRON CHILD) 18 MG CHEW, Take 1 chew tab by mouth 2 times daily Ok to substitute with any chewable that contains 18 mg of iron, Vitamin A, Thiamine and Zinc., Disp: 180 tablet, Rfl: 3    venlafaxine (EFFEXOR XR) 150 MG 24 hr capsule, Take by mouth daily Can restart in 6 weeks when able to take medications whole (Patient not taking: Reported on 12/3/2024), Disp: , Rfl:     venlafaxine (EFFEXOR) 50 MG tablet, Take 1 tablet (50 mg) by mouth 3 times daily (with meals), Disp: , Rfl:      Plan:Counting steps is helpful. Get a baseline. DEXA next year with family history of  osteoporosis. Discussed optimizing metabolism.  Reminded about support groups, Dr. Bart RD and health coaches prn. Discussed lab results and multiple other concerns. Reassured vitamin levels that are back are OK. Discussed alcohol metabolism after RYGB.     6 months RD and annual with labs    Bariatric Surgery Review     Interim History/LifeChanges: Frustrated with plateau. 13# from 6 months post op Total 63# from initial weight. B: protein shake with coffee. L: Leftover grilled or lunch meat and string cheese D grilled chicken and tomato with low fat keith and salt  Loves salad but cannot digest them. At one point threw up every day. Feels she was eating too much too fast. Has a smell aversion. Smells still give her the dry heaves. Taking vitamins with consistency. Has a smart watch and walks at home on her treadmill 2 miles. Broke her arm 6 weeks after surgery and ankle after surgery as well.     Patient Concerns: weight loss plateau  Appetite (1-10): stable  GERD: no    Medication  "changes: no    Vitamin Intake:   B-12   SL   MVI  2   Vitamin D  5,000 not every time   Calcium        Other                LABS: \"Reviewed  OK  Nausea no  Vomiting yes  Constipation manageable  Diarrhea manageable  Rashes yes  Hair Loss early on  Calf tenderness no  Breathing difficulty no  Reactive Hypoglycemia aboids  Light Headedness no   Moods stable.  and therapist    12 point ROS as above and otherwise negative      Habits:  Alcohol: 0-2  Tobacco: no  Caffeine coffee  NSAIDS no  Exercise Routine: walking on the treadmill 2 miles 2X/wk  3 meals/day yes  Protein first yes  60 grams/day drinks a protein drink with her coffee  Water Separate from meals no  Calorie Containing Beverages no  Restaurant eating/wk 0-1  Sleeping 8-10  Stress high  CPAP: NA  Contraception: ablation, vasectomy  DEXA:next year    Social History     Social History     Socioeconomic History    Marital status: Single     Spouse name: Not on file    Number of children: Not on file    Years of education: Not on file    Highest education level: Not on file   Occupational History    Not on file   Tobacco Use    Smoking status: Former     Current packs/day: 0.00     Average packs/day: 1 pack/day for 24.0 years (24.0 ttl pk-yrs)     Types: Cigarettes     Start date: 2000     Quit date: 2024     Years since quittin.3    Smokeless tobacco: Never    Tobacco comments:     Age 22. Quit for 7 years at one time. 24yrs total up to 1ppd   Vaping Use    Vaping status: Never Used   Substance and Sexual Activity    Alcohol use: Not Currently     Alcohol/week: 0.0 - 4.0 standard drinks of alcohol     Comment: 0-4    Drug use: Not Currently    Sexual activity: Not on file   Other Topics Concern    Not on file   Social History Narrative    Lives with her pro [employer]. 2 grown daughters 31 and 35. Disabled due to fibromyalgia, chronic fatigue. Prior worked for the [employer]      Social Drivers of Health     Financial " Resource Strain: Low Risk  (6/12/2023)    Received from [employer] [facility]    Financial Resource Strain     Difficulty of Paying Living Expenses: 3     Difficulty of Paying Living Expenses: Not on file   Food Insecurity: No Food Insecurity (6/12/2023)    Received from [employer] [facility]    Food Insecurity     Worried About Running Out of Food in the Last Year: 1   Transportation Needs: No Transportation Needs (6/12/2023)    Received from [employer] [facility]    Transportation Needs     Lack of Transportation (Medical): 1   Physical Activity: Not on file   Stress: Not on file   Social Connections: Unknown (6/14/2024)    Received from [facility] [facility]    Social Connections     Frequency of Communication with Friends and Family: Not on file   Interpersonal Safety: Low Risk  (7/16/2024)    Interpersonal Safety     Do you feel physically and emotionally safe where you currently live?: Yes     Within the past 12 months, have you been hit, slapped, kicked or otherwise physically hurt by someone?: No     Within the past 12 months, have you been humiliated or emotionally abused in other ways by your partner or ex-partner?: No   Housing Stability: Low Risk  (6/12/2023)    Received from [facility] [facility]    Housing Stability     Unable to Pay for Housing in the Last Year: 1       Past Medical History     Past Medical History:   Diagnosis Date    Adrenal nodule     Anxiety     Benzodiazepine dependence (H)     Cholecystitis     Depression     Dyslipidemia     Fatigue     Fibromyalgia 01/24/2024    Gastroesophageal reflux disease     Migraine     Morbid obesity (H)     Morbid obesity (H) 01/24/2024    Multiple joint pain     PTSD (post-traumatic stress disorder)     Tobacco use disorder      Problem List     Patient Active Problem List   Diagnosis    Fibromyalgia    Multiple joint pain    Fatigue     Dyslipidemia    Morbid obesity (H)    Morbid (severe) obesity due to excess calories (H)     Medications       Current Outpatient Medications:     acetaminophen (TYLENOL) 500 MG tablet, Take 2 tablets (1,000 mg) by mouth every 6 hours as needed Do not start taking after surgery until advised by dietician in post-op class., Disp: , Rfl:     buPROPion (WELLBUTRIN XL) 150 MG 24 hr tablet, Take 1 tablet (150 mg) by mouth every morning Can restart 6 weeks after surgery on (Patient not taking: Reported on 12/3/2024), Disp: , Rfl:     buPROPion (WELLBUTRIN) 75 MG tablet, Take 1 tablet (75 mg) by mouth 2 times daily, Disp: , Rfl:     clonazePAM (KLONOPIN) 1 MG tablet, Take 2 mg by mouth nightly as needed for sleep, Disp: , Rfl:     cyanocobalamin (VITAMIN B-12) 1000 MCG sublingual tablet, Place 1 tablet (1,000 mcg) under the tongue daily Start right after surgery., Disp: 90 tablet, Rfl: 3    fluticasone propionate (FLONASE) 50 mcg/actuation nasal spray, [FLUTICASONE PROPIONATE (FLONASE) 50 MCG/ACTUATION NASAL SPRAY] 2 sprays into each nostril., Disp: , Rfl:     hydrOXYzine HCL (ATARAX) 25 MG tablet, Take 1 tablet by mouth daily at bedtime. May take one tablet as needed for anxiety, Disp: , Rfl:     omeprazole (PRILOSEC) 20 MG DR capsule, Take 1 capsule (20 mg) by mouth daily., Disp: 30 capsule, Rfl: PRN    ondansetron (ZOFRAN ODT) 4 MG ODT tab, Take 1 tablet (4 mg) by mouth every 8 hours as needed for nausea., Disp: 30 tablet, Rfl: 1    oxyCODONE (ROXICODONE) 5 MG tablet, Take 1 tablet (5 mg) by mouth every 6 hours as needed for pain or severe pain, Disp: 12 tablet, Rfl: 0    Pediatric Multivitamins-Iron (MULTIVITAMINS PLUS IRON CHILD) 18 MG CHEW, Take 1 chew tab by mouth 2 times daily Ok to substitute with any chewable that contains 18 mg of iron, Vitamin A, Thiamine and Zinc., Disp: 180 tablet, Rfl: 3    venlafaxine (EFFEXOR XR) 150 MG 24 hr capsule, Take by mouth daily Can restart in 6 weeks when able to take medications  "whole (Patient not taking: Reported on 12/3/2024), Disp: , Rfl:     venlafaxine (EFFEXOR) 50 MG tablet, Take 1 tablet (50 mg) by mouth 3 times daily (with meals), Disp: , Rfl:    Surgical History     Past Surgical History  She has a past surgical history that includes Cholecystectomy; Tonsillectomy; Ovarian Cyst Removal; Breast surgery; Abdominoplasty; cystourethroscopy (N/A, 03/30/2021); Creation, Vaginal Wall Sling (N/A, 09/28/2023); colonoscopy; [place] Tooth Extraction; Creation, Gastric Bypass, Farheen-En-Y, Laparoscopic (N/A, 6/3/2024); and Laparoscopic lysis adhesions (N/A, 6/3/2024).    Objective-Exam     Constitutional:  Ht 1.626 m (5' 4\")   Wt 80.7 kg (178 lb)   BMI 30.55 kg/m      General:  Pleasant and in no acute distress   Respiratory: Normal respiratory effort, no cough, wheezes or crackles  Musculoskeletal: muscle mass OK  Skin: color pink hair full,   Neurological: No tremor,   Psychiatric: alert and oriented X3, mood and affect normal    Counseling     We reviewed the important post op bariatric recommendations:  -eating 3 meals daily  -eating protein first, getting >60gm protein daily  -eating slowly, chewing food well  -avoiding/limiting calorie containing beverages  -drinking water 15-30 minutes before or after meals  -choosing wheat, not white with breads, crackers, pastas, esthela, bagels, tortillas, rice  -limiting restaurant or cafeteria eating to twice a week or less    We discussed the importance of restorative sleep and stress management in maintaining a healthy weight.  We discussed the National Weight Control Registry healthy weight maintenance strategies and ways to optimize metabolism.  We discussed the importance of physical activity including cardiovascular and strength training in maintaining a healthier weight.    We discussed the importance of life-long vitamin supplementation and life-long  follow-up.    Pinky was reminded that, to avoid marginal ulcers she should avoid tobacco at " all, alcohol in excess, caffeine in excess, and NSAIDS (unless indicated for cardioprotection or othewise and opposed by a PPI).    Fawn Young MD, MD, [facility].  6/3/2025  11:47 AM      No images are attached to the encounter.  Total time spent on the date of this encounter doing: chart review, review of test results, patient visit, physical exam, education, counseling, developing plan of care, and documenting = 40 minutes.

## 2025-06-03 NOTE — NURSING NOTE
Is the patient currently in the state of [place]? NO Pt is in [place]     Visit mode:VIDEO    If the visit is dropped, the patient can be reconnected by: VIDEO VISIT: Send to e-mail at: [email or web address]    Will anyone else be joining the visit? NO  (If patient encounters technical issues they should call [phone]    How would you like to obtain your AVS? MyChart    Are changes needed to the allergy or medication list? No    Are refills needed on medications prescribed by this physician? NO    Reason for visit: TY RAMIREZ

## 2025-06-04 LAB
ANNOTATION COMMENT IMP: NORMAL
DEPRECATED CALCIDIOL+CALCIFEROL SERPL-MC: <56 UG/L (ref 20–75)
RETINYL PALMITATE SERPL-MCNC: <0.02 MG/L
VIT A SERPL-MCNC: 0.65 MG/L
VITAMIN D2 SERPL-MCNC: <5 UG/L
VITAMIN D3 SERPL-MCNC: 51 UG/L

## 2025-06-05 LAB — VIT B1 PYROPHOSHATE BLD-SCNC: 138 NMOL/L

## 2025-06-08 ENCOUNTER — HEALTH MAINTENANCE LETTER (OUTPATIENT)
Age: 55
End: 2025-06-08

## 2025-08-11 DIAGNOSIS — R11.0 NAUSEA: ICD-10-CM

## 2025-08-11 RX ORDER — ONDANSETRON 4 MG/1
4 TABLET, ORALLY DISINTEGRATING ORAL EVERY 8 HOURS PRN
Qty: 30 TABLET | Refills: 1 | Status: SHIPPED | OUTPATIENT
Start: 2025-08-11

## (undated) DEVICE — DRAIN BLAKE 19FR SIL 2231

## (undated) DEVICE — SU STRATAFIX PDS PLUS 3-0 SPIRAL SH 15CM SXPP1B420

## (undated) DEVICE — SU ENDO STITCH POLYSORB 2-0 ES-9 48"  170053

## (undated) DEVICE — ANTIFOG SOLUTION SEE SHARP 150M TROCAR SWABS 30978

## (undated) DEVICE — GLOVE BIOGEL PI ULTRATOUCH G SZ 8.0 42180

## (undated) DEVICE — SUCTION MANIFOLD NEPTUNE 2 SYS 1 PORT 702-025-000

## (undated) DEVICE — DRAIN RESERVOIR 100ML JP 0070740

## (undated) DEVICE — POSITIONER ARMBOARD FOAM 1PAIR LF FP-ARMB1

## (undated) DEVICE — DRSG GAUZE 4X4" TRAY 6939

## (undated) DEVICE — ANTIFOG SOLUTION W/FOAM PAD CF-1002

## (undated) DEVICE — DEVICE ENDO STITCH APPLIER 10MM 173016

## (undated) DEVICE — SOL WATER IRRIG 1000ML BOTTLE 2F7114

## (undated) DEVICE — STPL RELOAD REG TISSUE ECHELON 60 X 3.6MM BLUE GST60B

## (undated) DEVICE — GLOVE BIOGEL PI ULTRATOUCH G SZ 6.5 42165

## (undated) DEVICE — ADHESIVE SEAMGUARD BIO STAPLE LINE EC60A

## (undated) DEVICE — PREP CHLORAPREP 26ML TINTED HI-LITE ORANGE 930815

## (undated) DEVICE — BLADE KNIFE SURG 11 371111

## (undated) DEVICE — SYSTEM CALIBRATION GASTRECTOMY SLEEVE VISIGI 3D 32FR 5232B

## (undated) DEVICE — ESU HARMONIC ACE LAP SHEARS STRYKER ACE+ 7 5MMX45CM HARH45

## (undated) DEVICE — TAPE ADH POROUS 3IN CURITY STD 7046C

## (undated) DEVICE — DRSG TEGADERM 2 3/8X2 3/4" 1624W

## (undated) DEVICE — GOWN IMPERVIOUS BREATHABLE SMART LG 89015

## (undated) DEVICE — CLIP LIGACLIP LG YELLOW LT400

## (undated) DEVICE — SYR 30ML LL W/O NDL 302832

## (undated) DEVICE — TUBING LAP/SUCT IRRIG DAVOL 0026880

## (undated) DEVICE — Device

## (undated) DEVICE — ENDO TROCAR OPTICAL ACCESS KII Z-THRD 05X100MM CTR03

## (undated) DEVICE — STPL POWERED ECHELON LONG 60MM PLEE60A

## (undated) DEVICE — GOWN SURGICAL SMARTGOWN 2XL 89075

## (undated) DEVICE — SYR 50ML CATH TIP W/O NDL 309620

## (undated) DEVICE — DECANTER VIAL 2006S

## (undated) DEVICE — SU MONOCRYL+ 4-0 18IN PS2 UND MCP496G

## (undated) DEVICE — DRAPE SHEET REV FOLD 3/4 9349

## (undated) DEVICE — DRSG TELFA 3X4" 1050

## (undated) DEVICE — ENDO TROCAR FIRST ENTRY KII FIOS Z-THRD 12X100MM CTF73

## (undated) DEVICE — ENDO SHEARS RENEW LAP ENDOCUT SCISSOR TIP 16.5MM 3142

## (undated) DEVICE — ENDO TROCAR SLEEVE KII Z-THREADED 05X100MM CTS02

## (undated) DEVICE — ENDO TROCAR SLEEVE KII Z-THREADED 12X100MM CTS22

## (undated) DEVICE — TUBING SMOKE EVAC PNEUMOCLEAR HIGH FLOW 0620050250

## (undated) DEVICE — SU SILK 2-0 FS-1 18" 685G

## (undated) RX ORDER — PROPOFOL 10 MG/ML
INJECTION, EMULSION INTRAVENOUS
Status: DISPENSED
Start: 2024-06-03

## (undated) RX ORDER — ONDANSETRON 2 MG/ML
INJECTION INTRAMUSCULAR; INTRAVENOUS
Status: DISPENSED
Start: 2024-06-03

## (undated) RX ORDER — CEFAZOLIN SODIUM 1 G/3ML
INJECTION, POWDER, FOR SOLUTION INTRAMUSCULAR; INTRAVENOUS
Status: DISPENSED
Start: 2024-06-03

## (undated) RX ORDER — FENTANYL CITRATE-0.9 % NACL/PF 10 MCG/ML
PLASTIC BAG, INJECTION (ML) INTRAVENOUS
Status: DISPENSED
Start: 2024-06-03

## (undated) RX ORDER — BUPIVACAINE HYDROCHLORIDE 2.5 MG/ML
INJECTION, SOLUTION EPIDURAL; INFILTRATION; INTRACAUDAL
Status: DISPENSED
Start: 2024-06-03

## (undated) RX ORDER — LIDOCAINE HYDROCHLORIDE 10 MG/ML
INJECTION, SOLUTION EPIDURAL; INFILTRATION; INTRACAUDAL; PERINEURAL
Status: DISPENSED
Start: 2024-06-03

## (undated) RX ORDER — HYDROMORPHONE HYDROCHLORIDE 1 MG/ML
INJECTION, SOLUTION INTRAMUSCULAR; INTRAVENOUS; SUBCUTANEOUS
Status: DISPENSED
Start: 2024-07-12

## (undated) RX ORDER — BUPIVACAINE HYDROCHLORIDE AND EPINEPHRINE 2.5; 5 MG/ML; UG/ML
INJECTION, SOLUTION INFILTRATION; PERINEURAL
Status: DISPENSED
Start: 2024-06-03

## (undated) RX ORDER — FENTANYL CITRATE 50 UG/ML
INJECTION, SOLUTION INTRAMUSCULAR; INTRAVENOUS
Status: DISPENSED
Start: 2024-06-03

## (undated) RX ORDER — INDOCYANINE GREEN AND WATER 25 MG
KIT INJECTION
Status: DISPENSED
Start: 2024-06-03

## (undated) RX ORDER — DEXAMETHASONE SODIUM PHOSPHATE 10 MG/ML
INJECTION, SOLUTION INTRAMUSCULAR; INTRAVENOUS
Status: DISPENSED
Start: 2024-06-03